# Patient Record
Sex: FEMALE | Race: WHITE | NOT HISPANIC OR LATINO | Employment: FULL TIME | URBAN - METROPOLITAN AREA
[De-identification: names, ages, dates, MRNs, and addresses within clinical notes are randomized per-mention and may not be internally consistent; named-entity substitution may affect disease eponyms.]

---

## 2017-01-09 ENCOUNTER — TRANSCRIBE ORDERS (OUTPATIENT)
Dept: ADMINISTRATIVE | Facility: HOSPITAL | Age: 32
End: 2017-01-09

## 2017-01-09 DIAGNOSIS — Z34.92 PRENATAL CARE, SECOND TRIMESTER: Primary | ICD-10-CM

## 2017-01-11 ENCOUNTER — APPOINTMENT (OUTPATIENT)
Dept: LAB | Facility: HOSPITAL | Age: 32
End: 2017-01-11
Payer: COMMERCIAL

## 2017-01-11 DIAGNOSIS — Z34.90 ENCOUNTER FOR SUPERVISION OF NORMAL PREGNANCY: ICD-10-CM

## 2017-01-11 PROCEDURE — 84702 CHORIONIC GONADOTROPIN TEST: CPT

## 2017-01-11 PROCEDURE — 82677 ASSAY OF ESTRIOL: CPT

## 2017-01-11 PROCEDURE — 36415 COLL VENOUS BLD VENIPUNCTURE: CPT

## 2017-01-11 PROCEDURE — 86336 INHIBIN A: CPT

## 2017-01-11 PROCEDURE — 82105 ALPHA-FETOPROTEIN SERUM: CPT

## 2017-01-14 LAB
2ND TRIMESTER 4 SCREEN SERPL-IMP: NORMAL
2ND TRIMESTER 4 SCREEN SERPL-IMP: NORMAL
AFP ADJ MOM SERPL: 1.07
AFP SERPL-MCNC: 37.6 NG/ML
AGE AT DELIVERY: 30.1 YEARS
FET TS 18 RISK FROM MAT AGE: NORMAL
FET TS 21 RISK FROM MAT AGE: 690
GA METHOD: NORMAL
GA: 17 WEEKS
HCG ADJ MOM SERPL: 3.1
HCG SERPL-ACNC: NORMAL MIU/ML
IDDM PATIENT QL: NORMAL
INHIBIN A ADJ MOM SERPL: 1.49
INHIBIN A SERPL-MCNC: 248.91 PG/ML
KARYOTYP BLD/T: NORMAL
Lab: NORMAL
MULTIPLE PREGNANCY: NO
NEURAL TUBE DEFECT RISK FETUS: 9862 %
SERVICE CMNT-IMP: NORMAL
TS 18 RISK FETUS: NORMAL
TS 21 RISK FETUS: 2084
U ESTRIOL ADJ MOM SERPL: 0.92
U ESTRIOL SERPL-MCNC: 1 NG/ML

## 2017-01-24 ENCOUNTER — GENERIC CONVERSION - ENCOUNTER (OUTPATIENT)
Dept: OTHER | Facility: OTHER | Age: 32
End: 2017-01-24

## 2017-01-31 ENCOUNTER — GENERIC CONVERSION - ENCOUNTER (OUTPATIENT)
Dept: OTHER | Facility: OTHER | Age: 32
End: 2017-01-31

## 2017-02-02 ENCOUNTER — GENERIC CONVERSION - ENCOUNTER (OUTPATIENT)
Dept: OTHER | Facility: OTHER | Age: 32
End: 2017-02-02

## 2017-02-02 ENCOUNTER — ALLSCRIPTS OFFICE VISIT (OUTPATIENT)
Dept: PERINATAL CARE | Facility: CLINIC | Age: 32
End: 2017-02-02
Payer: COMMERCIAL

## 2017-02-02 PROCEDURE — 76817 TRANSVAGINAL US OBSTETRIC: CPT | Performed by: OBSTETRICS & GYNECOLOGY

## 2017-02-02 PROCEDURE — 76805 OB US >/= 14 WKS SNGL FETUS: CPT | Performed by: OBSTETRICS & GYNECOLOGY

## 2017-02-20 ENCOUNTER — GENERIC CONVERSION - ENCOUNTER (OUTPATIENT)
Dept: OTHER | Facility: OTHER | Age: 32
End: 2017-02-20

## 2017-03-22 ENCOUNTER — GENERIC CONVERSION - ENCOUNTER (OUTPATIENT)
Dept: OTHER | Facility: OTHER | Age: 32
End: 2017-03-22

## 2017-03-22 DIAGNOSIS — Z33.1 PREGNANT STATE, INCIDENTAL: ICD-10-CM

## 2017-03-30 ENCOUNTER — APPOINTMENT (OUTPATIENT)
Dept: LAB | Facility: HOSPITAL | Age: 32
End: 2017-03-30
Payer: COMMERCIAL

## 2017-03-30 ENCOUNTER — TRANSCRIBE ORDERS (OUTPATIENT)
Dept: LAB | Facility: HOSPITAL | Age: 32
End: 2017-03-30

## 2017-03-30 DIAGNOSIS — Z33.1 PREGNANT STATE, INCIDENTAL: ICD-10-CM

## 2017-03-30 LAB
BASOPHILS # BLD AUTO: 0.02 THOUSANDS/ΜL (ref 0–0.1)
BASOPHILS NFR BLD AUTO: 0 % (ref 0–1)
EOSINOPHIL # BLD AUTO: 0.07 THOUSAND/ΜL (ref 0–0.61)
EOSINOPHIL NFR BLD AUTO: 1 % (ref 0–6)
ERYTHROCYTE [DISTWIDTH] IN BLOOD BY AUTOMATED COUNT: 13.3 % (ref 11.6–15.1)
GLUCOSE 1H P 50 G GLC PO SERPL-MCNC: 115 MG/DL
HCT VFR BLD AUTO: 33.4 % (ref 34.8–46.1)
HGB BLD-MCNC: 11 G/DL (ref 11.5–15.4)
LYMPHOCYTES # BLD AUTO: 1.33 THOUSANDS/ΜL (ref 0.6–4.47)
LYMPHOCYTES NFR BLD AUTO: 14 % (ref 14–44)
MCH RBC QN AUTO: 29.8 PG (ref 26.8–34.3)
MCHC RBC AUTO-ENTMCNC: 32.9 G/DL (ref 31.4–37.4)
MCV RBC AUTO: 91 FL (ref 82–98)
MONOCYTES # BLD AUTO: 0.61 THOUSAND/ΜL (ref 0.17–1.22)
MONOCYTES NFR BLD AUTO: 7 % (ref 4–12)
NEUTROPHILS # BLD AUTO: 7.37 THOUSANDS/ΜL (ref 1.85–7.62)
NEUTS SEG NFR BLD AUTO: 78 % (ref 43–75)
NRBC BLD AUTO-RTO: 0 /100 WBCS
PLATELET # BLD AUTO: 275 THOUSANDS/UL (ref 149–390)
PMV BLD AUTO: 10.1 FL (ref 8.9–12.7)
RBC # BLD AUTO: 3.69 MILLION/UL (ref 3.81–5.12)
WBC # BLD AUTO: 9.43 THOUSAND/UL (ref 4.31–10.16)

## 2017-03-30 PROCEDURE — 85025 COMPLETE CBC W/AUTO DIFF WBC: CPT

## 2017-03-30 PROCEDURE — 36415 COLL VENOUS BLD VENIPUNCTURE: CPT

## 2017-03-30 PROCEDURE — 82950 GLUCOSE TEST: CPT

## 2017-03-30 PROCEDURE — 86592 SYPHILIS TEST NON-TREP QUAL: CPT

## 2017-03-31 LAB — RPR SER QL: NORMAL

## 2017-04-05 ENCOUNTER — ALLSCRIPTS OFFICE VISIT (OUTPATIENT)
Dept: OTHER | Facility: OTHER | Age: 32
End: 2017-04-05

## 2017-04-19 ENCOUNTER — GENERIC CONVERSION - ENCOUNTER (OUTPATIENT)
Dept: OTHER | Facility: OTHER | Age: 32
End: 2017-04-19

## 2017-04-27 ENCOUNTER — ALLSCRIPTS OFFICE VISIT (OUTPATIENT)
Dept: PERINATAL CARE | Facility: CLINIC | Age: 32
End: 2017-04-27
Payer: COMMERCIAL

## 2017-04-27 ENCOUNTER — GENERIC CONVERSION - ENCOUNTER (OUTPATIENT)
Dept: OTHER | Facility: OTHER | Age: 32
End: 2017-04-27

## 2017-04-27 PROCEDURE — 76816 OB US FOLLOW-UP PER FETUS: CPT | Performed by: OBSTETRICS & GYNECOLOGY

## 2017-05-03 ENCOUNTER — GENERIC CONVERSION - ENCOUNTER (OUTPATIENT)
Dept: OTHER | Facility: OTHER | Age: 32
End: 2017-05-03

## 2017-05-15 ENCOUNTER — GENERIC CONVERSION - ENCOUNTER (OUTPATIENT)
Dept: OTHER | Facility: OTHER | Age: 32
End: 2017-05-15

## 2017-05-30 ENCOUNTER — GENERIC CONVERSION - ENCOUNTER (OUTPATIENT)
Dept: OTHER | Facility: OTHER | Age: 32
End: 2017-05-30

## 2017-05-30 ENCOUNTER — LAB REQUISITION (OUTPATIENT)
Dept: LAB | Facility: HOSPITAL | Age: 32
End: 2017-05-30
Payer: COMMERCIAL

## 2017-05-30 DIAGNOSIS — Z33.1 PREGNANT STATE, INCIDENTAL: ICD-10-CM

## 2017-05-30 PROCEDURE — 87653 STREP B DNA AMP PROBE: CPT | Performed by: PHYSICIAN ASSISTANT

## 2017-06-01 LAB — GP B STREP DNA SPEC QL NAA+PROBE: ABNORMAL

## 2017-06-05 ENCOUNTER — GENERIC CONVERSION - ENCOUNTER (OUTPATIENT)
Dept: OTHER | Facility: OTHER | Age: 32
End: 2017-06-05

## 2017-06-08 ENCOUNTER — ALLSCRIPTS OFFICE VISIT (OUTPATIENT)
Dept: PERINATAL CARE | Facility: CLINIC | Age: 32
End: 2017-06-08
Payer: COMMERCIAL

## 2017-06-08 ENCOUNTER — GENERIC CONVERSION - ENCOUNTER (OUTPATIENT)
Dept: OTHER | Facility: OTHER | Age: 32
End: 2017-06-08

## 2017-06-08 PROCEDURE — 76816 OB US FOLLOW-UP PER FETUS: CPT | Performed by: OBSTETRICS & GYNECOLOGY

## 2017-06-13 ENCOUNTER — GENERIC CONVERSION - ENCOUNTER (OUTPATIENT)
Dept: OTHER | Facility: OTHER | Age: 32
End: 2017-06-13

## 2017-06-16 ENCOUNTER — ANESTHESIA EVENT (INPATIENT)
Dept: LABOR AND DELIVERY | Facility: HOSPITAL | Age: 32
End: 2017-06-16
Payer: COMMERCIAL

## 2017-06-16 ENCOUNTER — ANESTHESIA (INPATIENT)
Dept: LABOR AND DELIVERY | Facility: HOSPITAL | Age: 32
End: 2017-06-16
Payer: COMMERCIAL

## 2017-06-16 ENCOUNTER — HOSPITAL ENCOUNTER (INPATIENT)
Facility: HOSPITAL | Age: 32
LOS: 1 days | Discharge: HOME | End: 2017-06-17
Attending: OBSTETRICS & GYNECOLOGY | Admitting: OBSTETRICS & GYNECOLOGY
Payer: COMMERCIAL

## 2017-06-16 DIAGNOSIS — B95.1 POSITIVE GBS TEST: ICD-10-CM

## 2017-06-16 DIAGNOSIS — O35.8XX0: ICD-10-CM

## 2017-06-16 DIAGNOSIS — O36.60X0 LARGE FOR GESTATIONAL AGE FETUS AFFECTING MANAGEMENT OF MOTHER: ICD-10-CM

## 2017-06-16 DIAGNOSIS — Z3A.38 38 WEEKS GESTATION OF PREGNANCY: Primary | ICD-10-CM

## 2017-06-16 DIAGNOSIS — Z98.891 PREVIOUS CESAREAN SECTION: ICD-10-CM

## 2017-06-16 PROBLEM — O35.HXX0 CLUB FOOT, FETAL, AFFECTING CARE OF MOTHER, ANTEPARTUM: Status: ACTIVE | Noted: 2017-06-16

## 2017-06-16 PROBLEM — Z3A.39 39 WEEKS GESTATION OF PREGNANCY: Status: ACTIVE | Noted: 2017-06-16

## 2017-06-16 LAB
ABO GROUP BLD: NORMAL
BASE EXCESS BLDCOA CALC-SCNC: -3.4 MMOL/L (ref 3–11)
BASE EXCESS BLDCOV CALC-SCNC: -3.1 MMOL/L (ref 1–9)
BASOPHILS # BLD AUTO: 0.02 THOUSANDS/ΜL (ref 0–0.1)
BASOPHILS NFR BLD AUTO: 0 % (ref 0–1)
BLD GP AB SCN SERPL QL: NEGATIVE
EOSINOPHIL # BLD AUTO: 0.04 THOUSAND/ΜL (ref 0–0.61)
EOSINOPHIL NFR BLD AUTO: 0 % (ref 0–6)
ERYTHROCYTE [DISTWIDTH] IN BLOOD BY AUTOMATED COUNT: 14.3 % (ref 11.6–15.1)
HCO3 BLDCOA-SCNC: 24.2 MMOL/L (ref 17.3–27.3)
HCO3 BLDCOV-SCNC: 22.4 MMOL/L (ref 12.2–28.6)
HCT VFR BLD AUTO: 35.8 % (ref 34.8–46.1)
HGB BLD-MCNC: 12 G/DL (ref 11.5–15.4)
LYMPHOCYTES # BLD AUTO: 2.04 THOUSANDS/ΜL (ref 0.6–4.47)
LYMPHOCYTES NFR BLD AUTO: 19 % (ref 14–44)
MCH RBC QN AUTO: 29.2 PG (ref 26.8–34.3)
MCHC RBC AUTO-ENTMCNC: 33.5 G/DL (ref 31.4–37.4)
MCV RBC AUTO: 87 FL (ref 82–98)
MONOCYTES # BLD AUTO: 0.63 THOUSAND/ΜL (ref 0.17–1.22)
MONOCYTES NFR BLD AUTO: 6 % (ref 4–12)
NEUTROPHILS # BLD AUTO: 8.11 THOUSANDS/ΜL (ref 1.85–7.62)
NEUTS SEG NFR BLD AUTO: 75 % (ref 43–75)
NRBC BLD AUTO-RTO: 0 /100 WBCS
O2 CT VFR BLDCOA CALC: 2.6 ML/DL
OXYHGB MFR BLDCOA: 13.6 %
OXYHGB MFR BLDCOV: 51.7 %
PCO2 BLDCOA: 54.4 MM[HG] (ref 30–60)
PCO2 BLDCOV: 41.9 MM HG (ref 27–43)
PH BLDCOA: 7.27 [PH] (ref 7.23–7.43)
PH BLDCOV: 7.35 [PH] (ref 7.19–7.49)
PLATELET # BLD AUTO: 229 THOUSANDS/UL (ref 149–390)
PMV BLD AUTO: 11.6 FL (ref 8.9–12.7)
PO2 BLDCOA: 10.9 MM HG (ref 5–25)
PO2 BLDCOV: 21.3 MM HG (ref 15–45)
RBC # BLD AUTO: 4.11 MILLION/UL (ref 3.81–5.12)
RH BLD: POSITIVE
RPR SER QL: NORMAL
SAO2 % BLDCOV: 10.5 ML/DL
SPECIMEN EXPIRATION DATE: NORMAL
WBC # BLD AUTO: 10.87 THOUSAND/UL (ref 4.31–10.16)

## 2017-06-16 PROCEDURE — 82805 BLOOD GASES W/O2 SATURATION: CPT | Performed by: OBSTETRICS & GYNECOLOGY

## 2017-06-16 PROCEDURE — 86901 BLOOD TYPING SEROLOGIC RH(D): CPT | Performed by: OBSTETRICS & GYNECOLOGY

## 2017-06-16 PROCEDURE — 85025 COMPLETE CBC W/AUTO DIFF WBC: CPT | Performed by: OBSTETRICS & GYNECOLOGY

## 2017-06-16 PROCEDURE — 86592 SYPHILIS TEST NON-TREP QUAL: CPT | Performed by: OBSTETRICS & GYNECOLOGY

## 2017-06-16 PROCEDURE — 86850 RBC ANTIBODY SCREEN: CPT | Performed by: OBSTETRICS & GYNECOLOGY

## 2017-06-16 PROCEDURE — 99214 OFFICE O/P EST MOD 30 MIN: CPT

## 2017-06-16 PROCEDURE — 4A1HXCZ MONITORING OF PRODUCTS OF CONCEPTION, CARDIAC RATE, EXTERNAL APPROACH: ICD-10-PCS | Performed by: OBSTETRICS & GYNECOLOGY

## 2017-06-16 PROCEDURE — 94762 N-INVAS EAR/PLS OXIMTRY CONT: CPT

## 2017-06-16 PROCEDURE — 86900 BLOOD TYPING SEROLOGIC ABO: CPT | Performed by: OBSTETRICS & GYNECOLOGY

## 2017-06-16 RX ORDER — DIPHENHYDRAMINE HCL 25 MG
25 TABLET ORAL EVERY 6 HOURS PRN
Status: DISCONTINUED | OUTPATIENT
Start: 2017-06-16 | End: 2017-06-18 | Stop reason: HOSPADM

## 2017-06-16 RX ORDER — KETOROLAC TROMETHAMINE 30 MG/ML
INJECTION, SOLUTION INTRAMUSCULAR; INTRAVENOUS
Status: COMPLETED
Start: 2017-06-16 | End: 2017-06-16

## 2017-06-16 RX ORDER — OXYTOCIN/RINGER'S LACTATE 30/500 ML
62.5 PLASTIC BAG, INJECTION (ML) INTRAVENOUS ONCE
Status: COMPLETED | OUTPATIENT
Start: 2017-06-16 | End: 2017-06-17

## 2017-06-16 RX ORDER — IRON POLYSACCHARIDE COMPLEX 150 MG
150 CAPSULE ORAL 2 TIMES DAILY
COMMUNITY
End: 2019-12-13 | Stop reason: ALTCHOICE

## 2017-06-16 RX ORDER — OXYCODONE HYDROCHLORIDE AND ACETAMINOPHEN 5; 325 MG/1; MG/1
1 TABLET ORAL EVERY 4 HOURS PRN
Status: DISCONTINUED | OUTPATIENT
Start: 2017-06-16 | End: 2017-06-18 | Stop reason: HOSPADM

## 2017-06-16 RX ORDER — OXYCODONE HYDROCHLORIDE AND ACETAMINOPHEN 5; 325 MG/1; MG/1
2 TABLET ORAL EVERY 4 HOURS PRN
Status: DISCONTINUED | OUTPATIENT
Start: 2017-06-16 | End: 2017-06-18 | Stop reason: HOSPADM

## 2017-06-16 RX ORDER — METOCLOPRAMIDE HYDROCHLORIDE 5 MG/ML
5 INJECTION INTRAMUSCULAR; INTRAVENOUS EVERY 6 HOURS PRN
Status: ACTIVE | OUTPATIENT
Start: 2017-06-16 | End: 2017-06-17

## 2017-06-16 RX ORDER — ONDANSETRON 2 MG/ML
4 INJECTION INTRAMUSCULAR; INTRAVENOUS EVERY 8 HOURS PRN
Status: DISCONTINUED | OUTPATIENT
Start: 2017-06-16 | End: 2017-06-18 | Stop reason: HOSPADM

## 2017-06-16 RX ORDER — EPHEDRINE SULFATE 50 MG/ML
INJECTION, SOLUTION INTRAVENOUS AS NEEDED
Status: DISCONTINUED | OUTPATIENT
Start: 2017-06-16 | End: 2017-06-16 | Stop reason: SURG

## 2017-06-16 RX ORDER — NALBUPHINE HCL 10 MG/ML
5 AMPUL (ML) INJECTION
Status: ACTIVE | OUTPATIENT
Start: 2017-06-16 | End: 2017-06-17

## 2017-06-16 RX ORDER — DOCUSATE SODIUM 100 MG/1
100 CAPSULE, LIQUID FILLED ORAL 2 TIMES DAILY
Status: DISCONTINUED | OUTPATIENT
Start: 2017-06-16 | End: 2017-06-18 | Stop reason: HOSPADM

## 2017-06-16 RX ORDER — SODIUM CHLORIDE, SODIUM LACTATE, POTASSIUM CHLORIDE, CALCIUM CHLORIDE 600; 310; 30; 20 MG/100ML; MG/100ML; MG/100ML; MG/100ML
125 INJECTION, SOLUTION INTRAVENOUS CONTINUOUS
Status: DISCONTINUED | OUTPATIENT
Start: 2017-06-16 | End: 2017-06-16

## 2017-06-16 RX ORDER — ONDANSETRON 2 MG/ML
INJECTION INTRAMUSCULAR; INTRAVENOUS AS NEEDED
Status: DISCONTINUED | OUTPATIENT
Start: 2017-06-16 | End: 2017-06-16 | Stop reason: SURG

## 2017-06-16 RX ORDER — ONDANSETRON 2 MG/ML
4 INJECTION INTRAMUSCULAR; INTRAVENOUS EVERY 4 HOURS PRN
Status: ACTIVE | OUTPATIENT
Start: 2017-06-16 | End: 2017-06-17

## 2017-06-16 RX ORDER — ONDANSETRON 2 MG/ML
4 INJECTION INTRAMUSCULAR; INTRAVENOUS ONCE AS NEEDED
Status: DISCONTINUED | OUTPATIENT
Start: 2017-06-16 | End: 2017-06-18 | Stop reason: HOSPADM

## 2017-06-16 RX ORDER — ACETAMINOPHEN 325 MG/1
650 TABLET ORAL EVERY 6 HOURS PRN
Status: DISCONTINUED | OUTPATIENT
Start: 2017-06-16 | End: 2017-06-18 | Stop reason: HOSPADM

## 2017-06-16 RX ORDER — FENTANYL CITRATE/PF 50 MCG/ML
25 SYRINGE (ML) INJECTION
Status: DISCONTINUED | OUTPATIENT
Start: 2017-06-16 | End: 2017-06-18 | Stop reason: HOSPADM

## 2017-06-16 RX ORDER — DIAPER,BRIEF,INFANT-TODD,DISP
1 EACH MISCELLANEOUS DAILY PRN
Status: DISCONTINUED | OUTPATIENT
Start: 2017-06-16 | End: 2017-06-18 | Stop reason: HOSPADM

## 2017-06-16 RX ORDER — BUPIVACAINE HYDROCHLORIDE 7.5 MG/ML
INJECTION, SOLUTION EPIDURAL; RETROBULBAR AS NEEDED
Status: DISCONTINUED | OUTPATIENT
Start: 2017-06-16 | End: 2017-06-16 | Stop reason: SURG

## 2017-06-16 RX ORDER — MORPHINE SULFATE 1 MG/ML
INJECTION, SOLUTION EPIDURAL; INTRATHECAL; INTRAVENOUS AS NEEDED
Status: DISCONTINUED | OUTPATIENT
Start: 2017-06-16 | End: 2017-06-16 | Stop reason: SURG

## 2017-06-16 RX ORDER — METOCLOPRAMIDE HYDROCHLORIDE 5 MG/ML
10 INJECTION INTRAMUSCULAR; INTRAVENOUS ONCE AS NEEDED
Status: DISCONTINUED | OUTPATIENT
Start: 2017-06-16 | End: 2017-06-18 | Stop reason: HOSPADM

## 2017-06-16 RX ORDER — FENTANYL CITRATE 50 UG/ML
INJECTION, SOLUTION INTRAMUSCULAR; INTRAVENOUS AS NEEDED
Status: DISCONTINUED | OUTPATIENT
Start: 2017-06-16 | End: 2017-06-16 | Stop reason: SURG

## 2017-06-16 RX ORDER — MIDAZOLAM HYDROCHLORIDE 1 MG/ML
INJECTION INTRAMUSCULAR; INTRAVENOUS AS NEEDED
Status: DISCONTINUED | OUTPATIENT
Start: 2017-06-16 | End: 2017-06-16 | Stop reason: SURG

## 2017-06-16 RX ORDER — ACETAMINOPHEN 325 MG/1
TABLET ORAL
Status: COMPLETED
Start: 2017-06-16 | End: 2017-06-16

## 2017-06-16 RX ORDER — IBUPROFEN 600 MG/1
600 TABLET ORAL EVERY 6 HOURS PRN
Status: DISCONTINUED | OUTPATIENT
Start: 2017-06-16 | End: 2017-06-18 | Stop reason: HOSPADM

## 2017-06-16 RX ORDER — OXYTOCIN/RINGER'S LACTATE 30/500 ML
PLASTIC BAG, INJECTION (ML) INTRAVENOUS
Status: DISPENSED
Start: 2017-06-16 | End: 2017-06-16

## 2017-06-16 RX ORDER — KETOROLAC TROMETHAMINE 30 MG/ML
30 INJECTION, SOLUTION INTRAMUSCULAR; INTRAVENOUS EVERY 6 HOURS
Status: DISPENSED | OUTPATIENT
Start: 2017-06-16 | End: 2017-06-17

## 2017-06-16 RX ORDER — OXYTOCIN/RINGER'S LACTATE 30/500 ML
PLASTIC BAG, INJECTION (ML) INTRAVENOUS
Status: COMPLETED
Start: 2017-06-16 | End: 2017-06-16

## 2017-06-16 RX ORDER — DIPHENHYDRAMINE HYDROCHLORIDE 50 MG/ML
25 INJECTION INTRAMUSCULAR; INTRAVENOUS EVERY 6 HOURS PRN
Status: ACTIVE | OUTPATIENT
Start: 2017-06-16 | End: 2017-06-17

## 2017-06-16 RX ORDER — ACETAMINOPHEN 325 MG/1
650 TABLET ORAL EVERY 6 HOURS
Status: DISCONTINUED | OUTPATIENT
Start: 2017-06-16 | End: 2017-06-18 | Stop reason: HOSPADM

## 2017-06-16 RX ORDER — OXYTOCIN/RINGER'S LACTATE 30/500 ML
62.5 PLASTIC BAG, INJECTION (ML) INTRAVENOUS CONTINUOUS
Status: DISPENSED | OUTPATIENT
Start: 2017-06-16 | End: 2017-06-17

## 2017-06-16 RX ORDER — SODIUM CHLORIDE, SODIUM LACTATE, POTASSIUM CHLORIDE, CALCIUM CHLORIDE 600; 310; 30; 20 MG/100ML; MG/100ML; MG/100ML; MG/100ML
125 INJECTION, SOLUTION INTRAVENOUS CONTINUOUS
Status: DISCONTINUED | OUTPATIENT
Start: 2017-06-16 | End: 2017-06-18 | Stop reason: HOSPADM

## 2017-06-16 RX ORDER — NALOXONE HYDROCHLORIDE 0.4 MG/ML
0.1 INJECTION, SOLUTION INTRAMUSCULAR; INTRAVENOUS; SUBCUTANEOUS
Status: ACTIVE | OUTPATIENT
Start: 2017-06-16 | End: 2017-06-17

## 2017-06-16 RX ADMIN — CEFAZOLIN SODIUM 2000 MG: 2 SOLUTION INTRAVENOUS at 03:15

## 2017-06-16 RX ADMIN — SODIUM CHLORIDE, SODIUM LACTATE, POTASSIUM CHLORIDE, AND CALCIUM CHLORIDE 125 ML/HR: .6; .31; .03; .02 INJECTION, SOLUTION INTRAVENOUS at 14:37

## 2017-06-16 RX ADMIN — Medication 62.5 MILLI-UNITS/MIN: at 13:46

## 2017-06-16 RX ADMIN — KETOROLAC TROMETHAMINE 30 MG: 30 INJECTION, SOLUTION INTRAMUSCULAR at 18:03

## 2017-06-16 RX ADMIN — ONDANSETRON 4 MG: 2 INJECTION INTRAMUSCULAR; INTRAVENOUS at 04:01

## 2017-06-16 RX ADMIN — MORPHINE SULFATE 0.2 MG: 1 INJECTION, SOLUTION EPIDURAL; INTRATHECAL; INTRAVENOUS at 03:11

## 2017-06-16 RX ADMIN — AZITHROMYCIN FOR INJECTION INJECTION, POWDER, LYOPHILIZED, FOR SOLUTION 500 MG: 500 INJECTION INTRAVENOUS at 04:15

## 2017-06-16 RX ADMIN — KETOROLAC TROMETHAMINE 30 MG: 30 INJECTION, SOLUTION INTRAMUSCULAR at 23:57

## 2017-06-16 RX ADMIN — KETOROLAC TROMETHAMINE 30 MG: 30 INJECTION, SOLUTION INTRAMUSCULAR at 05:59

## 2017-06-16 RX ADMIN — BUPIVACAINE HYDROCHLORIDE 1.6 ML: 7.5 INJECTION, SOLUTION EPIDURAL; RETROBULBAR at 03:11

## 2017-06-16 RX ADMIN — FENTANYL CITRATE 25 MCG: 50 INJECTION, SOLUTION INTRAMUSCULAR; INTRAVENOUS at 04:01

## 2017-06-16 RX ADMIN — MIDAZOLAM HYDROCHLORIDE 2 MG: 1 INJECTION, SOLUTION INTRAMUSCULAR; INTRAVENOUS at 04:01

## 2017-06-16 RX ADMIN — Medication 62.5 MILLI-UNITS/MIN: at 05:40

## 2017-06-16 RX ADMIN — DOCUSATE SODIUM 100 MG: 100 CAPSULE, LIQUID FILLED ORAL at 17:03

## 2017-06-16 RX ADMIN — SODIUM CHLORIDE, SODIUM LACTATE, POTASSIUM CHLORIDE, AND CALCIUM CHLORIDE: .6; .31; .03; .02 INJECTION, SOLUTION INTRAVENOUS at 02:55

## 2017-06-16 RX ADMIN — ACETAMINOPHEN 650 MG: 325 TABLET, FILM COATED ORAL at 05:59

## 2017-06-16 RX ADMIN — EPHEDRINE SULFATE 5 MG: 50 INJECTION, SOLUTION INTRAMUSCULAR; INTRAVENOUS; SUBCUTANEOUS at 03:17

## 2017-06-16 RX ADMIN — OXYTOCIN 250 MILLI-UNITS/MIN: 10 INJECTION, SOLUTION INTRAMUSCULAR; INTRAVENOUS at 03:39

## 2017-06-16 RX ADMIN — SODIUM CHLORIDE, SODIUM LACTATE, POTASSIUM CHLORIDE, AND CALCIUM CHLORIDE 125 ML/HR: .6; .31; .03; .02 INJECTION, SOLUTION INTRAVENOUS at 21:37

## 2017-06-16 RX ADMIN — Medication 62.5 MILLI-UNITS/MIN: at 21:35

## 2017-06-16 RX ADMIN — DOCUSATE SODIUM 100 MG: 100 CAPSULE, LIQUID FILLED ORAL at 08:58

## 2017-06-16 RX ADMIN — SODIUM CHLORIDE, SODIUM LACTATE, POTASSIUM CHLORIDE, AND CALCIUM CHLORIDE 125 ML/HR: .6; .31; .03; .02 INJECTION, SOLUTION INTRAVENOUS at 05:44

## 2017-06-16 RX ADMIN — SODIUM CHLORIDE, SODIUM LACTATE, POTASSIUM CHLORIDE, AND CALCIUM CHLORIDE: .6; .31; .03; .02 INJECTION, SOLUTION INTRAVENOUS at 03:45

## 2017-06-17 VITALS
TEMPERATURE: 98 F | OXYGEN SATURATION: 98 % | DIASTOLIC BLOOD PRESSURE: 67 MMHG | RESPIRATION RATE: 18 BRPM | HEIGHT: 63 IN | WEIGHT: 186 LBS | SYSTOLIC BLOOD PRESSURE: 138 MMHG | BODY MASS INDEX: 32.96 KG/M2 | HEART RATE: 81 BPM

## 2017-06-17 LAB
BASOPHILS # BLD AUTO: 0.02 THOUSANDS/ΜL (ref 0–0.1)
BASOPHILS NFR BLD AUTO: 0 % (ref 0–1)
EOSINOPHIL # BLD AUTO: 0.07 THOUSAND/ΜL (ref 0–0.61)
EOSINOPHIL NFR BLD AUTO: 1 % (ref 0–6)
ERYTHROCYTE [DISTWIDTH] IN BLOOD BY AUTOMATED COUNT: 14.4 % (ref 11.6–15.1)
HCT VFR BLD AUTO: 28.1 % (ref 34.8–46.1)
HGB BLD-MCNC: 9.5 G/DL (ref 11.5–15.4)
LYMPHOCYTES # BLD AUTO: 2.09 THOUSANDS/ΜL (ref 0.6–4.47)
LYMPHOCYTES NFR BLD AUTO: 17 % (ref 14–44)
MCH RBC QN AUTO: 29.4 PG (ref 26.8–34.3)
MCHC RBC AUTO-ENTMCNC: 33.8 G/DL (ref 31.4–37.4)
MCV RBC AUTO: 87 FL (ref 82–98)
MONOCYTES # BLD AUTO: 0.84 THOUSAND/ΜL (ref 0.17–1.22)
MONOCYTES NFR BLD AUTO: 7 % (ref 4–12)
NEUTROPHILS # BLD AUTO: 9.49 THOUSANDS/ΜL (ref 1.85–7.62)
NEUTS SEG NFR BLD AUTO: 75 % (ref 43–75)
NRBC BLD AUTO-RTO: 0 /100 WBCS
PLATELET # BLD AUTO: 199 THOUSANDS/UL (ref 149–390)
PMV BLD AUTO: 11.2 FL (ref 8.9–12.7)
RBC # BLD AUTO: 3.23 MILLION/UL (ref 3.81–5.12)
WBC # BLD AUTO: 12.55 THOUSAND/UL (ref 4.31–10.16)

## 2017-06-17 PROCEDURE — 85025 COMPLETE CBC W/AUTO DIFF WBC: CPT | Performed by: OBSTETRICS & GYNECOLOGY

## 2017-06-17 RX ORDER — OXYCODONE HYDROCHLORIDE AND ACETAMINOPHEN 5; 325 MG/1; MG/1
1 TABLET ORAL EVERY 4 HOURS PRN
Qty: 30 TABLET | Refills: 0 | Status: SHIPPED | OUTPATIENT
Start: 2017-06-17 | End: 2017-06-27

## 2017-06-17 RX ORDER — IBUPROFEN 600 MG/1
600 TABLET ORAL EVERY 6 HOURS PRN
Qty: 30 TABLET | Refills: 0 | Status: SHIPPED | OUTPATIENT
Start: 2017-06-17 | End: 2018-05-24 | Stop reason: ALTCHOICE

## 2017-06-17 RX ADMIN — ACETAMINOPHEN 650 MG: 325 TABLET, FILM COATED ORAL at 06:06

## 2017-06-17 RX ADMIN — ACETAMINOPHEN 650 MG: 325 TABLET, FILM COATED ORAL at 13:08

## 2017-06-17 RX ADMIN — DOCUSATE SODIUM 100 MG: 100 CAPSULE, LIQUID FILLED ORAL at 08:10

## 2017-06-17 RX ADMIN — Medication 1 TABLET: at 08:10

## 2017-06-26 LAB — PLACENTA IN STORAGE: NORMAL

## 2017-08-10 ENCOUNTER — ALLSCRIPTS OFFICE VISIT (OUTPATIENT)
Dept: OTHER | Facility: OTHER | Age: 32
End: 2017-08-10

## 2018-01-11 NOTE — MISCELLANEOUS
Message   Recorded as Task   Date: 05/06/2016 12:49 PM, Created By: Rachid Cano   Task Name: Medical Complaint Callback   Assigned To: Maranda Belle   Regarding Patient: Calin Aly, Status: In Progress   Comment:    Laverne Ramirez - 06 May 2016 12:49 PM     TASK CREATED  Caller: Self; [phone] (Home)  pt has been spotting for 2weeks   mirena inserted 7/2015 pt is concerned please advise pt @ [facility] - 06 May 2016 12:56 PM     TASK IN [address] [place] [address],[address] - 06 May 2016 1:06 PM     TASK EDITED  Pt states she has had mirena since 2015  spotting bright red for the past two weeks daily  using a panty liner  denies pain,discharge, odor, itching  advised to do a pregnancy test, explained that breakthrough spotting is common with IUD  Has an apt the end of this month  Pt will call back with HPT  Isaias Traore - 06 May 2016 2:48 PM     TASK EDITED  This is just an FYI    Pt doesnt have time to do the HPT at this time  wants to know if she can get her yearly moved up to closer date    has apt on 6/10  Thanks   Isaias Traore - 06 May 2016 2:49 PM     TASK REASSIGNED: Previously Assigned To Ministerio Teresa - 07 May 2016 7:49 AM     TASK REPLIED TO: Previously Assigned To Zach Deepika Khan and left a message that irregular bleeding is expected  Recommended urine pregnancy test  No need to move up appt  Active Problems    1  Contraception (V25 9) (Z30 9)   2  Edema (782 3) (R60 9)   3  Encounter for IUD insertion (V25 11) (Z30 430)   4  Lactational amenorrhea (626 0) (N91 2)   5  Need for Tdap vaccination (V06 1) (Z23)   6  Pregnancy (V22 2) (Z33 1)    Current Meds   1  Prenatal Vitamin TABS; TAKE 1 TABLET DAILY; Therapy: (Recorded:07Nov2014) to Recorded    Allergies    1  No Known Drug Allergies    2  No Known Environmental Allergies   3  No Known Food Allergies    Signatures   Electronically signed by :  Willy Sousa, ; May  9 2016  7:55AM EST                       (Author)

## 2018-01-14 VITALS — SYSTOLIC BLOOD PRESSURE: 114 MMHG | DIASTOLIC BLOOD PRESSURE: 72 MMHG

## 2018-01-14 VITALS
HEIGHT: 64 IN | WEIGHT: 188 LBS | DIASTOLIC BLOOD PRESSURE: 73 MMHG | SYSTOLIC BLOOD PRESSURE: 129 MMHG | BODY MASS INDEX: 32.1 KG/M2

## 2018-01-14 VITALS
HEIGHT: 64 IN | DIASTOLIC BLOOD PRESSURE: 59 MMHG | WEIGHT: 182 LBS | SYSTOLIC BLOOD PRESSURE: 112 MMHG | BODY MASS INDEX: 31.07 KG/M2

## 2018-01-15 VITALS
WEIGHT: 162.4 LBS | DIASTOLIC BLOOD PRESSURE: 71 MMHG | HEIGHT: 64 IN | SYSTOLIC BLOOD PRESSURE: 119 MMHG | BODY MASS INDEX: 27.72 KG/M2

## 2018-01-15 NOTE — PROGRESS NOTES
2017         RE: Felisa Reed                               To: [facility]  MR#: [id number]                                    [address]   : [address]   ENC: [id number]                             [address]   (Exam #: [id number])                           Fax: [phone]      The LMP of this 29year old,  G2, P1-0-0-1 patient was SEP 17 2016, giving   her an MARTINEZ of 2017 and a current gestational age of 34 weeks 5 days   by dates  A sonographic examination was performed on 2017 using   real time equipment  The ultrasound examination was performed using   abdominal technique  The patient has a BMI of 31 2  Her blood pressure   today was 112/59  Earliest ultrasound found in her record: 16   11w1d  17 MARTINEZ      Darius Peters has no complaints today  She reports regular fetal movement and   denies problems related to vaginal bleeding,  labor, or   hypertension  Screening for gestational diabetes on  revealed a   normal one-hour post Glucola value of 115 mg/dL  She is interested in   pursuing TOLAC as an option        Cardiac motion was observed at 133 bpm       INDICATIONS      Interval growth assesment   missed anatomy follow up   elevated [facility]      Exam Types      Level I      RESULTS      Fetus # 1 of 1   Vertex presentation   Fetal growth appeared normal   Placenta Location = Anterior   No placenta previa   Placenta Grade = I      MEASUREMENTS (* Included In Average GA)      AC              28 9 cm        32 weeks 6 days* (68%)   BPD              8 5 cm        34 weeks 1 day * (86%)   HC              30 6 cm        33 weeks 5 days* (67%)   Femur            6 2 cm        32 weeks 1 day * (56%)      HC/AC           1 06   FL/AC           0 22   FL/BPD          0 73   EFW (Ac/Fl/Hc)  2066 grams - 4 lbs 9 oz                 (61%)      THE AVERAGE GESTATIONAL AGE is 33 weeks 1 day +/- 18 days  AMNIOTIC FLUID      Q1: 2 7      Q2: 1 0      Q3: 2 5      Q4: 2 7   LILIANE Total = 8 9 cm   Amniotic Fluid: Normal      ANATOMY DETAILS      Visualized Appearing Sonographically Normal:   HEAD: (Calvarium, BPD Level, Cavum, Lateral Ventricles);    FACE/NECK:   (Profile);    TH  CAV : (Diaphragm); HEART: (Four Chamber View,   Proximal Left Outflow, Proximal Right Outflow, Short Axis of Greater   Vessels, Interventricular Septum, Interatrial Septum, Cardiac Axis,   Cardiac Position);    STOMACH, RIGHT KIDNEY, LEFT KIDNEY, BLADDER,   EXTREMS: (Lt Low Leg, Lt Foot); PLACENTA, PCI      Not Visualized:   HEAD: (Cerebellum);    EXTREMS: (Lt Hand, Rt Low Leg)      Abnormal:   EXTREMS: (Rt Foot)      ANATOMY COMMENTS      The prior fetal anatomic survey was limited with respect to evaluation of   the feet, ankles, left hand, profile, and cardiac septal view  Right   talipes may be present  Views of the left foot, profile, left ankle,   and the cardiac septum were seen today as sonographically normal within   the inherent limitations of fetal ultrasound; however, views of the left   hand were still limited by the constraints related to unfavorable fetal   position  IMPRESSION      Moran IUP   33 weeks and 1 day by this ultrasound  (MARTINEZ=JUN 14 2017)   Vertex presentation   Fetal growth appeared normal   Regular fetal heart rate of 133 bpm   Talipes   Anterior placenta   No placenta previa      GENERAL COMMENT      Fetal right talipes may be present, though this is difficult to diagnose   with certainty given the constraints related to unfavorable fetal   position  No fetal structural abnormality is otherwise identified on the   Level I survey today  Fetal interval growth and amniotic fluid volume are   normal       Today's ultrasound findings and suggested follow-up were discussed in   detail with Macey Ortega   We discussed the need for pediatric orthopedic   surgery consultation if talipes is confirmed at birth  We also discuss,   however, that the difficulty in evaluating the distal lower extremities   today increases the likelihood for false positive diagnosis of talipes  She will return to the [address] [place] [address] on June 8 to assess fetal interval   growth and evaluate the right foot  Daily third trimester fetal kick   counting was discussed at the visit today  The face to face time, in addition to time spent discussing ultrasound   results, was approximately 10 minutes, greater than 50% of which was spent   during counseling and coordination of care  HARVINDER Enciso S , R MADAY HANSEN S  MEGAN Charles     Maternal-Fetal Medicine   Electronically signed 04/29/17 11:20

## 2018-01-16 NOTE — PROGRESS NOTES
2017         RE: Diogenes Pathak                               To: [facility]  MR#: [id number]                                    [address]   : [address]   ENC: [id number], [address]   (Exam #: [id number])                           Fax: [phone]      The LMP of this 36year old,  G2, P1-0-0-1 patient was SEP 17 2016, giving   her an MARTINEZ of 2017 and a current gestational age of 24 weeks 5 days   by dates  A sonographic examination was performed on 2017 using real   time equipment  The ultrasound examination was performed using abdominal &   vaginal techniques  The patient has a BMI of 27 8  Her blood pressure   today was 119/71  Earliest ultrasound found in her record: 16   11w1d  17 MARTINEZ      Thank you very much for your kind referral of Diogenes Pathak to the   Counts include 234 beds at the [facility]  in [place] on 2017 for level II ultrasound   evaluation and MFM assessment  Cheryl Lane is a 82-year-old  2 para   26 white female who is currently at 19-5/7 weeks gestation by an   estimated due date of 2017  Her prenatal course so far has been   unremarkable  Cheryl Lane has no complaints  She reports fetal movement and   denies vaginal bleeding  A recent Quad Screen revealed the Down syndrome   risk of one in  and an open neural tube defect risk of one in 9862  Of   note, the MShCG value was elevated at 3 10 MoM  Cheryl Lane has a history of a prior  section at term in    following an apparently uncomplicated prenatal course  She delivered a 9   lbs  14 oz  baby boy, currently healthy  Her past medical history is   unremarkable  Her past surgical history is otherwise significant for an   appendectomy   She takes no medication with the exception of a prenatal   vitamin and calcium supplementation on a daily basis and has no known drug allergy  She denies tobacco, alcohol, or illicit drug use during the   pregnancy  Her dad has hypertension and diabetes  There is no other first   degree family member with a diagnosis of diabetes, hypertension, venous   thromboembolism, or thyroid disease  The family genetic history is   negative with respect to genetic abnormalities, birth defects, or mental   retardation  Cardiac motion was observed at 159 bpm       INDICATIONS      fetal anatomical survey      Exam Types      Transvaginal   LEVEL II      RESULTS      Fetus # 1 of 1   Variable presentation   Fetal growth appeared normal   Placenta Location = Anterior   Placenta Grade = I      MEASUREMENTS (* Included In Average GA)      AC              15 5 cm        20 weeks 3 days* (65%)   BPD              4 7 cm        20 weeks 1 day * (65%)   HC              18 2 cm        20 weeks 4 days* (71%)   Femur            3 4 cm        21 weeks 0 days* (67%)      Nuchal Fold      5 0 mm      Humerus          3 1 cm        20 weeks 3 days  (66%)   Foot             3 0 cm        19 weeks 1 day      Cerebellum       2 1 cm        20 weeks 6 days   Biorbit          3 4 cm        21 weeks 4 days   CisternaMagna    5 2 mm      HC/AC           1 18   FL/AC           0 22   FL/BPD          0 73   EFW (Ac/Fl/Hc)   370 grams - 0 lbs 13 oz      THE AVERAGE GESTATIONAL AGE is 20 weeks 4 days +/- 10 days  AMNIOTIC FLUID         Largest Vertical Pocket = 4 4 cm   Amniotic Fluid: Normal      CERVICAL EVALUATION      The cervix appeared normal (Ultrasound Examination)  SUPINE      Cervical Length: 4 60 cm      OTHER TEST RESULTS           Funneling?: No             Dynamic Changes?: No        Resp  To TFP?: No                      Debris?: No      ANATOMY      Head                                    Normal   Face/Neck                               See Details   Th  Cav  Normal   Heart                                   See Details   Abd  Cav                                Normal   Stomach                                 Normal   Right Kidney                            Normal   Left Kidney                             Normal   Bladder                                 Normal   Abd  Wall                               Normal   Spine                                   Normal   Extrems                                 See Details   Genitalia                               Normal   Placenta                                Normal   Umbl  Cord                              Normal   Uterus                                  Normal   PCI                                     Normal      ANATOMY DETAILS      Visualized Appearing Sonographically Normal:   HEAD: (Calvarium, BPD Level, Cavum, Lateral Ventricles, Choroid Plexus,   Cerebellum, Cisterna Magna);    FACE/NECK: (Neck, Nuchal Fold, Orbits,   Nose/Lips, Palate, Face);    TH  CAV  : (Lungs, Diaphragm); HEART: (Four   Chamber View, Proximal Left Outflow, Proximal Right Outflow, 3VV, 3 Vessel   Trachea, Short Axis of Greater Vessels, Ductal Arch, Aortic Arch, IVC,   SVC, Cardiac Axis, Cardiac Position);    ABD  CAV : (Liver);    STOMACH,   RIGHT KIDNEY, LEFT KIDNEY, BLADDER, ABD  WALL, SPINE: (Cervical Spine,   Thoracic Spine, Lumbar Spine, Sacrum);    EXTREMS: (Lt Humerus, Rt   Humerus, Lt Forearm, Rt Forearm, Rt Hand, Lt Femur, Rt Femur);      GENITALIA (Male), PLACENTA, UMBL  CORD, UTERUS, PCI      Not Visualized:   FACE/NECK: (Profile); HEART: (Interventricular Septum, Interatrial   Septum);    EXTREMS: (Lt Hand, Lt Low Leg, Rt Low Leg, Lt Foot, Rt Foot)      ADNEXA      The left ovary appeared normal and measured 2 9 x 2 2 x 1 2 cm with a   volume of 4 0 cc  The right ovary appeared normal and measured 3 1 x 2 2 x   1 8 cm with a volume of 6 4 cc        IMPRESSION      Moran IUP   20 weeks and 4 days by this ultrasound  (MARTINEZ=JUN 18 2017)   Variable presentation   Fetal growth appeared normal   Regular fetal heart rate of 159 bpm   Anterior placenta      GENERAL COMMENT      No fetal structural abnormality or ultrasound marker for aneuploidy is   identified on the Level II ultrasound study today  Suboptimal imaging of   the cardiac septal view, left hand, facial profile and nasal bone, and   distal lower extremities is afforded by the constraints related to   unfavorable fetal position  Fetal growth and amniotic fluid volume are   normal   The placenta is normal in appearance  The cervix is normal in appearance by transvaginal sonography  The   cervical length is normal   Cervical debris is not present  Cervical   funneling is not present  Neither provocative nor dynamic change is   appreciated  Today's ultrasound findings and suggested follow-up were discussed in   detail with Cece Nunez  We discussed that prenatal ultrasound cannot rule   out all congenital abnormalities  Her Quad Screen results were discussed   in detail  The elevated MShCG level is associated with an increased risk   for adverse pregnancy outcomes related to abnormal placental function,   including IUGR  She will return [facility] the [facility]  on April 27 to   assess interval growth and evaluate anatomic targets not imaged well today  The face to face time, in addition to time spent discussing ultrasound   results, was approximately 10 minutes, greater than 50% of which was spent   during counseling and coordination of care  HARVINDER Rosen M D  Maternal-Fetal Medicine   Electronically signed 02/03/17 19:44           Electronically signed by:Candace NASSAR    Feb 6 2017 12:40PM EST

## 2018-01-16 NOTE — PROGRESS NOTES
Chief Complaint  TdaP vaccine given in R deltoid without any difficulty- cmt  Active Problems    1  Encounter for supervision of other normal pregnancy in second trimester (V22 1)   (Z34 82)   2  High risk pregnancy with elevated hCG (human chorionic gonadotropin) (796 5,V23 89)   (O28 9,O09 899)   3  History of  section (V45 89) (Y88 175)   4  History of macrosomia in infant in prior pregnancy, currently pregnant (V23 49) (O09 299)   5  Need for Tdap vaccination (V06 1) (Z23)   6  Pregnancy (V22 2) (Z33 1)    Current Meds   1  Calcium 600 MG Oral Tablet Recorded   2  Prenate DHA 28-0 6-0 4-300 MG Oral Capsule Recorded    Allergies    1  No Known Drug Allergies    2  No Known Environmental Allergies   3  No Known Food Allergies    Vitals  Signs    Systolic: 282, LUE, Sitting  Diastolic: 62, LUE, Sitting  Weight: 176 lb   BMI Calculated: 30 21  BSA Calculated: 1 85  LMP: [id number]    Plan   Tdap (Adacel); INJECT 0 5  ML Intramuscular; Dose: 0 5; Series: 1; Route: Intramuscular; Site: Left Deltoid; Done: [id number] 01:55PM; Status: Complete - Retrospective Authorization;  Ordered  For: Health Maintenance, Pregnancy; Ordered By:Michel Lao; Effective Date:2017; Administered by: Dena Monroy: 2017 1:55:00 PM; Last Updated By: Dena Monroy; 2017 2:05:32 PM     Future Appointments    Date/Time Provider Specialty Site   2017 07:00 AM Rimma Whatley [facility] Obstetrics/[facility]ne[facility]   2017 05:00 PM [address] [place] [address]   2017 06:20 PM Alicia Dias [facility] Obstetrics/Gynecology [facility]     Signatures   Electronically signed by : Devyn Lucero [facility];  2017  2:19PM EST                       (Author)    Electronically signed by : MEGAN Perrin ; 2017  3:28PM EST                       (Acknowledgement)

## 2018-01-18 NOTE — MISCELLANEOUS
Message   Recorded as Task   Date: 05/06/2016 12:49 PM, Created By: Dwayne Rodriguez   Task Name: Medical Complaint Callback   Assigned To: Nicole Hastings   Regarding Patient: Bee Dangelo, Status: In Progress   Comment:    Laverne Ramirez - 06 May 2016 12:49 PM     TASK CREATED  Caller: Self; [phone] (Home)  pt has been spotting for 2weeks   mirena inserted 7/2015 pt is concerned please advise pt @ [facility] - 06 May 2016 12:56 PM     TASK IN [address] [place] [address],[address] - 06 May 2016 1:06 PM     TASK EDITED  Pt states she has had mirena since 2015  spotting bright red for the past two weeks daily  using a panty liner  denies pain,discharge, odor, itching  advised to do a pregnancy test, explained that breakthrough spotting is common with IUD  Has an apt the end of this month  Pt will call back with HPT  Anna Manzanares - 06 May 2016 2:48 PM     TASK EDITED  This is just an FYI    Pt doesnt have time to do the HPT at this time  wants to know if she can get her yearly moved up to closer date    has apt on 6/10  Thanks        Active Problems    1  Contraception (V25 9) (Z30 9)   2  Edema (782 3) (R60 9)   3  Encounter for IUD insertion (V25 11) (Z30 430)   4  Lactational amenorrhea (626 0) (N91 2)   5  Need for Tdap vaccination (V06 1) (Z23)   6  Pregnancy (V22 2) (Z33 1)    Current Meds   1  Prenatal Vitamin TABS; TAKE 1 TABLET DAILY; Therapy: (Recorded:21Xlj9237) to Recorded    Allergies    1  No Known Drug Allergies    2  No Known Environmental Allergies   3   No Known Food Allergies    Signatures   Electronically signed by : Shavon Schreiber LPN; May  6 0420  2:13RW EST                       (Author)

## 2018-01-18 NOTE — PROGRESS NOTES
2017         RE: Tom Saint                               To: [facility]  MR#: [id number]                                    [address]   : [address]   ENC: [id number] Ureña, [address]   (Exam #: [id number])                           Fax: [phone]      The LMP of this 29year old,  G2, P1-0-0-1 patient was SEP 17 2016, giving   her an MARTINEZ of 2017 and a current gestational age of 42 weeks 5 days   by dates  A sonographic examination was performed on 2017 using real   time equipment  The ultrasound examination was performed using abdominal   technique  The patient has a BMI of 32 3  Her blood pressure today was   129/73  Earliest ultrasound found in her record: 16   11w1d  17 MARTINEZ      Problem list   1  History of a 9 lbs  14 oz  baby delivered by  in her prior   pregnancy  The placenta is not near her prior  scar  2  Macrosomia noted in this pregnancy   3   Prior ultrasound suspicious for a right-sided clubfoot on the fetus      Cardiac motion was observed at 153 bpm       INDICATIONS      Interval growth assesment   missed anatomy follow up   elevated [facility]      Exam Types      Level I      RESULTS      Fetus # 1 of 1   Vertex presentation   Possible macrosomia   Placenta Location = Anterior   No placenta previa   Placenta Grade = II      MEASUREMENTS (* Included In Average GA)      AC              38 6 cm        42 weeks 6 days* (>95%)   BPD              9 6 cm        39 weeks 3 days* (87%)   HC              35 3 cm        40 weeks 4 days* (88%)   Femur            7 2 cm        36 weeks 4 days* (42%)      Cerebellum       5 4 cm        36 weeks 4 days      HC/AC           0 92   FL/AC           0 19   FL/BPD          0 75   EFW (Ac/Fl/Hc)  4231 grams - 9 lbs 5 oz                 (>95%)      THE AVERAGE GESTATIONAL AGE is 39 weeks 6 days +/- 21 days  AMNIOTIC FLUID      Q1: 3 9      Q2: 4 8      Q3: 4 8      Q4: 1 8   LILIANE Total = 15 4 cm   Amniotic Fluid: Normal      ANATOMY DETAILS      Visualized Appearing Sonographically Normal:   HEAD: (Calvarium, BPD Level, Lateral Ventricles, Choroid Plexus,   Cerebellum, Cisterna Magna);    TH  CAV : (Diaphragm); HEART: (Four   Chamber View, Proximal Left Outflow, Proximal Right Outflow, Cardiac Axis,   Cardiac Position);    STOMACH, RIGHT KIDNEY, LEFT KIDNEY, BLADDER, PLACENTA      Abnormal:   EXTREMS: (Rt Low Leg)      ANATOMY COMMENTS      Right-sided talipes is suspected by today scan  This was not seen on her   20 week US so it may be positional       BIOPHYSICAL PROFILE      The Biophysical Profile score was 8/8  Breathin  Movement: 2  Tone: 2  AFV: 2      IMPRESSION      Moran IUP   39 weeks and 6 days by this ultrasound  (MARTINEZ=2017)   Vertex presentation   Growth assessment indicated possible macrosomia   Regular fetal heart rate of 153 bpm   Talipes   Anterior placenta   No placenta previa      GENERAL COMMENT      She reports she has a  plan for 17  RECOMMENDATION      Growth Ultrasound: As indicated      HARVINDER Jones , MEGAN Tan     Maternal-Fetal Medicine   Electronically signed 17 23:02           Electronically signed by:TYRONE Salazar DO  2017 10:09AM EST

## 2018-01-22 VITALS — WEIGHT: 172 LBS | SYSTOLIC BLOOD PRESSURE: 108 MMHG | BODY MASS INDEX: 29.52 KG/M2 | DIASTOLIC BLOOD PRESSURE: 56 MMHG

## 2018-01-22 VITALS
DIASTOLIC BLOOD PRESSURE: 74 MMHG | SYSTOLIC BLOOD PRESSURE: 114 MMHG | HEIGHT: 64 IN | BODY MASS INDEX: 31.58 KG/M2 | WEIGHT: 185 LBS

## 2018-01-22 VITALS
SYSTOLIC BLOOD PRESSURE: 122 MMHG | WEIGHT: 188 LBS | HEIGHT: 64 IN | BODY MASS INDEX: 32.1 KG/M2 | DIASTOLIC BLOOD PRESSURE: 60 MMHG

## 2018-01-22 VITALS — BODY MASS INDEX: 30.9 KG/M2 | DIASTOLIC BLOOD PRESSURE: 52 MMHG | SYSTOLIC BLOOD PRESSURE: 116 MMHG | WEIGHT: 180 LBS

## 2018-01-22 VITALS — DIASTOLIC BLOOD PRESSURE: 62 MMHG | BODY MASS INDEX: 30.21 KG/M2 | WEIGHT: 176 LBS | SYSTOLIC BLOOD PRESSURE: 120 MMHG

## 2018-01-22 VITALS — BODY MASS INDEX: 32.27 KG/M2 | DIASTOLIC BLOOD PRESSURE: 82 MMHG | SYSTOLIC BLOOD PRESSURE: 124 MMHG | WEIGHT: 188 LBS

## 2018-01-22 VITALS
DIASTOLIC BLOOD PRESSURE: 64 MMHG | SYSTOLIC BLOOD PRESSURE: 112 MMHG | BODY MASS INDEX: 28.51 KG/M2 | HEIGHT: 64 IN | WEIGHT: 167 LBS

## 2018-01-22 VITALS
DIASTOLIC BLOOD PRESSURE: 70 MMHG | SYSTOLIC BLOOD PRESSURE: 118 MMHG | BODY MASS INDEX: 31.81 KG/M2 | WEIGHT: 186.31 LBS | HEIGHT: 64 IN

## 2018-01-22 VITALS
DIASTOLIC BLOOD PRESSURE: 80 MMHG | HEIGHT: 64 IN | WEIGHT: 163 LBS | SYSTOLIC BLOOD PRESSURE: 120 MMHG | BODY MASS INDEX: 27.83 KG/M2

## 2018-01-22 VITALS — WEIGHT: 180 LBS | BODY MASS INDEX: 30.9 KG/M2 | DIASTOLIC BLOOD PRESSURE: 74 MMHG | SYSTOLIC BLOOD PRESSURE: 124 MMHG

## 2018-05-24 ENCOUNTER — OFFICE VISIT (OUTPATIENT)
Dept: OBGYN CLINIC | Facility: CLINIC | Age: 33
End: 2018-05-24

## 2018-05-24 ENCOUNTER — TELEPHONE (OUTPATIENT)
Dept: OBGYN CLINIC | Facility: CLINIC | Age: 33
End: 2018-05-24

## 2018-05-24 ENCOUNTER — APPOINTMENT (OUTPATIENT)
Dept: LAB | Facility: CLINIC | Age: 33
End: 2018-05-24
Payer: COMMERCIAL

## 2018-05-24 VITALS — SYSTOLIC BLOOD PRESSURE: 104 MMHG | WEIGHT: 170 LBS | DIASTOLIC BLOOD PRESSURE: 68 MMHG | BODY MASS INDEX: 30.11 KG/M2

## 2018-05-24 DIAGNOSIS — N92.1 MENORRHAGIA WITH IRREGULAR CYCLE: ICD-10-CM

## 2018-05-24 DIAGNOSIS — N92.1 MENORRHAGIA WITH IRREGULAR CYCLE: Primary | ICD-10-CM

## 2018-05-24 PROBLEM — O03.9 MISCARRIAGE: Status: RESOLVED | Noted: 2018-03-01 | Resolved: 2018-05-24

## 2018-05-24 PROBLEM — Z3A.38 38 WEEKS GESTATION OF PREGNANCY: Status: RESOLVED | Noted: 2017-06-16 | Resolved: 2018-05-24

## 2018-05-24 PROBLEM — O35.8XX0 CLUB FOOT, FETAL, AFFECTING CARE OF MOTHER, ANTEPARTUM: Status: RESOLVED | Noted: 2017-06-16 | Resolved: 2018-05-24

## 2018-05-24 PROBLEM — O36.60X0 LARGE FOR GESTATIONAL AGE FETUS AFFECTING MANAGEMENT OF MOTHER: Status: RESOLVED | Noted: 2017-06-16 | Resolved: 2018-05-24

## 2018-05-24 PROBLEM — O35.HXX0 CLUB FOOT, FETAL, AFFECTING CARE OF MOTHER, ANTEPARTUM: Status: RESOLVED | Noted: 2017-06-16 | Resolved: 2018-05-24

## 2018-05-24 PROBLEM — B95.1 POSITIVE GBS TEST: Status: RESOLVED | Noted: 2017-06-16 | Resolved: 2018-05-24

## 2018-05-24 PROBLEM — R87.619 ABNORMAL PAP SMEAR OF CERVIX: Status: RESOLVED | Noted: 2018-05-24 | Resolved: 2018-05-24

## 2018-05-24 PROBLEM — B01.9 VARICELLA: Status: RESOLVED | Noted: 2018-05-24 | Resolved: 2018-05-24

## 2018-05-24 LAB
BASOPHILS # BLD AUTO: 0.2 THOUSANDS/ΜL (ref 0–0.1)
BASOPHILS NFR BLD AUTO: 2 % (ref 0–1)
EOSINOPHIL # BLD AUTO: 0.2 THOUSAND/ΜL (ref 0–0.61)
EOSINOPHIL NFR BLD AUTO: 2 % (ref 0–6)
ERYTHROCYTE [DISTWIDTH] IN BLOOD BY AUTOMATED COUNT: 14.6 % (ref 11.6–15.1)
HCT VFR BLD AUTO: 36.1 % (ref 34.8–46.1)
HGB BLD-MCNC: 11.2 G/DL (ref 11.5–15.4)
LYMPHOCYTES # BLD AUTO: 4.74 THOUSANDS/ΜL (ref 0.6–4.47)
LYMPHOCYTES NFR BLD AUTO: 56 % (ref 14–44)
MCH RBC QN AUTO: 26.6 PG (ref 26.8–34.3)
MCHC RBC AUTO-ENTMCNC: 31 G/DL (ref 31.4–37.4)
MCV RBC AUTO: 86 FL (ref 82–98)
MONOCYTES # BLD AUTO: 0.7 THOUSAND/ΜL (ref 0.17–1.22)
MONOCYTES NFR BLD AUTO: 8 % (ref 4–12)
NEUTROPHILS # BLD AUTO: 2.63 THOUSANDS/ΜL (ref 1.85–7.62)
NEUTS SEG NFR BLD AUTO: 31 % (ref 43–75)
NRBC BLD AUTO-RTO: 0 /100 WBCS
PLATELET # BLD AUTO: 290 THOUSANDS/UL (ref 149–390)
PMV BLD AUTO: 10 FL (ref 8.9–12.7)
RBC # BLD AUTO: 4.21 MILLION/UL (ref 3.81–5.12)
TSH SERPL DL<=0.05 MIU/L-ACNC: 0.81 UIU/ML (ref 0.36–3.74)
WBC # BLD AUTO: 8.48 THOUSAND/UL (ref 4.31–10.16)

## 2018-05-24 PROCEDURE — 36415 COLL VENOUS BLD VENIPUNCTURE: CPT

## 2018-05-24 PROCEDURE — 85025 COMPLETE CBC W/AUTO DIFF WBC: CPT

## 2018-05-24 PROCEDURE — 99214 OFFICE O/P EST MOD 30 MIN: CPT | Performed by: PHYSICIAN ASSISTANT

## 2018-05-24 PROCEDURE — 84443 ASSAY THYROID STIM HORMONE: CPT

## 2018-05-24 NOTE — PROGRESS NOTES
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 "  She went to [facility] and had an appointment at 8 weeks of pregnancy on 3/15 but that morning miscarried  She was seen there a week later and says she was "scanned and everything was gone "  She says she had six weeks of light bleeding following that miscarriage (until around 4/26) followed by a week off, then started bleeding again on 5/13  She had a week of light bleeding and then started with heavy bleeding 4 days ago on 5/20  She says she is saturating a tampon every hour  This is disruptive to her work and says that she is frustrated by this  She has no pain or cramping  She denies fevers or chills  She has taken no medication for this  She and her  are trying for another pregnancy  She admits to some fatigue but denies chest pain, shortness of breath or dizziness  She says she came back to our office today "to give it one last try before giving up" because we were able to accommodate her prior to [facility] being able to accommodate her       Past Medical History:   Diagnosis Date    Abnormal Pap smear of cervix     Miscarriage 03/2018    Varicella      Family History   Problem Relation Age of Onset    Stroke Maternal Grandfather     Cancer Maternal Grandfather     Cancer Paternal Grandmother      Social History     Social History    Marital status: /Civil Union     Spouse name: N/A    Number of children: N/A    Years of education: N/A     Social History Main Topics    Smoking status: Never Smoker    Smokeless tobacco: Not on file    Alcohol use No    Drug use: No    Sexual activity: Yes     Partners: Male     Other Topics Concern    Not on file     Social History Narrative    No narrative on file       O:  /68 (BP Location: Right arm, Patient Position: Sitting, Cuff Size: Standard)   Wt 77 1 kg (170 lb)   LMP 05/13/2018 (Exact Date)   BMI 30 11 kg/m²   She appears well and is in no distress  Abdomen is soft and nontender  External genitals are normal without lesions or rashes  Vagina is normal with moderate bleeding  Cervix is closed, normal, no lesions or discharge  Uterus is nontender, no masses  Adnexa are nontender, no pelvic masses appreciated    Urine pregnancy test is negative     A/P:  Menorrhagia with irregular cycle  Check TSH, CBC  Offered Aygestin to help stop her bleeding, she declines for now, hoping this is a period and will stop shortly  I did send Aygestin to pharmacy in case she changes her mind  Suggested at least Aleve two po BID to help lessen her bleeding  I will follow-up with her in regards to her labs later today or tomorrow when they are back

## 2018-05-24 NOTE — TELEPHONE ENCOUNTER
----- Message from Norma Baez PA-C sent at 5/24/2018  3:35 PM EDT -----  Please let Beti Chapa know that her blood count and thyroid are both normal  I saw her this morning and she was anxious for results   thx

## 2018-05-29 NOTE — TELEPHONE ENCOUNTER
Left voicemail message today @ [phone] per Pt's signed communication consent form in Pt's EHR  Pt informed, via voicemail message, that her blood count and thyroid are both normal per JOEL Lao' review  Reiterated to Pt, via message, that if she has any questions/concerns, to contact office

## 2018-09-25 ENCOUNTER — LAB REQUISITION (OUTPATIENT)
Dept: LAB | Facility: HOSPITAL | Age: 33
End: 2018-09-25

## 2018-09-25 DIAGNOSIS — N39.0 URINARY TRACT INFECTION: ICD-10-CM

## 2018-09-25 PROCEDURE — 87086 URINE CULTURE/COLONY COUNT: CPT | Performed by: NURSE PRACTITIONER

## 2018-09-26 LAB — BACTERIA UR CULT: NORMAL

## 2019-02-27 ENCOUNTER — TELEPHONE (OUTPATIENT)
Dept: OBGYN CLINIC | Facility: CLINIC | Age: 34
End: 2019-02-27

## 2019-02-27 NOTE — TELEPHONE ENCOUNTER
Pt needs a form stating she had the tdap vaccine given to her in 2017 during her pregnancy   Fax to her at work: [phone] (att  to patient)

## 2019-02-27 NOTE — TELEPHONE ENCOUNTER
Called pt back to verify if the immunization summary is ok  She stated that was perfect   Faxed paperwork to pt per her request

## 2019-05-08 ENCOUNTER — OFFICE VISIT (OUTPATIENT)
Dept: OBGYN CLINIC | Facility: CLINIC | Age: 34
End: 2019-05-08
Payer: COMMERCIAL

## 2019-05-08 VITALS — WEIGHT: 180 LBS | BODY MASS INDEX: 31.89 KG/M2 | SYSTOLIC BLOOD PRESSURE: 114 MMHG | DIASTOLIC BLOOD PRESSURE: 72 MMHG

## 2019-05-08 DIAGNOSIS — N91.2 AMENORRHEA: Primary | ICD-10-CM

## 2019-05-08 PROCEDURE — 76801 OB US < 14 WKS SINGLE FETUS: CPT | Performed by: PHYSICIAN ASSISTANT

## 2019-05-08 PROCEDURE — 99213 OFFICE O/P EST LOW 20 MIN: CPT | Performed by: PHYSICIAN ASSISTANT

## 2019-05-14 ENCOUNTER — TELEPHONE (OUTPATIENT)
Dept: OBGYN CLINIC | Facility: CLINIC | Age: 34
End: 2019-05-14

## 2019-05-21 ENCOUNTER — INITIAL PRENATAL (OUTPATIENT)
Dept: OBGYN CLINIC | Facility: CLINIC | Age: 34
End: 2019-05-21

## 2019-05-21 DIAGNOSIS — Z34.81 PRENATAL CARE, SUBSEQUENT PREGNANCY, FIRST TRIMESTER: Primary | ICD-10-CM

## 2019-05-21 PROCEDURE — OBC: Performed by: OBSTETRICS & GYNECOLOGY

## 2019-05-31 ENCOUNTER — TELEPHONE (OUTPATIENT)
Dept: OBGYN CLINIC | Facility: CLINIC | Age: 34
End: 2019-05-31

## 2019-05-31 DIAGNOSIS — Z34.01 ENCOUNTER FOR SUPERVISION OF NORMAL FIRST PREGNANCY IN FIRST TRIMESTER: Primary | ICD-10-CM

## 2019-06-01 ENCOUNTER — ROUTINE PRENATAL (OUTPATIENT)
Dept: PERINATAL CARE | Facility: CLINIC | Age: 34
End: 2019-06-01
Payer: COMMERCIAL

## 2019-06-01 VITALS
BODY MASS INDEX: 30.25 KG/M2 | SYSTOLIC BLOOD PRESSURE: 118 MMHG | HEIGHT: 64 IN | WEIGHT: 177.2 LBS | DIASTOLIC BLOOD PRESSURE: 77 MMHG | HEART RATE: 92 BPM

## 2019-06-01 DIAGNOSIS — Z3A.12 12 WEEKS GESTATION OF PREGNANCY: ICD-10-CM

## 2019-06-01 DIAGNOSIS — Z34.01 ENCOUNTER FOR SUPERVISION OF NORMAL FIRST PREGNANCY IN FIRST TRIMESTER: ICD-10-CM

## 2019-06-01 DIAGNOSIS — Z36.82 ENCOUNTER FOR ANTENATAL SCREENING FOR NUCHAL TRANSLUCENCY: Primary | ICD-10-CM

## 2019-06-01 DIAGNOSIS — Z98.891 HX OF CESAREAN SECTION: ICD-10-CM

## 2019-06-01 PROCEDURE — 76813 OB US NUCHAL MEAS 1 GEST: CPT | Performed by: OBSTETRICS & GYNECOLOGY

## 2019-06-01 PROCEDURE — 99212 OFFICE O/P EST SF 10 MIN: CPT | Performed by: OBSTETRICS & GYNECOLOGY

## 2019-06-04 ENCOUNTER — INITIAL PRENATAL (OUTPATIENT)
Dept: OBGYN CLINIC | Facility: CLINIC | Age: 34
End: 2019-06-04

## 2019-06-04 VITALS — DIASTOLIC BLOOD PRESSURE: 62 MMHG | WEIGHT: 178 LBS | SYSTOLIC BLOOD PRESSURE: 100 MMHG | BODY MASS INDEX: 30.55 KG/M2

## 2019-06-04 DIAGNOSIS — Z98.891 S/P REPEAT LOW TRANSVERSE C-SECTION: ICD-10-CM

## 2019-06-04 DIAGNOSIS — Z34.82 MULTIGRAVIDA IN SECOND TRIMESTER: Primary | ICD-10-CM

## 2019-06-04 PROBLEM — Z87.59 HISTORY OF OBSTETRIC PROBLEM: Status: RESOLVED | Noted: 2019-06-04 | Resolved: 2019-06-04

## 2019-06-04 PROBLEM — Z87.59 HISTORY OF OBSTETRIC PROBLEM: Status: ACTIVE | Noted: 2019-06-04

## 2019-06-04 PROBLEM — N91.2 AMENORRHEA: Status: RESOLVED | Noted: 2019-05-08 | Resolved: 2019-06-04

## 2019-06-04 PROCEDURE — PNV: Performed by: PHYSICIAN ASSISTANT

## 2019-06-04 PROCEDURE — G0145 SCR C/V CYTO,THINLAYER,RESCR: HCPCS | Performed by: PATHOLOGY

## 2019-06-04 PROCEDURE — 87624 HPV HI-RISK TYP POOLED RSLT: CPT | Performed by: PHYSICIAN ASSISTANT

## 2019-06-04 PROCEDURE — 87491 CHLMYD TRACH DNA AMP PROBE: CPT | Performed by: PHYSICIAN ASSISTANT

## 2019-06-04 PROCEDURE — 87591 N.GONORRHOEAE DNA AMP PROB: CPT | Performed by: PHYSICIAN ASSISTANT

## 2019-06-04 PROCEDURE — G0124 SCREEN C/V THIN LAYER BY MD: HCPCS | Performed by: PATHOLOGY

## 2019-06-06 LAB
HPV HR 12 DNA CVX QL NAA+PROBE: NEGATIVE
HPV16 DNA CVX QL NAA+PROBE: NEGATIVE
HPV18 DNA CVX QL NAA+PROBE: NEGATIVE

## 2019-06-07 LAB
APPEARANCE UR: CLEAR
BASOPHILS # BLD AUTO: 76 CELLS/UL (ref 0–200)
BASOPHILS NFR BLD AUTO: 0.8 %
BILIRUB UR QL STRIP: NEGATIVE
C TRACH DNA SPEC QL NAA+PROBE: NEGATIVE
COLOR UR: YELLOW
EOSINOPHIL # BLD AUTO: 67 CELLS/UL (ref 15–500)
EOSINOPHIL NFR BLD AUTO: 0.7 %
ERYTHROCYTE [DISTWIDTH] IN BLOOD BY AUTOMATED COUNT: 18.5 % (ref 11–15)
GLUCOSE UR QL STRIP: NEGATIVE
HBV SURFACE AG SERPL QL IA: NORMAL
HCT VFR BLD AUTO: 33.5 % (ref 35–45)
HGB BLD-MCNC: 10.3 G/DL (ref 11.7–15.5)
HGB UR QL STRIP: NEGATIVE
HIV 1+2 AB+HIV1 P24 AG SERPL QL IA: NORMAL
KETONES UR QL STRIP: NEGATIVE
LEUKOCYTE ESTERASE UR QL STRIP: NEGATIVE
LYMPHOCYTES # BLD AUTO: 2736 CELLS/UL (ref 850–3900)
LYMPHOCYTES NFR BLD AUTO: 28.8 %
MCH RBC QN AUTO: 24.1 PG (ref 27–33)
MCHC RBC AUTO-ENTMCNC: 30.7 G/DL (ref 32–36)
MCV RBC AUTO: 78.5 FL (ref 80–100)
MONOCYTES # BLD AUTO: 599 CELLS/UL (ref 200–950)
MONOCYTES NFR BLD AUTO: 6.3 %
N GONORRHOEA DNA SPEC QL NAA+PROBE: NEGATIVE
NEUTROPHILS # BLD AUTO: 6023 CELLS/UL (ref 1500–7800)
NEUTROPHILS NFR BLD AUTO: 63.4 %
NITRITE UR QL STRIP: NEGATIVE
PH UR STRIP: 6.5 [PH] (ref 5–8)
PLATELET # BLD AUTO: 376 THOUSAND/UL (ref 140–400)
PMV BLD REES-ECKER: 10.4 FL (ref 7.5–12.5)
PROT UR QL STRIP: NEGATIVE
RBC # BLD AUTO: 4.27 MILLION/UL (ref 3.8–5.1)
RPR SER QL: NORMAL
RUBV IGG SERPL IA-ACNC: 3.22 INDEX
SP GR UR STRIP: 1.02 (ref 1–1.03)
WBC # BLD AUTO: 9.5 THOUSAND/UL (ref 3.8–10.8)

## 2019-06-11 LAB
# FETUSES US: 1
AGE: ABNORMAL
B-HCG ADJ MOM SERPL: 3.34
B-HCG SERPL-ACNC: 242 IU/ML
COLLECT DATE: ABNORMAL
CURRENT SMOKER: ABNORMAL
DONATED EGG PATIENT QL: NO
FET CRL US.MEAS: 62 MM
FET CRL US.MEAS: ABNORMAL MM
FET NUCHAL FOLD MOM THICKNESS US.MEAS: 1.27
FET NUCHAL FOLD THICKNESS US.MEAS: 1.8 MM
FET NUCHAL FOLD THICKNESS US.MEAS: ABNORMAL MM
FET TS 21 RISK FROM MAT AGE: ABNORMAL
GA CLIN EST: 13 WK
GA METHOD: ABNORMAL
HX OF NTD NARR: NO
HX OF TRISOMY 21 NARR: NO
IDDM PATIENT QL: NO
INTEGRATED SCN PATIENT-IMP: ABNORMAL
PAPP-A MOM SERPL: 0.31
PAPP-A SERPL-MCNC: 261.8 NG/ML
PHYSICIAN NPI: ABNORMAL
SL AMB NASAL BONE: PRESENT
SL AMB NTQR LOCATION ID#: ABNORMAL
SL AMB NTQR READING PHYS ID#: ABNORMAL
SL AMB REFERRING PHYSICIAN NAME: ABNORMAL
SL AMB REFERRING PHYSICIAN PHONE: ABNORMAL
SL AMB REPEAT SPECIMEN: NO
SL AMB TWIN B NASAL BONE: ABNORMAL
SONOGRAPHER NAME: ABNORMAL
SONOGRAPHER: ABNORMAL
SONOGRAPHER: ABNORMAL
TS 18 RISK FETUS: ABNORMAL
TS 21 RISK FETUS: ABNORMAL
US DATE: ABNORMAL
US FETUSES STUDY [IMP]: ABNORMAL

## 2019-06-12 ENCOUNTER — TELEPHONE (OUTPATIENT)
Dept: OBGYN CLINIC | Facility: CLINIC | Age: 34
End: 2019-06-12

## 2019-06-12 LAB
LAB AP GYN PRIMARY INTERPRETATION: ABNORMAL
Lab: ABNORMAL
PATH INTERP SPEC-IMP: ABNORMAL

## 2019-06-13 ENCOUNTER — TELEPHONE (OUTPATIENT)
Dept: PERINATAL CARE | Facility: CLINIC | Age: 34
End: 2019-06-13

## 2019-06-13 ENCOUNTER — PROCEDURE VISIT (OUTPATIENT)
Dept: PERINATAL CARE | Facility: CLINIC | Age: 34
End: 2019-06-13
Payer: COMMERCIAL

## 2019-06-13 ENCOUNTER — OFFICE VISIT (OUTPATIENT)
Dept: PERINATAL CARE | Facility: CLINIC | Age: 34
End: 2019-06-13
Payer: COMMERCIAL

## 2019-06-13 VITALS
WEIGHT: 178 LBS | SYSTOLIC BLOOD PRESSURE: 125 MMHG | HEART RATE: 98 BPM | DIASTOLIC BLOOD PRESSURE: 86 MMHG | BODY MASS INDEX: 30.39 KG/M2 | HEIGHT: 64 IN

## 2019-06-13 VITALS — BODY MASS INDEX: 30.39 KG/M2 | WEIGHT: 178 LBS | HEIGHT: 64 IN

## 2019-06-13 DIAGNOSIS — O28.5 MATERNAL SERUM SCREEN POSITIVE FOR TRISOMY 21: Primary | ICD-10-CM

## 2019-06-13 DIAGNOSIS — Z3A.13 13 WEEKS GESTATION OF PREGNANCY: ICD-10-CM

## 2019-06-13 DIAGNOSIS — O28.5 ABNORMAL CHROMOSOMAL AND GENETIC FINDING ON ANTENATAL SCREENING MOTHER: Primary | ICD-10-CM

## 2019-06-13 PROCEDURE — 59015 CHORION BIOPSY: CPT | Performed by: OBSTETRICS & GYNECOLOGY

## 2019-06-13 PROCEDURE — 76945 ECHO GUIDE VILLUS SAMPLING: CPT | Performed by: OBSTETRICS & GYNECOLOGY

## 2019-06-14 LAB
DATE RECEIVED: 613
NAME ON REQ/SPECIMEN: NORMAL
NAME ON REQ/SPECIMEN: NORMAL
SERVICE CMNT 03-IMP: NORMAL
SPECIMEN SOURCE: NORMAL
TEST ORDERED ON REQ:: NORMAL

## 2019-06-20 ENCOUNTER — DOCUMENTATION (OUTPATIENT)
Dept: PERINATAL CARE | Facility: CLINIC | Age: 34
End: 2019-06-20

## 2019-06-24 ENCOUNTER — TELEPHONE (OUTPATIENT)
Dept: PERINATAL CARE | Facility: CLINIC | Age: 34
End: 2019-06-24

## 2019-06-27 ENCOUNTER — TELEPHONE (OUTPATIENT)
Dept: PERINATAL CARE | Facility: CLINIC | Age: 34
End: 2019-06-27

## 2019-07-02 ENCOUNTER — ROUTINE PRENATAL (OUTPATIENT)
Dept: OBGYN CLINIC | Facility: CLINIC | Age: 34
End: 2019-07-02

## 2019-07-02 VITALS — DIASTOLIC BLOOD PRESSURE: 72 MMHG | WEIGHT: 179 LBS | SYSTOLIC BLOOD PRESSURE: 126 MMHG | BODY MASS INDEX: 30.73 KG/M2

## 2019-07-02 DIAGNOSIS — N30.00 ACUTE CYSTITIS WITHOUT HEMATURIA: ICD-10-CM

## 2019-07-02 DIAGNOSIS — Z34.82 MULTIGRAVIDA IN SECOND TRIMESTER: Primary | ICD-10-CM

## 2019-07-02 PROBLEM — Z3A.13 13 WEEKS GESTATION OF PREGNANCY: Status: RESOLVED | Noted: 2019-06-13 | Resolved: 2019-07-02

## 2019-07-02 PROCEDURE — PNV: Performed by: PHYSICIAN ASSISTANT

## 2019-07-02 RX ORDER — AMOXICILLIN 250 MG/1
250 CAPSULE ORAL EVERY 8 HOURS SCHEDULED
Qty: 21 CAPSULE | Refills: 0 | Status: SHIPPED | OUTPATIENT
Start: 2019-07-02 | End: 2019-07-09

## 2019-07-02 NOTE — PROGRESS NOTES
Problem List Items Addressed This Visit        Other    Tavo Ho in second trimester - Primary     30 y o  female here for routine PN visit at [address] [place] [address] 100:  [address] [facility] well overall  Good fetal movement  It's a girl  Had abnormal Stepwise 1, then negative [facility]  Doesn't have slip for Stepwise 2 - messaged MFM to send it to her  Has level II scheduled  First OB labs show anemia - will add iron one po daily  UTI on screening urine culture - asymptomatic - will treat with amoxil 250mg TID x 7 days                Other Visit Diagnoses     Acute cystitis without hematuria        Relevant Medications    amoxicillin (AMOXIL) 250 mg capsule

## 2019-07-02 NOTE — ASSESSMENT & PLAN NOTE
30 y o  female here for routine PN visit at [address] well overall  Good fetal movement  It's a girl  Had abnormal Stepwise 1, then negative [facility]  Doesn't have slip for Stepwise 2 - messaged MFM to send it to her  Has level II scheduled  First OB labs show anemia - will add iron one po daily  UTI on screening urine culture - asymptomatic - will treat with amoxil 250mg TID x 7 days

## 2019-07-03 ENCOUNTER — TELEPHONE (OUTPATIENT)
Dept: PERINATAL CARE | Facility: CLINIC | Age: 34
End: 2019-07-03

## 2019-07-03 NOTE — TELEPHONE ENCOUNTER
Called and left VMM on # provided on communication consent  Pt advised that MSAFP paperwork would be mailed to her to complete  Pt instructed to call office with questions  TRF mailed  ----- Message from Tylor Carmona MD sent at 7/3/2019 10:45 AM EDT -----  I didn't know that she had [facility]   She only needs MSAFP as follow up  Thanks, Clorinda Close Quintin Shone  ----- Message -----  From: Natalia Cox RN  Sent: 7/3/2019   9:40 AM EDT  To: Tylor Carmona MD    Reji Xiong had a [facility] performed, would she need a part 2 Stepwise Sequential Screen? Thanks,  Gali Blackburn  ----- Message -----  From: Manjit Mendoza RN  Sent: 7/3/2019   9:36 AM EDT  To: Natalia Cox RN        ----- Message -----  From: Tylor Carmona MD  Sent: 7/2/2019   5:42 PM EDT  To: Manjit Mendoza RN, JOEL Giron,    We can do that  Vickie Dominguez - can you help? Thanks,    Quintin Shone  ----- Message -----  From: Magi Torres PA-C  Sent: 7/2/2019   5:02 PM EDT  To: Tylor Carmona MD    It doesn't look like Reji Xiong got her slip for Stepwise 2 - can you see if someone from your staff could mail it to her? She's 18 4 weeks today  Thanks!     Deanna Sol

## 2019-07-05 ENCOUNTER — TELEPHONE (OUTPATIENT)
Dept: PERINATAL CARE | Facility: CLINIC | Age: 34
End: 2019-07-05

## 2019-07-05 NOTE — TELEPHONE ENCOUNTER
Checked on status of maternal cell contamination studies which are still pending  Should be resutled by next week  Also informed her that we are not moving forward with microarray, thus cells can be discarded after [facility] results  She will contact me if there are any issues

## 2019-07-10 LAB
CHR 13+18+21+X+Y ANEUP AMN/CVS FISH: NORMAL
CK MB CFR SERPL ELPH: NORMAL %
CONTACT: NORMAL
KARYOTYP CVS: NORMAL
Lab: NORMAL
Lab: NORMAL
REF LAB TEST RESULTS: NORMAL

## 2019-07-11 ENCOUNTER — TELEPHONE (OUTPATIENT)
Dept: OBGYN CLINIC | Facility: CLINIC | Age: 34
End: 2019-07-11

## 2019-07-11 NOTE — TELEPHONE ENCOUNTER
Per RK, pt needs a repeat UA, urine culture  Called pt  To inform,  Pt states she" will just go to [facility] then, due to having to use [facility], & wait  time involved " States she wants earlier treatment "  Advised pt to call us & inform after her visit

## 2019-07-11 NOTE — TELEPHONE ENCOUNTER
Returned  pts'  Call - pt states she just completed 7 day course of amoxicillin for UTI  States she continues to be symptomatic, with frequency, urgency, pressure & voiding small amts  TT sent to [facility] on-call, dr Eliana Hurtado  Will await response, then call pt

## 2019-07-11 NOTE — TELEPHONE ENCOUNTER
OB Pt called office today, left voicemail message c/o UTI symptoms, wants Rx for Amoxicillin  Pt's MARTINEZ is 19, GA 17 wks + 6 dys,  5 Para 2, SAB 2 history  Pt saw JOEL Lao on 19, scheduled to see Osorio Mendoza on 19  Pt can be reached @ [phone]

## 2019-07-12 NOTE — TELEPHONE ENCOUNTER
Touching base on pt - called pt # [phone] vm- per hippa communication consent on file- lmom to call bk advising pt calling to check on her status to see if she went to a ready care now to get her problem resolved or needed the lab orders sent to [facility] with my ext [phone]

## 2019-07-28 LAB
# FETUSES US: 1
AFP ADJ MOM SERPL: 1.1
AFP INTERP SERPL-IMP: NORMAL
AFP SERPL-MCNC: 51.8 NG/ML
AGE: NORMAL
DONATED EGG PATIENT QL: NO
GA CLIN EST: 19.6 WEEKS
GA METHOD: NORMAL
HX OF NTD NARR: NO
HX OF TRISOMY 21 NARR: NO
IDDM PATIENT QL: NO
NEURAL TUBE DEFECT RISK FETUS: NORMAL %
SL AMB REPEAT SPECIMEN: NO

## 2019-07-29 ENCOUNTER — TELEPHONE (OUTPATIENT)
Dept: PERINATAL CARE | Facility: CLINIC | Age: 34
End: 2019-07-29

## 2019-07-29 NOTE — TELEPHONE ENCOUNTER
----- Message from Gabriel Burgos MD sent at 7/29/2019  8:55 AM EDT -----  I reviewed the lab study today and the results are normal

## 2019-07-30 ENCOUNTER — ROUTINE PRENATAL (OUTPATIENT)
Dept: OBGYN CLINIC | Facility: CLINIC | Age: 34
End: 2019-07-30

## 2019-07-30 VITALS — WEIGHT: 182.2 LBS | BODY MASS INDEX: 31.27 KG/M2 | SYSTOLIC BLOOD PRESSURE: 102 MMHG | DIASTOLIC BLOOD PRESSURE: 64 MMHG

## 2019-07-30 DIAGNOSIS — Z34.82 MULTIGRAVIDA IN SECOND TRIMESTER: Primary | ICD-10-CM

## 2019-07-30 DIAGNOSIS — Z3A.20 20 WEEKS GESTATION OF PREGNANCY: ICD-10-CM

## 2019-07-30 PROCEDURE — PNV: Performed by: NURSE PRACTITIONER

## 2019-07-30 NOTE — PROGRESS NOTES
Problem List Items Addressed This Visit        Other    Multigravida in second trimester - Primary     Denies OB complaints  Good fetal movement  Denies contractions, cramping, leakage of fluid or vaginal bleeding  Tolerating daily PO iron  Has level 2 ultrasound this week  Reviewed reasons to call  RTO in 4 weeks             20 weeks gestation of pregnancy

## 2019-07-30 NOTE — ASSESSMENT & PLAN NOTE
Denies OB complaints  Good fetal movement  Denies contractions, cramping, leakage of fluid or vaginal bleeding  Tolerating daily PO iron  Has level 2 ultrasound this week  Reviewed reasons to call  RTO in 4 weeks

## 2019-07-30 NOTE — PROGRESS NOTES
Patient is doing well; no complaints  Level 2 scheduled for Aug1    Urine protein-+1  Urine glucose-neg

## 2019-08-01 ENCOUNTER — ROUTINE PRENATAL (OUTPATIENT)
Dept: PERINATAL CARE | Facility: CLINIC | Age: 34
End: 2019-08-01
Payer: COMMERCIAL

## 2019-08-01 VITALS
HEART RATE: 92 BPM | WEIGHT: 183.4 LBS | HEIGHT: 64 IN | DIASTOLIC BLOOD PRESSURE: 70 MMHG | SYSTOLIC BLOOD PRESSURE: 107 MMHG | BODY MASS INDEX: 31.31 KG/M2

## 2019-08-01 DIAGNOSIS — Z3A.20 20 WEEKS GESTATION OF PREGNANCY: ICD-10-CM

## 2019-08-01 DIAGNOSIS — Z36.86 ENCOUNTER FOR ANTENATAL SCREENING FOR CERVICAL LENGTH: ICD-10-CM

## 2019-08-01 DIAGNOSIS — O28.5 MATERNAL SERUM SCREEN POSITIVE FOR TRISOMY 21: Primary | ICD-10-CM

## 2019-08-01 PROCEDURE — 76811 OB US DETAILED SNGL FETUS: CPT | Performed by: OBSTETRICS & GYNECOLOGY

## 2019-08-01 PROCEDURE — 76817 TRANSVAGINAL US OBSTETRIC: CPT | Performed by: OBSTETRICS & GYNECOLOGY

## 2019-08-01 PROCEDURE — 99212 OFFICE O/P EST SF 10 MIN: CPT | Performed by: OBSTETRICS & GYNECOLOGY

## 2019-08-01 NOTE — PROGRESS NOTES
A transvaginal ultrasound was performed  Sonographer note on use of High Level Disinfection Process (Trophon) for transvaginal probe# 2  used, serial # [id number]     Stephane Nieves RDMS

## 2019-08-01 NOTE — PROGRESS NOTES
The patient was seen today for an ultrasound  Please see ultrasound report (located under Ob Procedures) for additional details  Thank you very much for allowing us to participate in the care of this very nice patient  Should you have any questions, please do not hesitate to contact me  Perfecto Gamble MD [address] [facility]  Attending Physician, Blanca

## 2019-08-27 ENCOUNTER — ROUTINE PRENATAL (OUTPATIENT)
Dept: OBGYN CLINIC | Facility: CLINIC | Age: 34
End: 2019-08-27

## 2019-08-27 VITALS — DIASTOLIC BLOOD PRESSURE: 66 MMHG | BODY MASS INDEX: 31.89 KG/M2 | WEIGHT: 185.8 LBS | SYSTOLIC BLOOD PRESSURE: 106 MMHG

## 2019-08-27 DIAGNOSIS — Z34.82 MULTIGRAVIDA IN SECOND TRIMESTER: Primary | ICD-10-CM

## 2019-08-27 DIAGNOSIS — Z34.01 ENCOUNTER FOR SUPERVISION OF NORMAL FIRST PREGNANCY IN FIRST TRIMESTER: ICD-10-CM

## 2019-08-27 DIAGNOSIS — Z3A.24 24 WEEKS GESTATION OF PREGNANCY: ICD-10-CM

## 2019-08-27 DIAGNOSIS — O28.5 MATERNAL SERUM SCREEN POSITIVE FOR TRISOMY 21: ICD-10-CM

## 2019-08-27 LAB
SL AMB  POCT GLUCOSE, UA: NEGATIVE
SL AMB POCT URINE PROTEIN: 1

## 2019-08-27 PROCEDURE — PNV: Performed by: NURSE PRACTITIONER

## 2019-08-27 NOTE — PROGRESS NOTES
Patient is doing well; no complaints  GFM  28 week lab slip given    Urine protein-+1  Urine glucose-neg

## 2019-08-27 NOTE — ASSESSMENT & PLAN NOTE
Denies OB complaints  Good fetal movement  Denies contractions, cramping, leakage of fluid or vaginal bleeding  Had level 2 ultrasound  It's a girl! Will have 3rd trimester growth scan and missed anatomy  Provided and explained slip for 28 week labs  Already taking daily PO iron  Reviewed  labor precautions, reasons to call, and FKCs  RTO in 4 weeks

## 2019-08-27 NOTE — PROGRESS NOTES
Problem List Items Addressed This Visit        Other    Multigravida in second trimester - Primary     Denies OB complaints  Good fetal movement  Denies contractions, cramping, leakage of fluid or vaginal bleeding  Had level 2 ultrasound  It's a girl! Will have 3rd trimester growth scan and missed anatomy  Provided and explained slip for 28 week labs  Already taking daily PO iron  Reviewed  labor precautions, reasons to call, and FKCs  RTO in 4 weeks             Relevant Orders    RPR    CBC and differential    Glucose, 1H PG    POCT urine dip (Completed)    24 weeks gestation of pregnancy    Maternal serum screen positive for trisomy 21     Normal CVS            Other Visit Diagnoses     Encounter for supervision of normal first pregnancy in first trimester

## 2019-09-23 ENCOUNTER — ROUTINE PRENATAL (OUTPATIENT)
Dept: OBGYN CLINIC | Facility: CLINIC | Age: 34
End: 2019-09-23

## 2019-09-23 VITALS — SYSTOLIC BLOOD PRESSURE: 110 MMHG | DIASTOLIC BLOOD PRESSURE: 58 MMHG | BODY MASS INDEX: 32.3 KG/M2 | WEIGHT: 188.2 LBS

## 2019-09-23 DIAGNOSIS — Z3A.28 28 WEEKS GESTATION OF PREGNANCY: ICD-10-CM

## 2019-09-23 DIAGNOSIS — Z34.82 MULTIGRAVIDA IN SECOND TRIMESTER: Primary | ICD-10-CM

## 2019-09-23 DIAGNOSIS — Z87.440 HISTORY OF UTI: ICD-10-CM

## 2019-09-23 DIAGNOSIS — Z98.891 S/P REPEAT LOW TRANSVERSE C-SECTION: ICD-10-CM

## 2019-09-23 LAB
SL AMB  POCT GLUCOSE, UA: NEGATIVE
SL AMB POCT URINE PROTEIN: ABNORMAL

## 2019-09-23 PROCEDURE — PNV: Performed by: NURSE PRACTITIONER

## 2019-09-23 NOTE — ASSESSMENT & PLAN NOTE
Patient c/o pain with intercourse that she experienced even prior to pregnancy, but now has gotten worse  Patient experiences pain and burning in urethra for up to an hour after intercourse  Places an ice pack there, which helps  Discussed r/o vaginal infection today  SSE performed without abnormal discharge and erythema  Wet mount neg, pH 4 5, neg whiff  Voids right before and right after intercourse  Did have a UTI in beginning of pregnancy that was treated, but no PIPER was sent  Slip provided for urine culture today to complete with 28 week labs this week  Good fetal movement  Denies contractions, cramping, leakage of fluid or vaginal bleeding  Patient plans to complete 28 week labs this week  Already taking daily PO iron  Reviewed  labor precautions, reasons to call, and [facility]  RTO in 2 weeks

## 2019-09-23 NOTE — ASSESSMENT & PLAN NOTE
Had + urine culture earlier in pregnancy and was treated with antibiotics  Patient did not have PIPER done  She is asymptomatic today  Slip provided for urine culture and recommended to complete with 28 week labs this week

## 2019-09-23 NOTE — PROGRESS NOTES
Patient is doing well; no complaints  GFM  Breast pump slip filled out  TDAP next visit  Will go for 28 week labs 25th

## 2019-09-23 NOTE — PROGRESS NOTES
Problem List Items Addressed This Visit        Genitourinary    History of UTI     Had + urine culture earlier in pregnancy and was treated with antibiotics  Patient did not have PIPER done  She is asymptomatic today  Slip provided for urine culture and recommended to complete with 28 week labs this week  Relevant Orders    Urine culture       Other    Multigravida in second trimester - Primary     Patient c/o pain with intercourse that she experienced even prior to pregnancy, but now has gotten worse  Patient experiences pain and burning in urethra for up to an hour after intercourse  Places an ice pack there, which helps  Discussed r/o vaginal infection today  SSE performed without abnormal discharge and erythema  Wet mount neg, pH 4 5, neg whiff  Voids right before and right after intercourse  Did have a UTI in beginning of pregnancy that was treated, but no PIPER was sent  Slip provided for urine culture today to complete with 28 week labs this week  Good fetal movement  Denies contractions, cramping, leakage of fluid or vaginal bleeding  Patient plans to complete 28 week labs this week  Already taking daily PO iron  Reviewed  labor precautions, reasons to call, and FKCs  RTO in 2 weeks  Relevant Orders    POCT urine dip (Completed)    S/P repeat low transverse      Plans for repeat   Has 3rd trimester growth scan scheduled           28 weeks gestation of pregnancy

## 2019-09-26 LAB
BASOPHILS # BLD AUTO: 52 CELLS/UL (ref 0–200)
BASOPHILS NFR BLD AUTO: 0.5 %
EOSINOPHIL # BLD AUTO: 73 CELLS/UL (ref 15–500)
EOSINOPHIL NFR BLD AUTO: 0.7 %
ERYTHROCYTE [DISTWIDTH] IN BLOOD BY AUTOMATED COUNT: 16.6 % (ref 11–15)
GLUCOSE 1H P 50 G GLC PO SERPL-MCNC: 166 MG/DL
HCT VFR BLD AUTO: 28.6 % (ref 35–45)
HGB BLD-MCNC: 8.8 G/DL (ref 11.7–15.5)
LYMPHOCYTES # BLD AUTO: 2007 CELLS/UL (ref 850–3900)
LYMPHOCYTES NFR BLD AUTO: 19.3 %
MCH RBC QN AUTO: 24 PG (ref 27–33)
MCHC RBC AUTO-ENTMCNC: 30.8 G/DL (ref 32–36)
MCV RBC AUTO: 78.1 FL (ref 80–100)
MONOCYTES # BLD AUTO: 489 CELLS/UL (ref 200–950)
MONOCYTES NFR BLD AUTO: 4.7 %
NEUTROPHILS # BLD AUTO: 7779 CELLS/UL (ref 1500–7800)
NEUTROPHILS NFR BLD AUTO: 74.8 %
PLATELET # BLD AUTO: 328 THOUSAND/UL (ref 140–400)
PMV BLD REES-ECKER: 10.2 FL (ref 7.5–12.5)
RBC # BLD AUTO: 3.66 MILLION/UL (ref 3.8–5.1)
RPR SER QL: NORMAL
WBC # BLD AUTO: 10.4 THOUSAND/UL (ref 3.8–10.8)

## 2019-09-27 ENCOUNTER — TELEPHONE (OUTPATIENT)
Dept: OBGYN CLINIC | Facility: CLINIC | Age: 34
End: 2019-09-27

## 2019-09-27 DIAGNOSIS — D64.9 LOW HEMOGLOBIN: Primary | ICD-10-CM

## 2019-09-27 DIAGNOSIS — R73.09 ELEVATED GLUCOSE LEVEL: ICD-10-CM

## 2019-09-27 NOTE — TELEPHONE ENCOUNTER
----- Message from [address] [place] [address] sent at 9/27/2019  8:05 AM EDT -----  Please let patient know that she failed her 1 hour gtt and will need to go for a 3 hour gtt  Her hemoglobin is also much lower at 8 8  Since she is already taking daily PO iron, I would recommend she have a referral to hem/onc to discuss iron infusions  Thank you!

## 2019-09-27 NOTE — TELEPHONE ENCOUNTER
Patient returned call - she is aware of her results and that she needs to have a 3 hr GTT done - explained to her it was fasting and done at 3 locations - [facility], [facility] and [facility]  Also advised her that she was given a referral for hem/onc - will be getting a call with in the next few days for a consult

## 2019-10-03 ENCOUNTER — APPOINTMENT (OUTPATIENT)
Dept: LAB | Facility: HOSPITAL | Age: 34
End: 2019-10-03
Payer: COMMERCIAL

## 2019-10-03 ENCOUNTER — TELEPHONE (OUTPATIENT)
Dept: OBGYN CLINIC | Facility: CLINIC | Age: 34
End: 2019-10-03

## 2019-10-03 DIAGNOSIS — R73.09 ELEVATED GLUCOSE LEVEL: Primary | ICD-10-CM

## 2019-10-03 DIAGNOSIS — R73.09 ELEVATED GLUCOSE LEVEL: ICD-10-CM

## 2019-10-03 DIAGNOSIS — Z34.81 PRENATAL CARE, SUBSEQUENT PREGNANCY, FIRST TRIMESTER: ICD-10-CM

## 2019-10-03 DIAGNOSIS — Z87.440 HISTORY OF UTI: ICD-10-CM

## 2019-10-03 LAB — GLUCOSE P FAST SERPL-MCNC: 106 MG/DL (ref 65–99)

## 2019-10-03 PROCEDURE — 36415 COLL VENOUS BLD VENIPUNCTURE: CPT

## 2019-10-03 PROCEDURE — 82951 GLUCOSE TOLERANCE TEST (GTT): CPT

## 2019-10-03 NOTE — TELEPHONE ENCOUNTER
Patient called - she went to do her 3 hr GTT today 0 failed her fasting glucose - was 106  Send to diabetic pathways?

## 2019-10-03 NOTE — TELEPHONE ENCOUNTER
----- Message from [address] [place] [address] sent at 10/3/2019  4:59 PM EDT -----  Please let patient know that she failed her initial fasting glucose during her 3 hour gtt  She needs to be referred to diabetic pathways  Thank you!

## 2019-10-08 ENCOUNTER — OFFICE VISIT (OUTPATIENT)
Dept: PERINATAL CARE | Facility: CLINIC | Age: 34
End: 2019-10-08
Payer: COMMERCIAL

## 2019-10-08 ENCOUNTER — ROUTINE PRENATAL (OUTPATIENT)
Dept: OBGYN CLINIC | Facility: CLINIC | Age: 34
End: 2019-10-08
Payer: COMMERCIAL

## 2019-10-08 ENCOUNTER — DOCUMENTATION (OUTPATIENT)
Dept: PERINATAL CARE | Facility: CLINIC | Age: 34
End: 2019-10-08

## 2019-10-08 VITALS
HEART RATE: 105 BPM | HEIGHT: 64 IN | DIASTOLIC BLOOD PRESSURE: 68 MMHG | SYSTOLIC BLOOD PRESSURE: 107 MMHG | BODY MASS INDEX: 32.33 KG/M2 | WEIGHT: 189.4 LBS

## 2019-10-08 VITALS — DIASTOLIC BLOOD PRESSURE: 64 MMHG | WEIGHT: 188.4 LBS | BODY MASS INDEX: 32.34 KG/M2 | SYSTOLIC BLOOD PRESSURE: 120 MMHG

## 2019-10-08 DIAGNOSIS — Z23 NEEDS FLU SHOT: ICD-10-CM

## 2019-10-08 DIAGNOSIS — R73.09 ELEVATED GLUCOSE LEVEL: ICD-10-CM

## 2019-10-08 DIAGNOSIS — Z34.82 MULTIGRAVIDA IN SECOND TRIMESTER: ICD-10-CM

## 2019-10-08 DIAGNOSIS — O99.013 ANEMIA AFFECTING PREGNANCY IN THIRD TRIMESTER: Primary | ICD-10-CM

## 2019-10-08 DIAGNOSIS — O24.419 GESTATIONAL DIABETES MELLITUS (GDM) IN THIRD TRIMESTER, GESTATIONAL DIABETES METHOD OF CONTROL UNSPECIFIED: ICD-10-CM

## 2019-10-08 DIAGNOSIS — E66.3 OVERWEIGHT WITH BODY MASS INDEX (BMI) 25.0-29.9: ICD-10-CM

## 2019-10-08 DIAGNOSIS — Z34.83 MULTIGRAVIDA IN THIRD TRIMESTER: ICD-10-CM

## 2019-10-08 DIAGNOSIS — Z98.891 S/P REPEAT LOW TRANSVERSE C-SECTION: ICD-10-CM

## 2019-10-08 DIAGNOSIS — Z3A.30 30 WEEKS GESTATION OF PREGNANCY: ICD-10-CM

## 2019-10-08 DIAGNOSIS — O24.410 DIET CONTROLLED GESTATIONAL DIABETES MELLITUS (GDM) IN THIRD TRIMESTER: Primary | ICD-10-CM

## 2019-10-08 DIAGNOSIS — Z23 NEED FOR TDAP VACCINATION: ICD-10-CM

## 2019-10-08 PROBLEM — Z87.440 HISTORY OF UTI: Status: RESOLVED | Noted: 2019-09-23 | Resolved: 2019-10-08

## 2019-10-08 PROBLEM — Z3A.28 28 WEEKS GESTATION OF PREGNANCY: Status: RESOLVED | Noted: 2019-07-30 | Resolved: 2019-10-08

## 2019-10-08 PROBLEM — L30.9 ECZEMA: Status: ACTIVE | Noted: 2019-10-08

## 2019-10-08 LAB
SL AMB  POCT GLUCOSE, UA: NORMAL
SL AMB POCT URINE PROTEIN: NORMAL

## 2019-10-08 PROCEDURE — 90471 IMMUNIZATION ADMIN: CPT

## 2019-10-08 PROCEDURE — PNV: Performed by: PHYSICIAN ASSISTANT

## 2019-10-08 PROCEDURE — G0108 DIAB MANAGE TRN  PER INDIV: HCPCS | Performed by: DIETITIAN, REGISTERED

## 2019-10-08 PROCEDURE — 90715 TDAP VACCINE 7 YRS/> IM: CPT

## 2019-10-08 RX ORDER — BLOOD SUGAR DIAGNOSTIC
STRIP MISCELLANEOUS
Qty: 100 EACH | Refills: 4 | Status: SHIPPED | OUTPATIENT
Start: 2019-10-08 | End: 2019-12-05 | Stop reason: HOSPADM

## 2019-10-08 RX ORDER — LANCETS 33 GAUGE
EACH MISCELLANEOUS
Qty: 100 EACH | Refills: 3 | Status: SHIPPED | OUTPATIENT
Start: 2019-10-08 | End: 2019-12-13

## 2019-10-08 NOTE — PROGRESS NOTES
Problem List Items Addressed This Visit        Endocrine    Gestational diabetes     Went to diabetic pathways today  To check glucose values qid and report back  Has f/u planned, growth scan planned            Other    Multigravida in third trimester     30 y o  female here for routine PN visit at [address] well overall  Good fetal movement  Says this pregnancy is "kicking her butt" with diabetes and anemia  TDAP today                S/P repeat low transverse      Plan for repeat          Anemia affecting pregnancy in third trimester - Primary     Most recent H&H on  was 8  6  She says that hematology called her and told her they would not do iron infusions unless her hemoglobin was 7 - I told her that was surprising to me  She says she isn't interested in iron infusions  Discussed concern about anemia and potential need for transfusion at delivery  She is now taking Niferex 150mg BID   Will recheck CBC in 2 weeks, she is agreeable to heme referral if not substantially improved by then         Relevant Orders    CBC and differential    CBC and differential      Other Visit Diagnoses     Need for Tdap vaccination        Relevant Orders    TDAP Vaccine greater than or equal to 8yo (Completed)    Needs flu shot

## 2019-10-08 NOTE — ASSESSMENT & PLAN NOTE
30 y o  female here for routine PN visit at [address] [place] [address] well overall  Good fetal movement  Says this pregnancy is "kicking her butt" with diabetes and anemia  TDAP today

## 2019-10-08 NOTE — ASSESSMENT & PLAN NOTE
Most recent H&H on 9/25 was 8 8/28 6  She says that hematology called her and told her they would not do iron infusions unless her hemoglobin was 7 - I told her that was surprising to me  She says she isn't interested in iron infusions  Discussed concern about anemia and potential need for transfusion at delivery  She is now taking Niferex 150mg BID   Will recheck CBC in 2 weeks, she is agreeable to heme referral if not substantially improved by then

## 2019-10-08 NOTE — ASSESSMENT & PLAN NOTE
Went to [facility] today  To check glucose values qid and report back  Has f/u planned, growth scan planned

## 2019-10-08 NOTE — PROGRESS NOTES
10/08/19  Abbie Mendoza   1985  Estimated Date of Delivery: 19   EGA: 30w4d    Dear Drs at [facility]:    Thank you for referring your patient to the Diabetes and Pregnancy Program at [address] [place] [address]  The patient attended class 1 and patient received the following education:     Pathophysiology of diabetes and pregnancy  This includes maternal-fetal complications such as fetal macrosomia,  hypoglycemia, polyhydramnios, increased incidence of  section, pre-term labor and in severe cases, fetal demise and stillbirth   Instruction on diet and glucometer use was provided  Self-monitoring of blood glucose levels: fasting (goal 60mg/dl to 90mg/dl) and two hours after the start of the meal less (goal less than 120mg/dl)  The patient was provided with a One-Touch Verio blood glucose meter and supplies  Blood glucose during demonstration was 109   Medical Nutrition Therapy for diabetes and pregnancy  The patient was provided with a 2100 calorie meal plan and the following was reviewed:     o Basic review of macronutrients   o Meal pattern should consist of three small meals and three snacks daily  Diet recall indicates patient follows similar to a keto diet--eats mostly protein foods & vegetables  Seldom eats starches or fruit  Stressed the importance of eating at a minimum of 135 gm CHO daily  o Carbohydrate gram amounts per meal   o Instructions on how to read a food label  o Appropriate serving sizes for carbohydrates and proteins  o Incorporating protein at each meal and snack  o Maintain a three day food diary and bring to class 2    Report blood glucose levels to the [address] [place], [place] [address] weekly or as directed:  o Phone : [phone]  If no response in 24 hours, call [phone]   o Fax: [phone]  o Email: [email or web address]  The patient is scheduled to attend class 2 on Thursday, 10/17/19    Additionally, fetal ultrasound evaluation by the Perinatologist has been scheduled to assure continuity of care  Please contact the Diabetes and Pregnancy Program at [phone] if you have any questions  Time spent with patient 2:55-3:55 PM; time spent face to face counseling greater than 50% of the appointment      Sincerely,   Ministerio Barker  Diabetes Educator   Diabetes and Pregnancy Program

## 2019-10-08 NOTE — PROGRESS NOTES
Date:  10/08/19  RE: Yudi Virk    : 1985  Estimated Date of Delivery: 19  EGA: 30w4d  OB/GYN: Chandra [facility]  Diet controlled gestational diabetes    Date Fasting Post-  breakfast Post-  lunch Post-  dinner Before bedtime Carbs Comments   10/3/19  96 102 98      10/4/19  98 110 100      10/7/19  87 101 95      10/8/19  90 91         Current regimen:  Regular diet with 3 meals & 3 snacks  Eats mostly protein foods & vegetables; seldom eats starches or fruits  Follows similar to a keto diet  Admits to eating in the middle of the night as has no appetite for dinner  Testing BG 3 times daily with a glucose meter from her job  Plan:  Begin 2100 calorie GDM diet with 3 meals & 3 snacks  Stressed the importance of eating a minimum of 135 gm-175 gm CHO daily  Stressed the importance of eating dinner so as to be full & not eat in middle of the night  Begin monitoring BG 4 times daily including the FBS with a One-Touch Verio glucose meter  Class 2 scheduled for Thursday, 10/17/19      Date due to report next:  Wednesday,10/16/19      Kim Valladares  Diabetes Educator   Diabetes and Pregnancy Program

## 2019-10-09 NOTE — PATIENT INSTRUCTIONS
Thank you for choosing [facility] for your  care today  If you have any questions about your ultrasound or care, please do not hesitate to contact us or your primary obstetrician  Please communicate your blood sugars at least weekly with the diabetic educators at the [address] [place] [address] Diabetes in Pregnancy program   The telephone number is [phone]  The e-mail address is [email or web address]  If you do not hear back from the program within 48 hours of sending your blood sugars, please call ALMA Morrison at [phone]  Thank you

## 2019-10-10 ENCOUNTER — ULTRASOUND (OUTPATIENT)
Dept: PERINATAL CARE | Facility: CLINIC | Age: 34
End: 2019-10-10
Payer: COMMERCIAL

## 2019-10-10 VITALS
HEART RATE: 90 BPM | BODY MASS INDEX: 32.44 KG/M2 | WEIGHT: 190 LBS | SYSTOLIC BLOOD PRESSURE: 101 MMHG | HEIGHT: 64 IN | DIASTOLIC BLOOD PRESSURE: 59 MMHG

## 2019-10-10 DIAGNOSIS — D50.9 IRON DEFICIENCY ANEMIA DURING PREGNANCY: ICD-10-CM

## 2019-10-10 DIAGNOSIS — IMO0002 EVALUATE ANATOMY NOT SEEN ON PRIOR SONOGRAM: ICD-10-CM

## 2019-10-10 DIAGNOSIS — Z36.89 ENCOUNTER FOR ULTRASOUND TO CHECK FETAL GROWTH: ICD-10-CM

## 2019-10-10 DIAGNOSIS — O99.019 IRON DEFICIENCY ANEMIA DURING PREGNANCY: ICD-10-CM

## 2019-10-10 DIAGNOSIS — O24.419 GESTATIONAL DIABETES MELLITUS (GDM) IN THIRD TRIMESTER, GESTATIONAL DIABETES METHOD OF CONTROL UNSPECIFIED: Primary | ICD-10-CM

## 2019-10-10 PROCEDURE — 99212 OFFICE O/P EST SF 10 MIN: CPT | Performed by: OBSTETRICS & GYNECOLOGY

## 2019-10-10 PROCEDURE — 76816 OB US FOLLOW-UP PER FETUS: CPT | Performed by: OBSTETRICS & GYNECOLOGY

## 2019-10-10 NOTE — PROGRESS NOTES
[address]: Ms Tobias Olszewski was seen today at 30w6d for fetal growth and followup missed anatomy ultrasound  See ultrasound report under "OB Procedures" tab  Please don't hesitate to contact our office with any concerns or questions    Chery Burnett MD

## 2019-10-11 ENCOUNTER — TELEPHONE (OUTPATIENT)
Dept: SURGICAL ONCOLOGY | Facility: CLINIC | Age: 34
End: 2019-10-11

## 2019-10-11 NOTE — TELEPHONE ENCOUNTER
New Patient Encounter    New Patient Intake Form   Patient Details:  Manish Villalobos  1985  [id number]    Background Information:  [address] starts by opening a telephone encounter and gathering the following information   Who is calling to schedule? If not self, relationship to patient? self   Referring Provider Lisa Arreguin   What is the diagnosis? Low hemoglobin   When was the diagnosis? 9/2019   Is patient aware of diagnosis? Yes   Reason for visit? NP DX   Have you had any testing done? If so: when, where? Yes   Are records in [employer]? yes   Was the patient told to bring a disk? no   Scheduling Information:   Preferred [place]:  [facility]     Requesting Specific Provider? no   Are there any dates/time the patient cannot be seen? no   Counseling Pre-Screen:  If the patient answers YES to any of the below questions, please route to the appropriate location specific counselor    Have you felt anxious or worried about cancer and the treatment you are receiving? No   Has your diagnosis caused physical, emotional, or financial hardship for you? No   Note: Do not ask the patient about transportation issues/needs  Please notate if the patient brings it up and the counselor will schedule accordingly  Miscellaneous: n/a   After completing the above information, please route to Financial Counselor and the appropriate Nurse Navigator for review

## 2019-10-17 ENCOUNTER — OFFICE VISIT (OUTPATIENT)
Dept: PERINATAL CARE | Facility: CLINIC | Age: 34
End: 2019-10-17
Payer: COMMERCIAL

## 2019-10-17 ENCOUNTER — DOCUMENTATION (OUTPATIENT)
Dept: PERINATAL CARE | Facility: CLINIC | Age: 34
End: 2019-10-17

## 2019-10-17 ENCOUNTER — CONSULT (OUTPATIENT)
Dept: HEMATOLOGY ONCOLOGY | Facility: CLINIC | Age: 34
End: 2019-10-17
Payer: COMMERCIAL

## 2019-10-17 VITALS
SYSTOLIC BLOOD PRESSURE: 122 MMHG | DIASTOLIC BLOOD PRESSURE: 78 MMHG | WEIGHT: 190 LBS | TEMPERATURE: 98.7 F | HEART RATE: 93 BPM | RESPIRATION RATE: 14 BRPM | HEIGHT: 64 IN | BODY MASS INDEX: 32.44 KG/M2 | OXYGEN SATURATION: 99 %

## 2019-10-17 VITALS
DIASTOLIC BLOOD PRESSURE: 70 MMHG | SYSTOLIC BLOOD PRESSURE: 108 MMHG | BODY MASS INDEX: 32.27 KG/M2 | WEIGHT: 189 LBS | HEART RATE: 72 BPM | HEIGHT: 64 IN

## 2019-10-17 DIAGNOSIS — D50.9 IRON DEFICIENCY ANEMIA DURING PREGNANCY: Primary | ICD-10-CM

## 2019-10-17 DIAGNOSIS — O24.410 DIET CONTROLLED GESTATIONAL DIABETES MELLITUS (GDM) IN THIRD TRIMESTER: Primary | ICD-10-CM

## 2019-10-17 DIAGNOSIS — E66.3 OVERWEIGHT WITH BODY MASS INDEX (BMI) 25.0-29.9: ICD-10-CM

## 2019-10-17 DIAGNOSIS — O99.019 IRON DEFICIENCY ANEMIA DURING PREGNANCY: Primary | ICD-10-CM

## 2019-10-17 DIAGNOSIS — O99.019 IRON DEFICIENCY ANEMIA DURING PREGNANCY: ICD-10-CM

## 2019-10-17 DIAGNOSIS — D50.9 IRON DEFICIENCY ANEMIA DURING PREGNANCY: ICD-10-CM

## 2019-10-17 PROCEDURE — G0108 DIAB MANAGE TRN  PER INDIV: HCPCS | Performed by: DIETITIAN, REGISTERED

## 2019-10-17 PROCEDURE — 99244 OFF/OP CNSLTJ NEW/EST MOD 40: CPT | Performed by: INTERNAL MEDICINE

## 2019-10-17 RX ORDER — SODIUM CHLORIDE 9 MG/ML
20 INJECTION, SOLUTION INTRAVENOUS ONCE
Status: CANCELLED | OUTPATIENT
Start: 2019-10-18

## 2019-10-17 NOTE — PROGRESS NOTES
DATE:  10/17/19  RE: Jael Barros    : 1985    MARTINEZ: Estimated Date of Delivery: 19    EGA: 31w6d    Dear Drs  At [facility]:    Thank you for referring your patient to the Diabetes and Pregnancy Program at [address] [place] [address]  The patient attended Class 2 received the following education:    Weight gain during in pregnancy  Based on the patients height of 5' 4" (1 626 m) inches, pre-pregnancy weight of 170 pounds (BMI-29 1), we would recommend a total weight gain of 11-25 pounds for the pregnancy   The patients current weight is 189 pounds, and her weight gain to date is 19 pounds   Medical Nutrition Therapy for diabetes and pregnancy  The patients three day food diary was reviewed and discussed  The patient was instructed on the following:  o Individualized meal plan  Diet recall indicates she eats 2-3 CHO servings (30-45 gm) &eats 4-5 oz protein at a meal  This is her normal eating pattern even before pregnancy  o Use of food diary to maintain a meal plan    o Importance of protein as it relates to blood glucose control   Review of blood glucose log  Reinforcement of blood glucose goals and reporting guidelines   Ultrasounds every four weeks in the [address] to evaluate fetal growth   Exercise Guidelines:   o Walking up to thirty minutes daily can reduce blood glucose levels  Noticed on weekends when she is more active, her BG is lower  o Monitor for greater than four contractions per hour     o The patient has been instructed not to begin physical activity if she has been instructed not to exercise by your office   Sick day guidelines and hypoglycemia with treatment   Post-partum guidelines:  o Completion of a 75 gram glucose tolerance test at 6 weeks post-partum to check for type 2 diabetes  o 20% weight loss and 30 minutes of exercise 5 times per week reduces the risk of type 2 diabetes   Breastfeeding guidelines     Report blood glucose levels to [address] weekly or as directed  Understands she may need bedtime insulin soon  o Phone: [phone]  If no response in 24 hours, call [phone]    o Fax: [phone]  o Email: [email or web address]    Verbally reviewed insulin pen injection process 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

## 2019-10-17 NOTE — PROGRESS NOTES
Date:  10/17/19  RE: Leonor Foster    : 1985  Estimated Date of Delivery: 19  EGA: 31w6d  OB/GYN: [facility]  Diet controlled gestational diabetes          Current regimen:  2100 calorie diet with 3 meals & 3 snacks  Eats mostly protein foods & vegetables; seldom eats starches or fruits  Has now increased to 2-3 CHO servings per meal  Admits to eating in the middle of the night as has no appetite for dinner  Continues to take 1-2 oz protein only in middle of night  Tried that for the past 2 nights & FBS was 90 each day  An altered bedtime snacks did not decrease the FBS  Self-Blood Glucose Monitoring 4 times daily with a One-Touch Verio glucose meter       Plan:  Continue current regimen  May need to begin insulin soon  Verbally reviewed the insulin injection process  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

## 2019-10-17 NOTE — PROGRESS NOTES
Oncology Consult Note  Shu Willoughby 30 y o  female MRN: [id number]  Unit/Bed#:  Encounter: [id number]      Presenting Complaint:  Iron deficiency anemia, 3rd pregnancy, 2 miscarriages, week 39    History of Presenting Illness:  28-year-old  female is here for evaluation for iron deficiency anemia during pregnancy  She had 2 previous pregnancies status post C-sections, 2 miscarriages and this is week 39 of current pregnancy CBC on 09/25/2019    WBC 10 4, hemoglobin 8 8, MCV 78, RDW 16 6, platelets [id number], normal differential    On June 2019 WBC 9 5, hemoglobin 10 3, MCV 78, platelets [id number]    She had been taking iron supplements as well    Denies any headache blurred vision nausea vomiting diarrhea constipation dysuria hematuria melena hematochezia or any vaginal bleeding    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    She was not anemic outside of the pregnancy    No family history of blood disorders        Review of Systems - As stated in the HPI otherwise the fourteen point review of systems was negative      Past Medical History:   Diagnosis Date    Abnormal Pap smear of cervix     HPV (human papilloma virus) infection     Miscarriage 03/2018    Recurrent pregnancy loss, antepartum condition or complication     Varicella        Social History     Socioeconomic History    Marital status: /Civil Union     Spouse name: Josr Sanford    Number of children: 3    Years of education: None    Highest education level: None   Occupational History    Occupation: Registered Nurse     Comment: [employer]   Social Needs    Financial resource strain: None    Food insecurity:     Worry: None     Inability: None    Transportation needs:     Medical: None     Non-medical: None   Tobacco Use    Smoking status: Never Smoker    Smokeless tobacco: Never Used   Substance and Sexual Activity    Alcohol use: No    Drug use: No    Sexual activity: Yes     Partners: Male   Lifestyle    Physical activity:     Days per week: None     Minutes per session: None    Stress: None   Relationships    Social connections:     Talks on phone: None     Gets together: None     Attends Taoism service: None     Active member of club or organization: None     Attends meetings of clubs or organizations: None     Relationship status: None    Intimate partner violence:     Fear of current or ex partner: None     Emotionally abused: None     Physically abused: None     Forced sexual activity: None   Other Topics Concern    None   Social History Narrative    None       Family History   Problem Relation Age of Onset    Stroke Maternal Grandfather     Cancer Maternal Grandfather     Cancer Paternal Grandmother     No Known Problems Mother     Stroke Father     No Known Problems Sister     No Known Problems Brother     No Known Problems Son     No Known Problems Maternal Grandmother     No Known Problems Paternal Grandfather     No Known Problems Son        No Known Allergies      Current Outpatient Medications:     iron polysaccharides (NIFEREX) 150 mg capsule, Take 150 mg by mouth 2 (two) times a day, Disp: , Rfl:     ONETOUCH DELICA LANCETS 72J MISC, Test 4 times daily  Diet controlled gestational daibetes, Disp: 100 each, Rfl: 3    ONETOUCH VERIO test strip, Test 4 times daily  Diet controlled gestational diabetes, Disp: 100 each, Rfl: 4    Prenatal MV-Min-Fe Fum-FA-DHA (PRENATAL 1 PO), Take 1 tablet by mouth daily, Disp: , Rfl:       /78 (BP Location: Left arm, Patient Position: Sitting, Cuff Size: Standard)   Pulse 93   Temp 98 7 °F (37 1 °C)   Resp 14   Ht 5' 4" (1 626 m)   Wt 86 2 kg (190 lb)   LMP 03/08/2019   SpO2 99%   BMI 32 61 kg/m²       General Appearance:    Alert, oriented        Eyes:    PERRL   Ears:    Normal external ear canals, both ears   Nose:   Nares normal, septum midline   Throat:   Mucosa moist  Pharynx without injection      Neck:   Supple       Lungs: Clear to auscultation bilaterally   Chest Wall:    No tenderness or deformity    Heart:    Regular rate and rhythm       Abdomen:     Soft, non-tender, bowel sounds +, no organomegaly           Extremities:   Extremities no cyanosis or edema       Skin:   no rash or icterus  Lymph nodes:   Cervical, supraclavicular, and axillary nodes normal   Neurologic:   CNII-XII intact, normal strength, sensation and reflexes     Throughout               No results found for this or any previous visit (from the past 48 hour(s))  No results found    ECOG :0      Assessment and plan:  Iron deficiency anemia during pregnancy, progressive, in a patient who had previous 2 C sections, she 36 weeks of pregnancy    She had been on oral supplements    Patient to receive Venofer 300 mg IV x4 doses, side effects such as anaphylactic reaction, headache, nausea, vomiting, back pain, phlebitis told he agree to proceed    CBC in 2 weeks to assess response    Will start IV iron in the next 2 days

## 2019-10-18 ENCOUNTER — HOSPITAL ENCOUNTER (OUTPATIENT)
Dept: INFUSION CENTER | Facility: HOSPITAL | Age: 34
Discharge: HOME | End: 2019-10-18
Attending: INTERNAL MEDICINE
Payer: COMMERCIAL

## 2019-10-18 VITALS
HEART RATE: 84 BPM | TEMPERATURE: 98.3 F | DIASTOLIC BLOOD PRESSURE: 62 MMHG | RESPIRATION RATE: 16 BRPM | SYSTOLIC BLOOD PRESSURE: 106 MMHG

## 2019-10-18 DIAGNOSIS — D50.9 IRON DEFICIENCY ANEMIA DURING PREGNANCY: Primary | ICD-10-CM

## 2019-10-18 DIAGNOSIS — O99.019 IRON DEFICIENCY ANEMIA DURING PREGNANCY: Primary | ICD-10-CM

## 2019-10-18 PROCEDURE — 96365 THER/PROPH/DIAG IV INF INIT: CPT

## 2019-10-18 PROCEDURE — 96366 THER/PROPH/DIAG IV INF ADDON: CPT

## 2019-10-18 RX ORDER — SODIUM CHLORIDE 9 MG/ML
20 INJECTION, SOLUTION INTRAVENOUS ONCE
Status: COMPLETED | OUTPATIENT
Start: 2019-10-18 | End: 2019-10-18

## 2019-10-18 RX ORDER — SODIUM CHLORIDE 9 MG/ML
20 INJECTION, SOLUTION INTRAVENOUS ONCE
Status: CANCELLED | OUTPATIENT
Start: 2019-10-23

## 2019-10-18 RX ADMIN — SODIUM CHLORIDE 20 ML/HR: 0.9 INJECTION, SOLUTION INTRAVENOUS at 11:18

## 2019-10-18 RX ADMIN — IRON SUCROSE 300 MG: 20 INJECTION, SOLUTION INTRAVENOUS at 11:17

## 2019-10-22 RX ORDER — SODIUM CHLORIDE 9 MG/ML
20 INJECTION, SOLUTION INTRAVENOUS ONCE
Status: CANCELLED | OUTPATIENT
Start: 2019-10-23

## 2019-10-23 ENCOUNTER — DOCUMENTATION (OUTPATIENT)
Dept: PERINATAL CARE | Facility: CLINIC | Age: 34
End: 2019-10-23

## 2019-10-23 ENCOUNTER — HOSPITAL ENCOUNTER (OUTPATIENT)
Dept: INFUSION CENTER | Facility: HOSPITAL | Age: 34
Discharge: HOME | End: 2019-10-23
Attending: INTERNAL MEDICINE
Payer: COMMERCIAL

## 2019-10-23 VITALS
HEART RATE: 80 BPM | TEMPERATURE: 98.3 F | RESPIRATION RATE: 18 BRPM | DIASTOLIC BLOOD PRESSURE: 64 MMHG | SYSTOLIC BLOOD PRESSURE: 104 MMHG

## 2019-10-23 DIAGNOSIS — O99.019 IRON DEFICIENCY ANEMIA DURING PREGNANCY: Primary | ICD-10-CM

## 2019-10-23 DIAGNOSIS — D50.9 IRON DEFICIENCY ANEMIA DURING PREGNANCY: Primary | ICD-10-CM

## 2019-10-23 DIAGNOSIS — O24.414 INSULIN CONTROLLED GESTATIONAL DIABETES MELLITUS (GDM) IN THIRD TRIMESTER: Primary | ICD-10-CM

## 2019-10-23 PROCEDURE — 96365 THER/PROPH/DIAG IV INF INIT: CPT

## 2019-10-23 RX ORDER — SODIUM CHLORIDE 9 MG/ML
20 INJECTION, SOLUTION INTRAVENOUS ONCE
Status: COMPLETED | OUTPATIENT
Start: 2019-10-23 | End: 2019-10-23

## 2019-10-23 RX ORDER — BLOOD SUGAR DIAGNOSTIC
STRIP MISCELLANEOUS
Qty: 100 EACH | Refills: 0 | Status: CANCELLED | OUTPATIENT
Start: 2019-10-23

## 2019-10-23 RX ORDER — SODIUM CHLORIDE 9 MG/ML
20 INJECTION, SOLUTION INTRAVENOUS ONCE
Status: CANCELLED | OUTPATIENT
Start: 2019-10-30

## 2019-10-23 RX ORDER — INSULIN GLARGINE 100 [IU]/ML
INJECTION, SOLUTION SUBCUTANEOUS
Qty: 15 ML | Refills: 0 | Status: SHIPPED | OUTPATIENT
Start: 2019-10-23 | End: 2019-10-25 | Stop reason: CLARIF

## 2019-10-23 RX ADMIN — IRON SUCROSE 300 MG: 20 INJECTION, SOLUTION INTRAVENOUS at 13:16

## 2019-10-23 RX ADMIN — SODIUM CHLORIDE 20 ML/HR: 0.9 INJECTION, SOLUTION INTRAVENOUS at 13:15

## 2019-10-23 NOTE — TELEPHONE ENCOUNTER
Pt called and advised to start Basal insulin, Lantus 25 units at 9:00 pm daily  Orders placed for insulin pens and needles and sent to [facility] on file  Advised to test blood sugar at 3 am morning following insulin start  Pt to report blood sugars again on Monday, 10/28

## 2019-10-23 NOTE — PROGRESS NOTES
Date:  10/23/19  RE: Rhiannon Olguincal    : 1985  Estimated Date of Delivery: 19  EGA: 32w5d  OB/GYN: Tanvir Reid  Diet controlled gestational diabetes          Current regimen:  2100 calorie diet with 3 meals & 3 snacks  Eats mostly protein foods & vegetables; seldom eats starches or fruits  Has now increased to 2-3 CHO servings per meal  Admits to eating in the middle of the night as has no appetite for dinner  Continues to take 1-2 oz protein only in middle of night  Tried that for the past 2 nights & FBS was 90 each day  An altered bedtime snacks did not decrease the FBS  Self-Blood Glucose Monitoring 4 times daily with a One-Touch Verio glucose meter       Plan:  Fasting glucose values > 90 mg/dL  All PP values WDL  Recommend basal insulin start  Lantus/Levemir 25 units at 9:00 or 10:00 pm daily  Continue current regimen  Pt previously was provided with verbal insulin injection education  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

## 2019-10-23 NOTE — PLAN OF CARE
Problem: Potential for Falls  Goal: Patient will remain free of falls  Description  INTERVENTIONS:  - Assess patient frequently for physical needs  -  Identify cognitive and physical deficits and behaviors that affect risk of falls    -  [place] fall precautions as indicated by assessment   - Educate patient/family on patient safety including physical limitations  - Instruct patient to call for assistance with activity based on assessment  - Modify environment to reduce risk of injury  - Consider OT/PT consult to assist with strengthening/mobility  Outcome: Progressing

## 2019-10-24 ENCOUNTER — ROUTINE PRENATAL (OUTPATIENT)
Dept: OBGYN CLINIC | Facility: CLINIC | Age: 34
End: 2019-10-24

## 2019-10-24 VITALS — DIASTOLIC BLOOD PRESSURE: 62 MMHG | SYSTOLIC BLOOD PRESSURE: 112 MMHG | BODY MASS INDEX: 32.99 KG/M2 | WEIGHT: 192.2 LBS

## 2019-10-24 DIAGNOSIS — O24.414 INSULIN CONTROLLED GESTATIONAL DIABETES MELLITUS (GDM) IN THIRD TRIMESTER: ICD-10-CM

## 2019-10-24 DIAGNOSIS — Z34.83 MULTIGRAVIDA IN THIRD TRIMESTER: Primary | ICD-10-CM

## 2019-10-24 DIAGNOSIS — O99.013 ANEMIA AFFECTING PREGNANCY IN THIRD TRIMESTER: ICD-10-CM

## 2019-10-24 DIAGNOSIS — Z98.891 S/P REPEAT LOW TRANSVERSE C-SECTION: ICD-10-CM

## 2019-10-24 DIAGNOSIS — Z34.83 PRENATAL CARE, SUBSEQUENT PREGNANCY, THIRD TRIMESTER: ICD-10-CM

## 2019-10-24 LAB
SL AMB  POCT GLUCOSE, UA: NORMAL
SL AMB POCT URINE PROTEIN: NORMAL

## 2019-10-24 PROCEDURE — PNV: Performed by: NURSE PRACTITIONER

## 2019-10-24 NOTE — PROGRESS NOTES
Problem List Items Addressed This Visit        Endocrine    Gestational diabetes     Has been following with diabetic pathways and was recommended yesterday to start insulin at bedtime d/t elevated fasting glucose levels  Patient is working to find a cheaper insulin because the original pen that was ordered was $200 for 2 pens  She has been in contact with diabetic pathways  Had growth scan last week and has repeat growth scan in 3 weeks  Discussed  testing beginning at 42 weeks  Other    Multigravida in third trimester - Primary     Denies OB complaints  Good fetal movement  Denies contractions, cramping, leakage of fluid or vaginal bleeding  S/p Tdap/flu vaccines  Received flu vaccine at work 10/14/19  Reviewed  labor precautions, reasons to call, and FK  RTO in 2 weeks  S/P repeat low transverse       not scheduled yet  Plans for repeat  at 39 weeks on 19  Will send message to office staff to facilitate scheduling this  Anemia affecting pregnancy in third trimester     Started Iron infusions last week  Has had 2 so far and repeat blood work has been ordered by Hematology              Other Visit Diagnoses     Prenatal care, subsequent pregnancy, third trimester        Relevant Orders    POCT urine dip (Completed)

## 2019-10-24 NOTE — ASSESSMENT & PLAN NOTE
Denies OB complaints  Good fetal movement  Denies contractions, cramping, leakage of fluid or vaginal bleeding  S/p Tdap/flu vaccines  Received flu vaccine at work 10/14/19  Reviewed  labor precautions, reasons to call, and FKCs  RTO in 2 weeks

## 2019-10-24 NOTE — ASSESSMENT & PLAN NOTE
Started Iron infusions last week  Has had 2 so far and repeat blood work has been ordered by Hematology

## 2019-10-24 NOTE — PROGRESS NOTES
Flu shot given last week at work  Lot# [id number] Exp: 6/30/20 [facility]  Check in given  Has RX for Breast pump

## 2019-10-24 NOTE — ASSESSMENT & PLAN NOTE
not scheduled yet  Plans for repeat  at 39 weeks on 19  Will send message to office staff to facilitate scheduling this

## 2019-10-24 NOTE — ASSESSMENT & PLAN NOTE
Has been following with diabetic pathways and was recommended yesterday to start insulin at bedtime d/t elevated fasting glucose levels  Patient is working to find a cheaper insulin because the original pen that was ordered was $200 for 2 pens  She has been in contact with diabetic pathways  Had growth scan last week and has repeat growth scan in 3 weeks  Discussed  testing beginning at 42 weeks

## 2019-10-25 DIAGNOSIS — O24.414 INSULIN CONTROLLED GESTATIONAL DIABETES MELLITUS (GDM) IN THIRD TRIMESTER: Primary | ICD-10-CM

## 2019-10-29 ENCOUNTER — TELEPHONE (OUTPATIENT)
Dept: OBGYN CLINIC | Age: 34
End: 2019-10-29

## 2019-10-29 NOTE — TELEPHONE ENCOUNTER
L & D called - Repeat C/S scheduled for 12/6/19 at 1000 ; Per KR request  ( Pt will be 39 weeks)  Per pts' communication consent form, L/M informing  Pt  advised to call with any further questions/concerns  Routed to provider on-call that day, Dr Rachel Peña

## 2019-10-29 NOTE — TELEPHONE ENCOUNTER
----- Message from ALMA Morrell sent at 10/25/2019 10:34 AM EDT -----  Regarding: schedule   Hi Margo Ambriz! Tori Leal is 33w and needs repeat  scheduled at 43 weeks  This will be on   Thank you!   Estefania Gutierrez

## 2019-10-30 ENCOUNTER — HOSPITAL ENCOUNTER (OUTPATIENT)
Dept: INFUSION CENTER | Facility: HOSPITAL | Age: 34
Discharge: HOME | End: 2019-10-30
Attending: INTERNAL MEDICINE
Payer: COMMERCIAL

## 2019-10-30 VITALS
SYSTOLIC BLOOD PRESSURE: 110 MMHG | HEART RATE: 83 BPM | DIASTOLIC BLOOD PRESSURE: 55 MMHG | RESPIRATION RATE: 18 BRPM | TEMPERATURE: 98.1 F

## 2019-10-30 DIAGNOSIS — D50.9 IRON DEFICIENCY ANEMIA DURING PREGNANCY: Primary | ICD-10-CM

## 2019-10-30 DIAGNOSIS — O99.019 IRON DEFICIENCY ANEMIA DURING PREGNANCY: Primary | ICD-10-CM

## 2019-10-30 PROCEDURE — 96365 THER/PROPH/DIAG IV INF INIT: CPT

## 2019-10-30 PROCEDURE — 96366 THER/PROPH/DIAG IV INF ADDON: CPT

## 2019-10-30 RX ORDER — SODIUM CHLORIDE 9 MG/ML
20 INJECTION, SOLUTION INTRAVENOUS ONCE
Status: CANCELLED | OUTPATIENT
Start: 2019-11-06

## 2019-10-30 RX ORDER — SODIUM CHLORIDE 9 MG/ML
20 INJECTION, SOLUTION INTRAVENOUS ONCE
Status: COMPLETED | OUTPATIENT
Start: 2019-10-30 | End: 2019-10-30

## 2019-10-30 RX ADMIN — IRON SUCROSE 300 MG: 20 INJECTION, SOLUTION INTRAVENOUS at 13:42

## 2019-10-30 RX ADMIN — SODIUM CHLORIDE 20 ML/HR: 0.9 INJECTION, SOLUTION INTRAVENOUS at 13:42

## 2019-10-30 NOTE — PLAN OF CARE
Problem: Potential for Falls  Goal: Patient will remain free of falls  Description  INTERVENTIONS:  - Assess patient frequently for physical needs  -  Identify cognitive and physical deficits and behaviors that affect risk of falls    -  [place] fall precautions as indicated by assessment   - Educate patient/family on patient safety including physical limitations  - Instruct patient to call for assistance with activity based on assessment  - Modify environment to reduce risk of injury  - Consider OT/PT consult to assist with strengthening/mobility  Outcome: Progressing

## 2019-11-04 ENCOUNTER — TELEPHONE (OUTPATIENT)
Dept: OBGYN CLINIC | Facility: CLINIC | Age: 34
End: 2019-11-04

## 2019-11-04 NOTE — TELEPHONE ENCOUNTER
----- Message from Marylene Frankel, MD sent at 11/4/2019  2:29 PM EST -----  This csection is coming up on my schedule but it should be Dr Lidia Ortega - she is on 12/6/19

## 2019-11-05 ENCOUNTER — DOCUMENTATION (OUTPATIENT)
Dept: PERINATAL CARE | Facility: CLINIC | Age: 34
End: 2019-11-05

## 2019-11-05 ENCOUNTER — ROUTINE PRENATAL (OUTPATIENT)
Dept: OBGYN CLINIC | Facility: CLINIC | Age: 34
End: 2019-11-05

## 2019-11-05 VITALS — SYSTOLIC BLOOD PRESSURE: 110 MMHG | BODY MASS INDEX: 33.47 KG/M2 | WEIGHT: 195 LBS | DIASTOLIC BLOOD PRESSURE: 62 MMHG

## 2019-11-05 DIAGNOSIS — Z34.83 PRENATAL CARE, SUBSEQUENT PREGNANCY, THIRD TRIMESTER: Primary | ICD-10-CM

## 2019-11-05 DIAGNOSIS — Z34.83 MULTIGRAVIDA IN THIRD TRIMESTER: ICD-10-CM

## 2019-11-05 DIAGNOSIS — O24.414 INSULIN CONTROLLED GESTATIONAL DIABETES MELLITUS (GDM) IN THIRD TRIMESTER: ICD-10-CM

## 2019-11-05 LAB
SL AMB  POCT GLUCOSE, UA: NEGATIVE
SL AMB POCT URINE PROTEIN: NEGATIVE

## 2019-11-05 PROCEDURE — PNV: Performed by: PHYSICIAN ASSISTANT

## 2019-11-05 NOTE — PROGRESS NOTES
Date:  19  RE: Walt Hernández    : 1985  Estimated Date of Delivery: 19  EGA: 34w4d  OB/GYN: Mitch Em  Diet controlled gestational diabetes    10 28 Fastin After breakfast 109 After lunch 111 After dinner 95  10 29 Fasting 90 After breakfast 119 After lunch 120 After dinner 91  10 30 Fasting 89 After breakfast 102 After lunch 98 After dinner 85  10 31 83 120 104  11 1 89 101 103  11 2 88 118 101 103  11 3 95 119 114 98  11 4 86    With 25 units Lantus q hs    BG results copied from patient's e-mail  ;    Current regimen:  Lantus--25 units at 9-10 Pm  2100 calorie diet with 3 meals & 3 snacks  Eats mostly protein foods & vegetables; seldom eats starches or fruits  Has now increased to 2-3 CHO servings per meal  Admits to eating in the middle of the night as has no appetite for dinner  Continues to take 1-2 oz protein only in middle of night  Self-Blood Glucose Monitoring 4 times daily with a One-Touch Verio glucose meter       Plan:  Continue current regimen  Advised patient she can return to her original bedtime snack  Encouraged patient to complete HbA1c & CMP    10/10/19 ultrasound normal growth & fluids, but AC: 90% & EFW-74%    Date due to report next:  Monday,19    Fozia Felder RD, JUDYN  Diabetes Educator   Diabetes [facility] Pregnancy Program

## 2019-11-05 NOTE — PROGRESS NOTES
Problem List Items Addressed This Visit        Endocrine    Gestational diabetes     Maintained on insulin, glucose values well controlled  To start APFS next visit            Other    Multigravida in third trimester     30 y o  female here for routine PN visit at [facility] well overall  Good fetal movement  It's a girl - Norma Fisher  Has breast pump  Received flu shot, TDAP  GBS next visit  Repeat  scheduled               Other Visit Diagnoses     Prenatal care, subsequent pregnancy, third trimester    -  Primary    Relevant Orders    POCT urine dip (Completed)

## 2019-11-05 NOTE — ASSESSMENT & PLAN NOTE
30 y o  female here for routine PN visit at [address] well overall  Good fetal movement  It's a girl - Bandar Armas  Has breast pump  Received flu shot, TDAP  GBS next visit  Repeat  scheduled

## 2019-11-06 ENCOUNTER — HOSPITAL ENCOUNTER (OUTPATIENT)
Dept: INFUSION CENTER | Facility: HOSPITAL | Age: 34
Discharge: HOME | End: 2019-11-06
Attending: INTERNAL MEDICINE
Payer: COMMERCIAL

## 2019-11-06 VITALS
HEART RATE: 81 BPM | TEMPERATURE: 98.5 F | SYSTOLIC BLOOD PRESSURE: 122 MMHG | DIASTOLIC BLOOD PRESSURE: 62 MMHG | RESPIRATION RATE: 18 BRPM

## 2019-11-06 DIAGNOSIS — D50.9 IRON DEFICIENCY ANEMIA DURING PREGNANCY: Primary | ICD-10-CM

## 2019-11-06 DIAGNOSIS — O99.019 IRON DEFICIENCY ANEMIA DURING PREGNANCY: Primary | ICD-10-CM

## 2019-11-06 PROCEDURE — 96365 THER/PROPH/DIAG IV INF INIT: CPT

## 2019-11-06 PROCEDURE — 96366 THER/PROPH/DIAG IV INF ADDON: CPT

## 2019-11-06 RX ORDER — SODIUM CHLORIDE 9 MG/ML
20 INJECTION, SOLUTION INTRAVENOUS ONCE
Status: CANCELLED | OUTPATIENT
Start: 2019-11-06

## 2019-11-06 RX ORDER — SODIUM CHLORIDE 9 MG/ML
20 INJECTION, SOLUTION INTRAVENOUS ONCE
Status: COMPLETED | OUTPATIENT
Start: 2019-11-06 | End: 2019-11-06

## 2019-11-06 RX ADMIN — SODIUM CHLORIDE 20 ML/HR: 0.9 INJECTION, SOLUTION INTRAVENOUS at 13:22

## 2019-11-06 RX ADMIN — IRON SUCROSE 300 MG: 20 INJECTION, SOLUTION INTRAVENOUS at 13:22

## 2019-11-06 NOTE — PLAN OF CARE
Problem: Potential for Falls  Goal: Patient will remain free of falls  Description  INTERVENTIONS:  - Assess patient frequently for physical needs  -  Identify cognitive and physical deficits and behaviors that affect risk of falls    -  [place] fall precautions as indicated by assessment   - Educate patient/family on patient safety including physical limitations  - Instruct patient to call for assistance with activity based on assessment  - Modify environment to reduce risk of injury  - Consider OT/PT consult to assist with strengthening/mobility  Outcome: Progressing

## 2019-11-13 ENCOUNTER — ULTRASOUND (OUTPATIENT)
Dept: PERINATAL CARE | Facility: CLINIC | Age: 34
End: 2019-11-13
Payer: COMMERCIAL

## 2019-11-13 VITALS
SYSTOLIC BLOOD PRESSURE: 126 MMHG | BODY MASS INDEX: 33.29 KG/M2 | WEIGHT: 195 LBS | HEART RATE: 92 BPM | DIASTOLIC BLOOD PRESSURE: 77 MMHG | HEIGHT: 64 IN

## 2019-11-13 DIAGNOSIS — O24.414 INSULIN CONTROLLED GESTATIONAL DIABETES MELLITUS (GDM) IN THIRD TRIMESTER: Primary | ICD-10-CM

## 2019-11-13 DIAGNOSIS — Z3A.35 35 WEEKS GESTATION OF PREGNANCY: ICD-10-CM

## 2019-11-13 PROCEDURE — 76816 OB US FOLLOW-UP PER FETUS: CPT | Performed by: OBSTETRICS & GYNECOLOGY

## 2019-11-13 PROCEDURE — 99212 OFFICE O/P EST SF 10 MIN: CPT | Performed by: OBSTETRICS & GYNECOLOGY

## 2019-11-13 NOTE — PATIENT INSTRUCTIONS
Kick Counts in Pregnancy   WHAT YOU NEED TO KNOW:   Kick counts measure how much your baby is moving in your womb  A kick from your baby can be felt as a twist, turn, swish, roll, or jab  It is common to feel your baby kicking at 26 to 28 weeks of pregnancy  You may feel your baby kick as early as 20 weeks of pregnancy  DISCHARGE INSTRUCTIONS:   Return to the emergency department if:   · You feel your baby kick less as the day goes on      · You do not feel any kicks in a day  Contact your healthcare provider if:   · You feel a change in the number of kicks or movements of your baby  · You feel fewer than 10 kicks within 2 hours after counting twice  · You have questions or concerns about your baby's movements  Why measure kick counts:  Your baby's movement may provide information about your baby's health  He may move less, or not at all, if there are problems  He may move less if he does not have enough room to grow in your uterus (womb)  He may also move less if he is not getting enough oxygen or nutrition from the placenta  Tell your healthcare provider as soon as you feel a change in your baby's movements  Problems that are found earlier are easier to treat  When to measure kick counts:   · Measure kick counts at the same time every day  · Measure kick counts when your baby is awake and most active  Your baby may be most active in the evening  · Measure kick counts after a meal or snack  Your baby may be more active after you eat  Wait 2 hours after you drink liquids that contain caffeine  Caffeine can make your baby more active than usual     · You should not smoke while you are pregnant  Smoking increases the risk of health problems for you and for your baby during your pregnancy  If you do smoke, wait 2 hours to measure kick counts  Nicotine can make your baby more active than usual   How to measure kick counts:  Check that your baby is awake before you measure kick counts   You can wake up your baby by lightly pushing on your belly, walking, or drinking something cold  Your healthcare provider may tell you different ways to measure kick counts  He may tell you to do the following:  · Use a chart or clock to keep track of the time you start and finish counting  · Sit in a chair or lie on your left side  · Place your hands on the largest part of your belly  · Count until you reach 10 kicks  Write down how much time it takes to count 10 kicks  · It may take 30 minutes to 2 hours to count 10 kicks  It should not take more than 2 hours to count 10 kicks  · If you do not feel 10 kicks within 2 hours, wait 1 hour and count again  Your baby can sleep for up to 40 minutes at one time  Follow up with your healthcare provider as directed:  Write down your questions so you remember to ask them during your visits  © 2017 [facility] Information is for End User's use only and may not be sold, redistributed or otherwise used for commercial purposes  All illustrations and images included in CareNotes® are the copyrighted property of [employer]  or Emmanuel Higgins  The above information is an  only  It is not intended as medical advice for individual conditions or treatments  Talk to your doctor, nurse or pharmacist before following any medical regimen to see if it is safe and effective for you

## 2019-11-13 NOTE — PROGRESS NOTES
Please refer to the [facility] ultrasound report in Ob Procedures for additional information regarding the visit to the [facility]  today

## 2019-11-13 NOTE — LETTER
November 13, 2019     Maia Irizarry DO  [address]    Patient: Demarcus Frost   YOB: 1985   Date of Visit: 11/13/2019       Dear Dr Chaz Eric: Thank you for referring Marianne Basurto to me for evaluation  Below are my notes for this consultation  If you have questions, please do not hesitate to call me  I look forward to following your patient along with you  Sincerely,        Michael Wallis MD        CC: No Recipients  Michael Wallis MD  11/13/2019  2:00 PM  Sign at close encounter  Please refer to the [facility] ultrasound report in Ob Procedures for additional information regarding the visit to the [facility]  today

## 2019-11-14 ENCOUNTER — ROUTINE PRENATAL (OUTPATIENT)
Dept: PERINATAL CARE | Facility: CLINIC | Age: 34
End: 2019-11-14
Payer: COMMERCIAL

## 2019-11-14 VITALS
SYSTOLIC BLOOD PRESSURE: 138 MMHG | WEIGHT: 197.2 LBS | BODY MASS INDEX: 33.67 KG/M2 | HEIGHT: 64 IN | HEART RATE: 102 BPM | DIASTOLIC BLOOD PRESSURE: 77 MMHG

## 2019-11-14 DIAGNOSIS — O24.414 INSULIN CONTROLLED GESTATIONAL DIABETES MELLITUS (GDM) IN THIRD TRIMESTER: ICD-10-CM

## 2019-11-14 DIAGNOSIS — Z3A.36 36 WEEKS GESTATION OF PREGNANCY: Primary | ICD-10-CM

## 2019-11-14 PROCEDURE — 59025 FETAL NON-STRESS TEST: CPT | Performed by: OBSTETRICS & GYNECOLOGY

## 2019-11-14 NOTE — PATIENT INSTRUCTIONS

## 2019-11-14 NOTE — LETTER
NST sleeve cover sheet    Patient name: Renu Jin  : 1985  MRN: [id number]    MARTINEZ: Estimated Date of Delivery: 19    Obstetrician: _____SLOGA__________________________    Reason(s) for testing: GDM  __________________________________________      Testing frequency:    _X__ 2x/wk  ___ 1x/wk  ___ Dopplers  ___ BPP?       Last growth scan: __________________________________________

## 2019-11-19 ENCOUNTER — ROUTINE PRENATAL (OUTPATIENT)
Dept: OBGYN CLINIC | Facility: CLINIC | Age: 34
End: 2019-11-19

## 2019-11-19 VITALS — DIASTOLIC BLOOD PRESSURE: 62 MMHG | BODY MASS INDEX: 33.81 KG/M2 | WEIGHT: 197 LBS | SYSTOLIC BLOOD PRESSURE: 108 MMHG

## 2019-11-19 DIAGNOSIS — O99.019 IRON DEFICIENCY ANEMIA DURING PREGNANCY: ICD-10-CM

## 2019-11-19 DIAGNOSIS — Z34.83 PRENATAL CARE, SUBSEQUENT PREGNANCY, THIRD TRIMESTER: ICD-10-CM

## 2019-11-19 DIAGNOSIS — D50.9 IRON DEFICIENCY ANEMIA DURING PREGNANCY: ICD-10-CM

## 2019-11-19 DIAGNOSIS — Z98.891 S/P REPEAT LOW TRANSVERSE C-SECTION: ICD-10-CM

## 2019-11-19 DIAGNOSIS — Z34.83 MULTIGRAVIDA IN THIRD TRIMESTER: Primary | ICD-10-CM

## 2019-11-19 DIAGNOSIS — O24.414 INSULIN CONTROLLED GESTATIONAL DIABETES MELLITUS (GDM) IN THIRD TRIMESTER: ICD-10-CM

## 2019-11-19 LAB
SL AMB  POCT GLUCOSE, UA: NORMAL
SL AMB POCT URINE PROTEIN: NORMAL

## 2019-11-19 PROCEDURE — 87653 STREP B DNA AMP PROBE: CPT | Performed by: NURSE PRACTITIONER

## 2019-11-19 PROCEDURE — PNV: Performed by: NURSE PRACTITIONER

## 2019-11-19 NOTE — PROGRESS NOTES
Problem List Items Addressed This Visit        Endocrine    Insulin controlled gestational diabetes mellitus (GDM) in third trimester     Has weekly  testing scheduled with [facility]  Blood sugars are well-controlled on 38 units of Lantus at bedtime  Continues to follow-up with diabetic pathways  Other    Multigravida in third trimester - Primary     Denies OB complaints  Good fetal movement  Denies contractions, cramping, leakage of fluid or vaginal bleeding  GBS collected today  S/p flu/Tdap vaccines  Reviewed  labor precautions, reasons to call, and FKCs  RTO in 1 week  S/P repeat low transverse       scheduled on 19  Will need to be given Hibiclens at next prenatal visit  Iron deficiency anemia during pregnancy     Completed iron infusions 2 weeks ago  Slip provided for CBC today  Plans to have drawn tomorrow with CMP/Hgb A1c ordered by M           Relevant Orders    CBC and differential      Other Visit Diagnoses     Prenatal care, subsequent pregnancy, third trimester        Relevant Orders    POCT urine dip (Completed)    Strep B DNA probe, amplification

## 2019-11-19 NOTE — ASSESSMENT & PLAN NOTE
Denies OB complaints  Good fetal movement  Denies contractions, cramping, leakage of fluid or vaginal bleeding  GBS collected today  S/p flu/Tdap vaccines  Reviewed  labor precautions, reasons to call, and FKCs  RTO in 1 week

## 2019-11-19 NOTE — ASSESSMENT & PLAN NOTE
Has weekly  testing scheduled with MFM  Blood sugars are well-controlled on 38 units of Lantus at bedtime  Continues to follow-up with diabetic pathways

## 2019-11-20 ENCOUNTER — ULTRASOUND (OUTPATIENT)
Dept: PERINATAL CARE | Facility: CLINIC | Age: 34
End: 2019-11-20
Payer: COMMERCIAL

## 2019-11-20 VITALS
SYSTOLIC BLOOD PRESSURE: 138 MMHG | WEIGHT: 197.8 LBS | DIASTOLIC BLOOD PRESSURE: 73 MMHG | BODY MASS INDEX: 33.77 KG/M2 | HEIGHT: 64 IN | HEART RATE: 81 BPM

## 2019-11-20 DIAGNOSIS — O24.414 INSULIN CONTROLLED GESTATIONAL DIABETES MELLITUS (GDM) IN THIRD TRIMESTER: Primary | ICD-10-CM

## 2019-11-20 DIAGNOSIS — Z3A.36 36 WEEKS GESTATION OF PREGNANCY: ICD-10-CM

## 2019-11-20 LAB — GP B STREP DNA SPEC QL NAA+PROBE: NORMAL

## 2019-11-20 PROCEDURE — 59025 FETAL NON-STRESS TEST: CPT | Performed by: OBSTETRICS & GYNECOLOGY

## 2019-11-20 NOTE — PROGRESS NOTES
[address]: Ms Varun Wei was seen today at 36w5d for NST (found under the pregnancy episode) which I reviewed the RN assessment and agree  Please don't hesitate to contact our office with any concerns or questions    Patt Walker MD

## 2019-11-20 NOTE — PATIENT INSTRUCTIONS

## 2019-11-21 ENCOUNTER — TELEPHONE (OUTPATIENT)
Dept: OBGYN CLINIC | Facility: CLINIC | Age: 34
End: 2019-11-21

## 2019-11-21 NOTE — TELEPHONE ENCOUNTER
----- Message from [address] [place] [address] sent at 11/21/2019  8:00 AM EST -----  Please notify patient GBS is negative  Thank you!

## 2019-11-22 ENCOUNTER — ULTRASOUND (OUTPATIENT)
Dept: PERINATAL CARE | Facility: CLINIC | Age: 34
End: 2019-11-22
Payer: COMMERCIAL

## 2019-11-22 VITALS
BODY MASS INDEX: 33.8 KG/M2 | HEART RATE: 88 BPM | SYSTOLIC BLOOD PRESSURE: 118 MMHG | WEIGHT: 198 LBS | HEIGHT: 64 IN | DIASTOLIC BLOOD PRESSURE: 75 MMHG

## 2019-11-22 DIAGNOSIS — O24.414 INSULIN CONTROLLED GESTATIONAL DIABETES MELLITUS (GDM) IN THIRD TRIMESTER: Primary | ICD-10-CM

## 2019-11-22 DIAGNOSIS — O24.419 GESTATIONAL DIABETES MELLITUS (GDM) AFFECTING PREGNANCY: ICD-10-CM

## 2019-11-22 DIAGNOSIS — Z3A.37 37 WEEKS GESTATION OF PREGNANCY: ICD-10-CM

## 2019-11-22 PROCEDURE — 59025 FETAL NON-STRESS TEST: CPT | Performed by: OBSTETRICS & GYNECOLOGY

## 2019-11-22 PROCEDURE — 76815 OB US LIMITED FETUS(S): CPT | Performed by: OBSTETRICS & GYNECOLOGY

## 2019-11-25 ENCOUNTER — ULTRASOUND (OUTPATIENT)
Dept: PERINATAL CARE | Facility: CLINIC | Age: 34
End: 2019-11-25
Payer: COMMERCIAL

## 2019-11-25 VITALS
BODY MASS INDEX: 34.15 KG/M2 | DIASTOLIC BLOOD PRESSURE: 74 MMHG | WEIGHT: 200 LBS | HEART RATE: 88 BPM | HEIGHT: 64 IN | SYSTOLIC BLOOD PRESSURE: 110 MMHG

## 2019-11-25 DIAGNOSIS — O24.414 INSULIN CONTROLLED GESTATIONAL DIABETES MELLITUS (GDM) IN THIRD TRIMESTER: Primary | ICD-10-CM

## 2019-11-25 DIAGNOSIS — Z3A.37 37 WEEKS GESTATION OF PREGNANCY: ICD-10-CM

## 2019-11-25 PROCEDURE — 59025 FETAL NON-STRESS TEST: CPT | Performed by: OBSTETRICS & GYNECOLOGY

## 2019-11-25 PROCEDURE — 76815 OB US LIMITED FETUS(S): CPT | Performed by: OBSTETRICS & GYNECOLOGY

## 2019-11-25 NOTE — LETTER
NST sleeve cover sheet    Patient name: Rhiannon Mancera  : 1985  MRN: [id number]    MARTINEZ: Estimated Date of Delivery: 19    Obstetrician: __SLOGA_    Reason(s) for testing:  __________________________________________      Testing frequency:    ___ 2x/wk  ___ 1x/wk  ___ Dopplers  ___ BPP?       Last growth scan: __________________________________________

## 2019-11-26 ENCOUNTER — TELEPHONE (OUTPATIENT)
Dept: OBGYN CLINIC | Facility: CLINIC | Age: 34
End: 2019-11-26

## 2019-11-26 LAB
ALBUMIN SERPL-MCNC: 3.2 G/DL (ref 3.6–5.1)
ALBUMIN/GLOB SERPL: 1.1 (CALC) (ref 1–2.5)
ALP SERPL-CCNC: 199 U/L (ref 33–115)
ALT SERPL-CCNC: 8 U/L (ref 6–29)
AST SERPL-CCNC: 15 U/L (ref 10–30)
BASOPHILS # BLD AUTO: 50 CELLS/UL (ref 0–200)
BASOPHILS NFR BLD AUTO: 0.5 %
BILIRUB SERPL-MCNC: 0.2 MG/DL (ref 0.2–1.2)
BUN SERPL-MCNC: 10 MG/DL (ref 7–25)
BUN/CREAT SERPL: ABNORMAL (CALC) (ref 6–22)
CALCIUM SERPL-MCNC: 8.6 MG/DL (ref 8.6–10.2)
CHLORIDE SERPL-SCNC: 105 MMOL/L (ref 98–110)
CO2 SERPL-SCNC: 20 MMOL/L (ref 20–32)
CREAT SERPL-MCNC: 0.65 MG/DL (ref 0.5–1.1)
EOSINOPHIL # BLD AUTO: 90 CELLS/UL (ref 15–500)
EOSINOPHIL NFR BLD AUTO: 0.9 %
ERYTHROCYTE [DISTWIDTH] IN BLOOD BY AUTOMATED COUNT: 20.3 % (ref 11–15)
GLOBULIN SER CALC-MCNC: 2.9 G/DL (CALC) (ref 1.9–3.7)
GLUCOSE SERPL-MCNC: 88 MG/DL (ref 65–139)
HBA1C MFR BLD: 5.5 % OF TOTAL HGB
HCT VFR BLD AUTO: 34.6 % (ref 35–45)
HGB BLD-MCNC: 11.3 G/DL (ref 11.7–15.5)
LYMPHOCYTES # BLD AUTO: 2390 CELLS/UL (ref 850–3900)
LYMPHOCYTES NFR BLD AUTO: 23.9 %
MCH RBC QN AUTO: 27 PG (ref 27–33)
MCHC RBC AUTO-ENTMCNC: 32.7 G/DL (ref 32–36)
MCV RBC AUTO: 82.6 FL (ref 80–100)
MONOCYTES # BLD AUTO: 650 CELLS/UL (ref 200–950)
MONOCYTES NFR BLD AUTO: 6.5 %
NEUTROPHILS # BLD AUTO: 6820 CELLS/UL (ref 1500–7800)
NEUTROPHILS NFR BLD AUTO: 68.2 %
PLATELET # BLD AUTO: 224 THOUSAND/UL (ref 140–400)
PMV BLD REES-ECKER: 11.4 FL (ref 7.5–12.5)
POTASSIUM SERPL-SCNC: 4 MMOL/L (ref 3.5–5.3)
PROT SERPL-MCNC: 6.1 G/DL (ref 6.1–8.1)
RBC # BLD AUTO: 4.19 MILLION/UL (ref 3.8–5.1)
SL AMB EGFR AFRICAN AMERICAN: 136 ML/MIN/1.73M2
SL AMB EGFR NON AFRICAN AMERICAN: 117 ML/MIN/1.73M2
SODIUM SERPL-SCNC: 135 MMOL/L (ref 135–146)
WBC # BLD AUTO: 10 THOUSAND/UL (ref 3.8–10.8)

## 2019-11-26 NOTE — TELEPHONE ENCOUNTER
----- Message from [address] [place] [address] sent at 11/26/2019 11:29 AM EST -----  Please notify patient that hemoglobin is much improved at 11 3 following her iron infusions  Thank you!

## 2019-11-27 ENCOUNTER — ROUTINE PRENATAL (OUTPATIENT)
Dept: OBGYN CLINIC | Facility: CLINIC | Age: 34
End: 2019-11-27

## 2019-11-27 ENCOUNTER — TELEPHONE (OUTPATIENT)
Dept: OBGYN CLINIC | Facility: CLINIC | Age: 34
End: 2019-11-27

## 2019-11-27 ENCOUNTER — ROUTINE PRENATAL (OUTPATIENT)
Dept: PERINATAL CARE | Facility: CLINIC | Age: 34
End: 2019-11-27
Payer: COMMERCIAL

## 2019-11-27 VITALS
HEIGHT: 64 IN | BODY MASS INDEX: 34.25 KG/M2 | HEART RATE: 76 BPM | SYSTOLIC BLOOD PRESSURE: 105 MMHG | DIASTOLIC BLOOD PRESSURE: 63 MMHG | WEIGHT: 200.6 LBS

## 2019-11-27 VITALS — WEIGHT: 201 LBS | BODY MASS INDEX: 34.5 KG/M2 | SYSTOLIC BLOOD PRESSURE: 116 MMHG | DIASTOLIC BLOOD PRESSURE: 66 MMHG

## 2019-11-27 DIAGNOSIS — Z34.83 PRENATAL CARE, SUBSEQUENT PREGNANCY, THIRD TRIMESTER: Primary | ICD-10-CM

## 2019-11-27 DIAGNOSIS — O24.414 INSULIN CONTROLLED GESTATIONAL DIABETES MELLITUS (GDM) IN THIRD TRIMESTER: Primary | ICD-10-CM

## 2019-11-27 DIAGNOSIS — Z3A.37 37 WEEKS GESTATION OF PREGNANCY: ICD-10-CM

## 2019-11-27 LAB
SL AMB  POCT GLUCOSE, UA: NORMAL
SL AMB POCT URINE PROTEIN: NORMAL

## 2019-11-27 PROCEDURE — 59025 FETAL NON-STRESS TEST: CPT | Performed by: OBSTETRICS & GYNECOLOGY

## 2019-11-27 PROCEDURE — PNV: Performed by: OBSTETRICS & GYNECOLOGY

## 2019-11-27 NOTE — TELEPHONE ENCOUNTER
----- Message from Jeri Curling, MD sent at 2019 12:16 PM EST -----  Regarding:   Patient called and requested 19  Now moved to 2019 @ 12:30pm   Please adjust schedules

## 2019-11-27 NOTE — TELEPHONE ENCOUNTER
This makes her 45 and 6 which won't be approved by L&D, unless there's a medical reason- please advise

## 2019-11-27 NOTE — LETTER
November 27, 2019     Christina BotelloantoniettaDO  [address] [facility] [address]    Patient: Gracie Means   YOB: 1985   Date of Visit: 11/27/2019       Dear Dr Rex Hall: Thank you for referring Nadya Barfield to me for evaluation  Below are my notes for this consultation  If you have questions, please do not hesitate to call me  I look forward to following your patient along with you  Sincerely,        Sharyn Webster MD        CC: No Recipients  Sharyn Webster MD  11/27/2019  1:32 PM  Sign at close encounter  Fetal testing is reactive and reassuring   Sharyn Webster MD

## 2019-11-27 NOTE — TELEPHONE ENCOUNTER
This was denied by laborist on L&D (today is CAYETANO); pt needs to be 39 weeks for delivery even with a diagnosis of A2GDM  I will call the pt and let her know  CAYETANO is scheduled to do her section on 12/6

## 2019-11-27 NOTE — PATIENT INSTRUCTIONS

## 2019-11-27 NOTE — TELEPHONE ENCOUNTER
----- Message from Amie Peters MD sent at 2019 10:01 AM EST -----  Regarding:   Patient requested moving  to 2019 due to 2019 being her step-son's birthday  Just DIANA Valladares - please move in [address]

## 2019-11-27 NOTE — PROGRESS NOTES
Good FM  Rare uc's  Denies VB  Reviewed WENDIE's  Patient requests changing  due to it being scheduled on step-son's birthday    Moved to 2019 @ 2:30pm

## 2019-12-04 ENCOUNTER — ANESTHESIA EVENT (INPATIENT)
Dept: LABOR AND DELIVERY | Facility: HOSPITAL | Age: 34
End: 2019-12-04
Payer: COMMERCIAL

## 2019-12-04 ENCOUNTER — ANESTHESIA (INPATIENT)
Dept: LABOR AND DELIVERY | Facility: HOSPITAL | Age: 34
End: 2019-12-04
Payer: COMMERCIAL

## 2019-12-04 ENCOUNTER — HOSPITAL ENCOUNTER (INPATIENT)
Facility: HOSPITAL | Age: 34
LOS: 1 days | Discharge: HOME | End: 2019-12-05
Attending: OBSTETRICS & GYNECOLOGY | Admitting: OBSTETRICS & GYNECOLOGY
Payer: COMMERCIAL

## 2019-12-04 DIAGNOSIS — Z98.891 S/P REPEAT LOW TRANSVERSE C-SECTION: Primary | ICD-10-CM

## 2019-12-04 DIAGNOSIS — Z34.83 MULTIGRAVIDA IN THIRD TRIMESTER: ICD-10-CM

## 2019-12-04 DIAGNOSIS — Z3A.38 38 WEEKS GESTATION OF PREGNANCY: ICD-10-CM

## 2019-12-04 DIAGNOSIS — O34.219 PREVIOUS CESAREAN SECTION COMPLICATING PREGNANCY: ICD-10-CM

## 2019-12-04 PROBLEM — O99.013 ANEMIA AFFECTING PREGNANCY IN THIRD TRIMESTER: Status: RESOLVED | Noted: 2019-10-08 | Resolved: 2019-12-04

## 2019-12-04 PROBLEM — Z3A.37 37 WEEKS GESTATION OF PREGNANCY: Status: RESOLVED | Noted: 2019-11-25 | Resolved: 2019-12-04

## 2019-12-04 LAB
ABO GROUP BLD: NORMAL
BASE EXCESS BLDCOA CALC-SCNC: -2.8 MMOL/L (ref 3–11)
BASE EXCESS BLDCOV CALC-SCNC: -3.2 MMOL/L (ref 1–9)
BASOPHILS # BLD AUTO: 0.04 THOUSANDS/ΜL (ref 0–0.1)
BASOPHILS NFR BLD AUTO: 0 % (ref 0–1)
BLD GP AB SCN SERPL QL: NEGATIVE
EOSINOPHIL # BLD AUTO: 0.06 THOUSAND/ΜL (ref 0–0.61)
EOSINOPHIL NFR BLD AUTO: 1 % (ref 0–6)
ERYTHROCYTE [DISTWIDTH] IN BLOOD BY AUTOMATED COUNT: 20.4 % (ref 11.6–15.1)
GLUCOSE SERPL-MCNC: 72 MG/DL (ref 65–140)
GLUCOSE SERPL-MCNC: 77 MG/DL (ref 65–140)
HCO3 BLDCOA-SCNC: 24.5 MMOL/L (ref 17.3–27.3)
HCO3 BLDCOV-SCNC: 22.6 MMOL/L (ref 12.2–28.6)
HCT VFR BLD AUTO: 39.3 % (ref 34.8–46.1)
HGB BLD-MCNC: 12.5 G/DL (ref 11.5–15.4)
IMM GRANULOCYTES # BLD AUTO: 0.04 THOUSAND/UL (ref 0–0.2)
IMM GRANULOCYTES NFR BLD AUTO: 0 % (ref 0–2)
LYMPHOCYTES # BLD AUTO: 2.42 THOUSANDS/ΜL (ref 0.6–4.47)
LYMPHOCYTES NFR BLD AUTO: 21 % (ref 14–44)
MCH RBC QN AUTO: 27.1 PG (ref 26.8–34.3)
MCHC RBC AUTO-ENTMCNC: 31.8 G/DL (ref 31.4–37.4)
MCV RBC AUTO: 85 FL (ref 82–98)
MONOCYTES # BLD AUTO: 0.63 THOUSAND/ΜL (ref 0.17–1.22)
MONOCYTES NFR BLD AUTO: 6 % (ref 4–12)
NEUTROPHILS # BLD AUTO: 8.19 THOUSANDS/ΜL (ref 1.85–7.62)
NEUTS SEG NFR BLD AUTO: 72 % (ref 43–75)
NRBC BLD AUTO-RTO: 0 /100 WBCS
O2 CT VFR BLDCOA CALC: 4.1 ML/DL
OXYHGB MFR BLDCOA: 18.3 %
OXYHGB MFR BLDCOV: 63 %
PCO2 BLDCOA: 52.2 MM[HG] (ref 30–60)
PCO2 BLDCOV: 43.2 MM HG (ref 27–43)
PH BLDCOA: 7.29 [PH] (ref 7.23–7.43)
PH BLDCOV: 7.34 [PH] (ref 7.19–7.49)
PLATELET # BLD AUTO: 233 THOUSANDS/UL (ref 149–390)
PMV BLD AUTO: 11.2 FL (ref 8.9–12.7)
PO2 BLDCOA: 11.9 MM HG (ref 5–25)
PO2 BLDCOV: 26.5 MM HG (ref 15–45)
RBC # BLD AUTO: 4.62 MILLION/UL (ref 3.81–5.12)
RH BLD: POSITIVE
RPR SER QL: NORMAL
SAO2 % BLDCOV: 14.6 ML/DL
SPECIMEN EXPIRATION DATE: NORMAL
WBC # BLD AUTO: 11.38 THOUSAND/UL (ref 4.31–10.16)

## 2019-12-04 PROCEDURE — 86592 SYPHILIS TEST NON-TREP QUAL: CPT | Performed by: OBSTETRICS & GYNECOLOGY

## 2019-12-04 PROCEDURE — 82805 BLOOD GASES W/O2 SATURATION: CPT | Performed by: OBSTETRICS & GYNECOLOGY

## 2019-12-04 PROCEDURE — 82948 REAGENT STRIP/BLOOD GLUCOSE: CPT

## 2019-12-04 PROCEDURE — 99024 POSTOP FOLLOW-UP VISIT: CPT | Performed by: OBSTETRICS & GYNECOLOGY

## 2019-12-04 PROCEDURE — 99214 OFFICE O/P EST MOD 30 MIN: CPT

## 2019-12-04 PROCEDURE — 86900 BLOOD TYPING SEROLOGIC ABO: CPT | Performed by: OBSTETRICS & GYNECOLOGY

## 2019-12-04 PROCEDURE — 86850 RBC ANTIBODY SCREEN: CPT | Performed by: OBSTETRICS & GYNECOLOGY

## 2019-12-04 PROCEDURE — 85025 COMPLETE CBC W/AUTO DIFF WBC: CPT | Performed by: OBSTETRICS & GYNECOLOGY

## 2019-12-04 PROCEDURE — 4A1HXCZ MONITORING OF PRODUCTS OF CONCEPTION, CARDIAC RATE, EXTERNAL APPROACH: ICD-10-PCS | Performed by: OBSTETRICS & GYNECOLOGY

## 2019-12-04 PROCEDURE — 59510 CESAREAN DELIVERY: CPT | Performed by: OBSTETRICS & GYNECOLOGY

## 2019-12-04 PROCEDURE — 86901 BLOOD TYPING SEROLOGIC RH(D): CPT | Performed by: OBSTETRICS & GYNECOLOGY

## 2019-12-04 RX ORDER — SODIUM CHLORIDE 9 MG/ML
125 INJECTION, SOLUTION INTRAVENOUS CONTINUOUS
Status: DISCONTINUED | OUTPATIENT
Start: 2019-12-04 | End: 2019-12-04

## 2019-12-04 RX ORDER — DEXAMETHASONE SODIUM PHOSPHATE 10 MG/ML
8 INJECTION, SOLUTION INTRAMUSCULAR; INTRAVENOUS ONCE AS NEEDED
Status: DISPENSED | OUTPATIENT
Start: 2019-12-04 | End: 2019-12-05

## 2019-12-04 RX ORDER — KETOROLAC TROMETHAMINE 30 MG/ML
30 INJECTION, SOLUTION INTRAMUSCULAR; INTRAVENOUS EVERY 6 HOURS
Status: DISCONTINUED | OUTPATIENT
Start: 2019-12-04 | End: 2019-12-05

## 2019-12-04 RX ORDER — BUPIVACAINE HYDROCHLORIDE 7.5 MG/ML
INJECTION, SOLUTION INTRASPINAL AS NEEDED
Status: DISCONTINUED | OUTPATIENT
Start: 2019-12-04 | End: 2019-12-04 | Stop reason: SURG

## 2019-12-04 RX ORDER — SIMETHICONE 80 MG
80 TABLET,CHEWABLE ORAL 4 TIMES DAILY PRN
Status: DISCONTINUED | OUTPATIENT
Start: 2019-12-04 | End: 2019-12-05

## 2019-12-04 RX ORDER — CEFAZOLIN SODIUM 1 G/50ML
1000 SOLUTION INTRAVENOUS ONCE
Status: COMPLETED | OUTPATIENT
Start: 2019-12-04 | End: 2019-12-04

## 2019-12-04 RX ORDER — OXYCODONE HYDROCHLORIDE 10 MG/1
10 TABLET ORAL EVERY 4 HOURS PRN
Status: DISCONTINUED | OUTPATIENT
Start: 2019-12-05 | End: 2019-12-05 | Stop reason: HOSPADM

## 2019-12-04 RX ORDER — SODIUM CHLORIDE, SODIUM LACTATE, POTASSIUM CHLORIDE, CALCIUM CHLORIDE 600; 310; 30; 20 MG/100ML; MG/100ML; MG/100ML; MG/100ML
INJECTION, SOLUTION INTRAVENOUS CONTINUOUS PRN
Status: DISCONTINUED | OUTPATIENT
Start: 2019-12-04 | End: 2019-12-04 | Stop reason: SURG

## 2019-12-04 RX ORDER — SODIUM CHLORIDE, SODIUM LACTATE, POTASSIUM CHLORIDE, CALCIUM CHLORIDE 600; 310; 30; 20 MG/100ML; MG/100ML; MG/100ML; MG/100ML
125 INJECTION, SOLUTION INTRAVENOUS CONTINUOUS
Status: DISCONTINUED | OUTPATIENT
Start: 2019-12-04 | End: 2019-12-04

## 2019-12-04 RX ORDER — OXYTOCIN/RINGER'S LACTATE 30/500 ML
PLASTIC BAG, INJECTION (ML) INTRAVENOUS CONTINUOUS PRN
Status: DISCONTINUED | OUTPATIENT
Start: 2019-12-04 | End: 2019-12-04 | Stop reason: SURG

## 2019-12-04 RX ORDER — OXYTOCIN/RINGER'S LACTATE 30/500 ML
62.5 PLASTIC BAG, INJECTION (ML) INTRAVENOUS CONTINUOUS
Status: DISCONTINUED | OUTPATIENT
Start: 2019-12-04 | End: 2019-12-04

## 2019-12-04 RX ORDER — ACETAMINOPHEN 325 MG/1
975 TABLET ORAL EVERY 8 HOURS SCHEDULED
Status: DISCONTINUED | OUTPATIENT
Start: 2019-12-04 | End: 2019-12-05 | Stop reason: HOSPADM

## 2019-12-04 RX ORDER — DIPHENHYDRAMINE HCL 25 MG
25 TABLET ORAL EVERY 6 HOURS PRN
Status: DISCONTINUED | OUTPATIENT
Start: 2019-12-04 | End: 2019-12-05

## 2019-12-04 RX ORDER — METOCLOPRAMIDE HYDROCHLORIDE 5 MG/ML
5 INJECTION INTRAMUSCULAR; INTRAVENOUS EVERY 6 HOURS PRN
Status: ACTIVE | OUTPATIENT
Start: 2019-12-04 | End: 2019-12-05

## 2019-12-04 RX ORDER — IBUPROFEN 600 MG/1
600 TABLET ORAL EVERY 6 HOURS SCHEDULED
Status: DISCONTINUED | OUTPATIENT
Start: 2019-12-04 | End: 2019-12-05

## 2019-12-04 RX ORDER — DIAPER,BRIEF,INFANT-TODD,DISP
1 EACH MISCELLANEOUS DAILY PRN
Status: DISCONTINUED | OUTPATIENT
Start: 2019-12-04 | End: 2019-12-05

## 2019-12-04 RX ORDER — CALCIUM CARBONATE 200(500)MG
1000 TABLET,CHEWABLE ORAL DAILY PRN
Status: DISCONTINUED | OUTPATIENT
Start: 2019-12-04 | End: 2019-12-05

## 2019-12-04 RX ORDER — DOCUSATE SODIUM 100 MG/1
100 CAPSULE, LIQUID FILLED ORAL 2 TIMES DAILY
Status: DISCONTINUED | OUTPATIENT
Start: 2019-12-04 | End: 2019-12-05

## 2019-12-04 RX ORDER — MORPHINE SULFATE 0.5 MG/ML
INJECTION, SOLUTION EPIDURAL; INTRATHECAL; INTRAVENOUS AS NEEDED
Status: DISCONTINUED | OUTPATIENT
Start: 2019-12-04 | End: 2019-12-04 | Stop reason: SURG

## 2019-12-04 RX ORDER — KETOROLAC TROMETHAMINE 30 MG/ML
INJECTION, SOLUTION INTRAMUSCULAR; INTRAVENOUS AS NEEDED
Status: DISCONTINUED | OUTPATIENT
Start: 2019-12-04 | End: 2019-12-04 | Stop reason: SURG

## 2019-12-04 RX ORDER — ONDANSETRON 2 MG/ML
4 INJECTION INTRAMUSCULAR; INTRAVENOUS EVERY 8 HOURS PRN
Status: DISCONTINUED | OUTPATIENT
Start: 2019-12-04 | End: 2019-12-05

## 2019-12-04 RX ORDER — HYDROMORPHONE HCL/PF 1 MG/ML
0.5 SYRINGE (ML) INJECTION EVERY 2 HOUR PRN
Status: ACTIVE | OUTPATIENT
Start: 2019-12-04 | End: 2019-12-05

## 2019-12-04 RX ORDER — HYDROMORPHONE HCL/PF 1 MG/ML
1 SYRINGE (ML) INJECTION EVERY 2 HOUR PRN
Status: DISCONTINUED | OUTPATIENT
Start: 2019-12-05 | End: 2019-12-05

## 2019-12-04 RX ORDER — ONDANSETRON 2 MG/ML
4 INJECTION INTRAMUSCULAR; INTRAVENOUS EVERY 8 HOURS PRN
Status: DISCONTINUED | OUTPATIENT
Start: 2019-12-04 | End: 2019-12-04

## 2019-12-04 RX ORDER — ONDANSETRON 2 MG/ML
INJECTION INTRAMUSCULAR; INTRAVENOUS AS NEEDED
Status: DISCONTINUED | OUTPATIENT
Start: 2019-12-04 | End: 2019-12-04 | Stop reason: SURG

## 2019-12-04 RX ORDER — ACETAMINOPHEN 325 MG/1
975 TABLET ORAL EVERY 8 HOURS SCHEDULED
Status: DISCONTINUED | OUTPATIENT
Start: 2019-12-05 | End: 2019-12-04

## 2019-12-04 RX ORDER — OXYCODONE HYDROCHLORIDE 5 MG/1
5 TABLET ORAL EVERY 4 HOURS PRN
Status: DISCONTINUED | OUTPATIENT
Start: 2019-12-05 | End: 2019-12-05 | Stop reason: HOSPADM

## 2019-12-04 RX ORDER — NALOXONE HYDROCHLORIDE 0.4 MG/ML
0.1 INJECTION, SOLUTION INTRAMUSCULAR; INTRAVENOUS; SUBCUTANEOUS
Status: ACTIVE | OUTPATIENT
Start: 2019-12-04 | End: 2019-12-05

## 2019-12-04 RX ADMIN — KETOROLAC TROMETHAMINE 30 MG: 30 INJECTION, SOLUTION INTRAMUSCULAR at 05:50

## 2019-12-04 RX ADMIN — SODIUM CHLORIDE, SODIUM LACTATE, POTASSIUM CHLORIDE, AND CALCIUM CHLORIDE: .6; .31; .03; .02 INJECTION, SOLUTION INTRAVENOUS at 05:32

## 2019-12-04 RX ADMIN — ACETAMINOPHEN 975 MG: 325 TABLET ORAL at 14:59

## 2019-12-04 RX ADMIN — KETOROLAC TROMETHAMINE 30 MG: 30 INJECTION, SOLUTION INTRAMUSCULAR at 12:59

## 2019-12-04 RX ADMIN — AZITHROMYCIN 500 MG: 500 INJECTION, POWDER, LYOPHILIZED, FOR SOLUTION INTRAVENOUS at 05:39

## 2019-12-04 RX ADMIN — Medication 250 MILLI-UNITS/MIN: at 05:42

## 2019-12-04 RX ADMIN — ONDANSETRON 4 MG: 2 INJECTION INTRAMUSCULAR; INTRAVENOUS at 05:36

## 2019-12-04 RX ADMIN — ACETAMINOPHEN 975 MG: 325 TABLET ORAL at 08:05

## 2019-12-04 RX ADMIN — Medication 62.5 MILLI-UNITS/MIN: at 07:23

## 2019-12-04 RX ADMIN — MORPHINE SULFATE 0.2 MG: 0.5 INJECTION, SOLUTION EPIDURAL; INTRATHECAL; INTRAVENOUS at 05:26

## 2019-12-04 RX ADMIN — PHENYLEPHRINE HYDROCHLORIDE 50 MCG/MIN: 10 INJECTION INTRAVENOUS at 05:37

## 2019-12-04 RX ADMIN — DOCUSATE SODIUM 100 MG: 100 CAPSULE, LIQUID FILLED ORAL at 18:47

## 2019-12-04 RX ADMIN — SODIUM CHLORIDE: 0.9 INJECTION, SOLUTION INTRAVENOUS at 05:17

## 2019-12-04 RX ADMIN — SODIUM CHLORIDE 125 ML/HR: 0.9 INJECTION, SOLUTION INTRAVENOUS at 13:20

## 2019-12-04 RX ADMIN — SODIUM CHLORIDE 125 ML/HR: 0.9 INJECTION, SOLUTION INTRAVENOUS at 02:45

## 2019-12-04 RX ADMIN — SODIUM CHLORIDE 125 ML/HR: 0.9 INJECTION, SOLUTION INTRAVENOUS at 04:06

## 2019-12-04 RX ADMIN — CEFAZOLIN SODIUM 2000 MG: 1 SOLUTION INTRAVENOUS at 05:33

## 2019-12-04 RX ADMIN — ACETAMINOPHEN 975 MG: 325 TABLET ORAL at 22:20

## 2019-12-04 RX ADMIN — PHENYLEPHRINE HYDROCHLORIDE 200 MCG: 10 INJECTION INTRAVENOUS at 05:37

## 2019-12-04 RX ADMIN — BUPIVACAINE HYDROCHLORIDE IN DEXTROSE 1.8 ML: 7.5 INJECTION, SOLUTION SUBARACHNOID at 05:26

## 2019-12-04 RX ADMIN — PHENYLEPHRINE HYDROCHLORIDE 200 MCG: 10 INJECTION INTRAVENOUS at 05:30

## 2019-12-04 NOTE — PLAN OF CARE
Problem: PAIN - ADULT  Goal: Verbalizes/displays adequate comfort level or baseline comfort level  Description  Interventions:  - Encourage patient to monitor pain and request assistance  - Assess pain using appropriate pain scale  - Administer analgesics based on type and severity of pain and evaluate response  - Implement non-pharmacological measures as appropriate and evaluate response  - Consider cultural and social influences on pain and pain management  - Notify physician/advanced practitioner if interventions unsuccessful or patient reports new pain  Outcome: Progressing     Problem: INFECTION - ADULT  Goal: Absence or prevention of progression during hospitalization  Description  INTERVENTIONS:  - Assess and monitor for signs and symptoms of infection  - Monitor lab/diagnostic results  - Monitor all insertion sites, i e  indwelling lines, tubes, and drains  - Monitor endotracheal if appropriate and nasal secretions for changes in amount and color  - [place] appropriate cooling/warming therapies per order  - Administer medications as ordered  - Instruct and encourage patient and family to use good hand hygiene technique  - Identify and instruct in appropriate isolation precautions for identified infection/condition  Outcome: Progressing  Goal: Absence of fever/infection during neutropenic period  Description  INTERVENTIONS:  - Monitor WBC    Outcome: Progressing     Problem: SAFETY ADULT  Goal: Patient will remain free of falls  Description  INTERVENTIONS:  - Assess patient frequently for physical needs  -  Identify cognitive and physical deficits and behaviors that affect risk of falls    -  [place] fall precautions as indicated by assessment   - Educate patient/family on patient safety including physical limitations  - Instruct patient to call for assistance with activity based on assessment  - Modify environment to reduce risk of injury  - Consider OT/PT consult to assist with strengthening/mobility  Outcome: Progressing  Goal: Maintain or return to baseline ADL function  Description  INTERVENTIONS:  -  Assess patient's ability to carry out ADLs; assess patient's baseline for ADL function and identify physical deficits which impact ability to perform ADLs (bathing, care of mouth/teeth, toileting, grooming, dressing, etc )  - Assess/evaluate cause of self-care deficits   - Assess range of motion  - Assess patient's mobility; develop plan if impaired  - Assess patient's need for assistive devices and provide as appropriate  - Encourage maximum independence but intervene and supervise when necessary  - Involve family in performance of ADLs  - Assess for home care needs following discharge   - Consider OT consult to assist with ADL evaluation and planning for discharge  - Provide patient education as appropriate  Outcome: Progressing  Goal: Maintain or return mobility status to optimal level  Description  INTERVENTIONS:  - Assess patient's baseline mobility status (ambulation, transfers, stairs, etc )    - Identify cognitive and physical deficits and behaviors that affect mobility  - Identify mobility aids required to assist with transfers and/or ambulation (gait belt, sit-to-stand, lift, walker, cane, etc )  - [place] fall precautions as indicated by assessment  - Record patient progress and toleration of activity level on Mobility SBAR; progress patient to next Phase/Stage  - Instruct patient to call for assistance with activity based on assessment  - Consider rehabilitation consult to assist with strengthening/weightbearing, etc   Outcome: Progressing     Problem: Knowledge Deficit  Goal: Patient/family/caregiver demonstrates understanding of disease process, treatment plan, medications, and discharge instructions  Description  Complete learning assessment and assess knowledge base    Interventions:  - Provide teaching at level of understanding  - Provide teaching via preferred learning methods  Outcome: Progressing     Problem: DISCHARGE PLANNING  Goal: Discharge to home or other facility with appropriate resources  Description  INTERVENTIONS:  - Identify barriers to discharge w/patient and caregiver  - Arrange for needed discharge resources and transportation as appropriate  - Identify discharge learning needs (meds, wound care, etc )  - Arrange for interpretive services to assist at discharge as needed  - Refer to Case Management Department for coordinating discharge planning if the patient needs post-hospital services based on physician/advanced practitioner order or complex needs related to functional status, cognitive ability, or social support system  Outcome: Progressing     Problem: DISCHARGE PLANNING - CARE MANAGEMENT  Goal: Discharge to post-acute care or home with appropriate resources  Description  INTERVENTIONS:  - Conduct assessment to determine patient/family and health care team treatment goals, and need for post-acute services based on payer coverage, community resources, and patient preferences, and barriers to discharge  - Address psychosocial, clinical, and financial barriers to discharge as identified in assessment in conjunction with the patient/family and health care team  - Arrange appropriate level of post-acute services according to patients   needs and preference and payer coverage in collaboration with the physician and health care team  - Communicate with and update the patient/family, physician, and health care team regarding progress on the discharge plan  - Arrange appropriate transportation to post-acute venues  Outcome: Progressing     Problem: ALTERATION IN THE BREAST  Goal: Optimize infant feeding at the breast  Description  INTERVENTIONS:  - Latch, breast and nipple assessment  - Assess prior breast feeding history  - Hand expression of breast milk  - For cracked, bleeding and or sore nipples reassess latch, treat damaged nipple  -Educate mother on feeding cues  -Positioning/latch techniques  Outcome: Progressing     Problem: INADEQUATE LATCH, SUCK OR SWALLOW  Goal: Demonstrate ability to latch and sustain latch, audible swallowing and satiety  Description  INTERVENTIONS:  - Assess oral anatomy, notify Physician/AP for abnormal findings  - Establish milk expression  - Maximize feeding opportunity (skin to skin, behavioral state)  - Position/latch techniques  - Discourage use of pacifier-artificial nipple  - Mechanical pumping  - Nipple Shield  - Supplemental formula feeding (Physician/AP order)  - Alternative feeding method  Outcome: Progressing     Problem: POSTPARTUM  Goal: Experiences normal postpartum course  Description  INTERVENTIONS:  - Monitor maternal vital signs  - Assess uterine involution and lochia  Outcome: Progressing  Goal: Appropriate maternal -  bonding  Description  INTERVENTIONS:  - Identify family support  - Assess for appropriate maternal/infant bonding   -Encourage maternal/infant bonding opportunities  - Referral to  or  as needed  Outcome: Progressing  Goal: Establishment of infant feeding pattern  Description  INTERVENTIONS:  - Assess breast/bottle feeding  - Refer to lactation as needed  Outcome: Progressing  Goal: Incision(s), wounds(s) or drain site(s) healing without S/S of infection  Description  INTERVENTIONS  - Assess and document risk factors for skin impairment   - Assess and document dressing, incision, wound bed, drain sites and surrounding tissue  - Consider nutrition services referral as needed  - Oral mucous membranes remain intact  - Provide patient/ family education  Outcome: Progressing

## 2019-12-04 NOTE — DISCHARGE SUMMARY
Discharge Summary - OB/GYN   Sydney Thurston 30 y o  female MRN: [id number]  Unit/Bed#: LD Triage 2- Encounter: [id number]      Admission Date: 2019     Discharge Date: 2019     Admitting Diagnosis:   1  Pregnancy at 38w5d  2  Hx of prior  section x2  3  Labor  4  Obesity  5  A2GDM      Discharge Diagnosis:   Same, delivered      Procedures: repeat  section, low transverse incision    Attending: Colette Donovan MD    Hospital Course:     Sydney Thurston is a 30 y o  [id number] at 38w5d who was initially evaluated for rule out labor  She was noted to make cervical change from 1 to 3 cm in 2 hours  Decision was made to proceed with repeat low-transverse  section  She delivered a viable female  on 19 at 0542  Weight 8lbs 1oz via repeat  section, low transverse incision  Apgars were 9 (1 min) and 9 (5 min)   was transferred to  nursery  Patient tolerated the procedure well and was transferred to recovery in stable condition  Her post-operative course was uncomplicated  Preoperative hemaglobin was 12 5, postoperative was 11 3  Her postoperative pain was well controlled with oral analgesics  On day of discharge, she was ambulating and able to reasonably perform all ADLs  She was voiding and had appropriate bowel function  Pain was well controlled  She was discharged home on post-operative day #1 without complications  Patient was instructed to follow up with her OB as an outpatient and was given appropriate warnings to call provider if she develops signs of infection or uncontrolled pain  Complications: none apparent    Condition at discharge: good     Discharge instructions/Information to patient and family:   See after visit summary for information provided to patient and family  Provisions for Follow-Up Care:  See after visit summary for information related to follow-up care and any pertinent home health orders  Disposition: Home    Planned Readmission: No    Discharge Medications: For a complete list of the patient's medications, please refer to her med rec

## 2019-12-04 NOTE — H&P
H&P Exam - Obstetrics   Carmen Coleman 30 y o  female MRN: [id number]  Unit/Bed#: LD Triage 2 Encounter: [id number]    Assessment/Plan     Assessment:    · [id number] @ 45 5/7 week presents with uterine contractions    Plan:    · Admit to L&D  · Will order CBC, Type and Screen, and RPR  · NPO  · IV Hydration: NS 125cc/hr  · Ancef 1g IV 1x dose  · Plan for repeat       Plan Discussed and Agreed upon with Dr Gem Howard, Dr Rajani Belle, and Dr Sanjeev Sim      History of Present Illness      Chief Complaint: "I am having Contractions"    HPI:  Carmen Coleman is a 30 y o  [id number] female with an MARTINEZ of 2019, by Ultrasound at 38w5d weeks gestation who is being admitted for repeat   Her current obstetrical history is significant for A2GDM, prior  x2, and Obesity  Contractions: every 4-5min lasting for 30seconds  Leakage of fluid: None  Bleeding: None  Fetal movement: present  Last meal: 8:00pm on 12/3/19  Last Cervical Check about 1 week ago and was 0 5/50/-4  Pregnancy complications: Gestational DM and Prior  x2, and Obesity  Review of Systems   Constitutional: Negative for chills and fever  Eyes: Negative for visual disturbance  Respiratory: Negative for shortness of breath  Cardiovascular: Negative for leg swelling  Gastrointestinal: Negative for constipation, diarrhea, nausea and vomiting  Abdominal Cramping   Skin: Negative for rash  Neurological: Negative for headaches  Psychiatric/Behavioral: Negative for behavioral problems  All other systems reviewed and are negative        Historical Information   OB History    Para Term  AB Living   5 2 2 0 2 2   SAB TAB Ectopic Multiple Live Births   2 0 0 0 2      # Outcome Date GA Lbr Herman/2nd Weight Sex Delivery Anes PTL Lv   5 Current            4 SAB 2018 8w0d          3 SAB 2018 8w0d          2 Term 17 38w6d  4580 g (10 lb 1 6 oz) M CS-LTranv Spinal N AGUSTÍN   1 Term 03/29/15 40w6d  4479 g (9 lb 14 oz) M CS-Unspec Spinal N AGUSTÍN     Past Medical History:   Diagnosis Date    Abnormal Pap smear of cervix     HPV (human papilloma virus) infection     Miscarriage 2018    Recurrent pregnancy loss, antepartum condition or complication     Varicella      Past Surgical History:   Procedure Laterality Date    APPENDECTOMY       SECTION      COLPOSCOPY      [place]  DELIV[place] ONLY N/A 2017    Procedure:  SECTION () REPEAT;  Surgeon: Earlyne Sacks, MD;  Location: [facility];  Service: Obstetrics     Social History   Social History     Substance and Sexual Activity   Alcohol Use No     Social History     Substance and Sexual Activity   Drug Use No     Social History     Tobacco Use   Smoking Status Never Smoker   Smokeless Tobacco Never Used     Family History:   Family History   Problem Relation Age of Onset    Stroke Maternal Grandfather     Cancer Maternal Grandfather     Cancer Paternal Grandmother     No Known Problems Mother     Stroke Father     No Known Problems Sister     No Known Problems Brother     No Known Problems Son     No Known Problems Maternal Grandmother     No Known Problems Paternal Grandfather     No Known Problems Son        Meds/Allergies   PTA meds:   Prior to Admission Medications   Prescriptions Last Dose Informant Patient Reported? Taking? Insulin Pen Needle (BD PEN NEEDLE ALEC U/F) 32G X 4 MM MISC  Self No No   Sig: Use 1 a day  ONETOUCH DELICA LANCETS 92Q MISC  Self No No   Sig: Test 4 times daily  Diet controlled gestational daibetes   ONETOUCH VERIO test strip  Self No No   Sig: Test 4 times daily  Diet controlled gestational diabetes   Prenatal MV-Min-Fe Fum-FA-DHA (PRENATAL 1 PO) 12/3/2019 at 0700 Self Yes Yes   Sig: Take 1 tablet by mouth daily   insulin glargine (LANTUS SOLOSTAR) 100 units/mL injection pen 12/3/2019 at 2200 Self No Yes   Sig: Inject SC 25 units at 9 or 10 PM daily  To be titrated     iron polysaccharides (NIFEREX) 150 mg capsule More than a month at Unknown time Self Yes No   Sig: Take 150 mg by mouth 2 (two) times a day       Facility-Administered Medications: None     No Known Allergies    Objective   Vitals: Blood pressure 122/78, pulse 91, temperature 97 8 °F (36 6 °C), temperature source Oral, resp  rate 18, height 5' 4" (1 626 m), weight 88 5 kg (195 lb), last menstrual period 03/08/2019, SpO2 98 %, not currently breastfeeding  Body mass index is 33 47 kg/m²  Invasive Devices     Peripheral Intravenous Line            Peripheral IV 12/04/19 Distal;Dorsal (posterior); Left Forearm less than 1 day              Physical Exam   Constitutional: She appears well-developed and well-nourished  No distress  HENT:   Head: Normocephalic and atraumatic  Eyes: Pupils are equal, round, and reactive to light  EOM are normal    Neck: Normal range of motion  Neck supple  Cardiovascular: Normal rate, regular rhythm, normal heart sounds and intact distal pulses  Exam reveals no gallop and no friction rub  No murmur heard  Pulmonary/Chest: Effort normal and breath sounds normal  No respiratory distress  Abdominal: Bowel sounds are normal    Gravid   Genitourinary:   Genitourinary Comments: SVE: 3/ 90/ -2   Musculoskeletal: She exhibits no edema  Skin: She is not diaphoretic  Nursing note and vitals reviewed      Prenatal Labs:   Blood Type:   Lab Results   Component Value Date/Time    ABO Grouping O 06/16/2017 01:40 AM    ABO Grouping O 03/29/2015 01:11 PM     , D (Rh type):   Lab Results   Component Value Date/Time    Rh Factor Positive 06/16/2017 01:40 AM    Rh Factor Positive 03/29/2015 01:11 PM     , Antibody Screen:   Lab Results   Component Value Date/Time    Antibody Screen Negative 03/29/2015 01:11 PM    , 1 hour Glucola:   Lab Results   Component Value Date/Time    Glucose 166 (H) 09/25/2019 07:26 AM   Rubella:   Lab Results   Component Value Date/Time    Rubella IgG Quant 53 9 11/21/2016 08:42 AM        , VDRL/RPR:   Lab Results   Component Value Date/Time    RPR SCREEN Nonreactive 03/29/2015 01:11 PM    RPR NON-REACTIVE 09/25/2019 07:26 AM    RPR Non-Reactive 06/16/2017 01:40 AM      , Hep B:   Lab Results   Component Value Date/Time    Hepatitis B Surface Ag Non-reactive 11/21/2016 08:42 AM     , Hep C: No components found for: HEPCSAG, EXTHEPCSAG   , HIV:   Lab Results   Component Value Date/Time    HIV-1/HIV-2 Ab Non-Reactive 11/21/2016 08:42 AM    HIV AG/AB, 4th Gen NON-REACTIVE 06/05/2019 07:15 AM     , Chlamydia: No results found for: EXTCHLAMYDIA  , Gonorrhea:   Lab Results   Component Value Date/Time    N gonorrhoeae, DNA Probe Negative 06/04/2019 05:50 PM     , Group B Strep:    Lab Results   Component Value Date/Time    Strep Grp B PCR Negative for Beta Hemolytic Strep Grp B by PCR 11/19/2019 04:17 PM        Imaging, EKG, Pathology, and Other Studies: I have personally reviewed pertinent reports

## 2019-12-04 NOTE — DISCHARGE INSTRUCTIONS
Section   WHAT YOU SHOULD KNOW:   A  delivery, or , is abdominal surgery to deliver your baby  There are many reasons you may need a   · A  may be scheduled before labor if you had a  with your last baby  It may be scheduled if your baby is not positioned normally, or you are pregnant with more than 1 baby  · Your caregiver may perform an emergency  during labor to prevent life-threatening complications for you or your baby  A  may be done if your cervix does not dilate after several hours of active labor  · Other reasons for a  include maternal infections and problems with the placenta  AFTER YOU LEAVE:   Medicines:   · Prescription pain medicine  may be given  Ask how to take this medicine safely  · Acetaminophen  decreases pain and fever  It is available without a doctor's order  Ask how much to take and how often to take it  Follow directions  Acetaminophen can cause liver damage if not taken correctly  · NSAIDs  help decrease swelling and pain or fever  This medicine is available with or without a doctor's order  NSAIDs can cause stomach bleeding or kidney problems in certain people  If you take blood thinner medicine, always ask your obstetrician if NSAIDs are safe for you  Always read the medicine label and follow directions  · Take your medicine as directed  Contact your obstetrician (OB) if you think your medicine is not helping or if you have side effects  Tell him if you are allergic to any medicine  Keep a list of the medicines, vitamins, and herbs you take  Include the amounts, and when and why you take them  Bring the list or the pill bottles to follow-up visits  Carry your medicine list with you in case of an emergency  Follow up with your OB as directed: You may need to return to have your stitches or staples removed  Write down your questions so you remember to ask them during your visits    Wound care:  Carefully wash your wound with soap and water every day  Keep your wound clean and dry  Wear loose, comfortable clothes that do not rub against your wound  Ask your OB about bathing and showering  Drink plenty of liquids: You can lower your risk for a blood clot if you drink plenty of liquids  Ask how much liquid to drink each day and which liquids are best for you  Limit activity until you have fully recovered from surgery:   · Ask when it is safe for you to drive, walk up stairs, lift heavy objects, and have sex  · Ask when it is okay to exercise, and what types of exercise to do  Start slowly and do more as you get stronger  Contact your OB if:   · You have heavy vaginal bleeding that fills 1 or more sanitary pads in 1 hour  · You have a fever  · Your incision is swollen, red, or draining pus  · You have questions or concerns about yourself or your baby  Seek care immediately or call 911 if:   · Blood soaks through your bandage  · Your stitches come apart  · You feel lightheaded, short of breath, and have chest pain  · You cough up blood  · Your arm or leg feels warm, tender, and painful  It may look swollen and red  [address] is for End User's use only and may not be sold, redistributed or otherwise used for commercial purposes  All illustrations and images included in CareNotes® are the copyrighted property of A D A M , Inc  or Emmanuel Higgins  The above information is an  only  It is not intended as medical advice for individual conditions or treatments  Talk to your doctor, nurse or pharmacist before following any medical regimen to see if it is safe and effective for you  Postpartum Bleeding   WHAT YOU NEED TO KNOW:   Postpartum bleeding is vaginal bleeding after childbirth  This bleeding is normal, whether your baby was born vaginally or by    It contains blood and the tissue that lined the inside of your uterus when you were pregnant  DISCHARGE INSTRUCTIONS:   What to expect with postpartum bleeding:  Postpartum bleeding usually lasts at least 10 days, and may last longer than 6 weeks  Your bleeding may range from light (barely staining a pad) to heavy (soaking a pad in 1 hour)  Usually, you have heavier bleeding right after childbirth, which slows over the next few weeks until it stops  The bleeding is red or dark brown with clots for the first 1 to 3 days  It then turns pink for several days, and then becomes a white or yellow discharge until it ends  Follow up with your obstetrician as directed:  Do not have sex until your obstetrician says it is okay  Write down your questions so you remember to ask them during your visits  Contact your healthcare provider or obstetrician if:   · Your bleeding increases, or you have heavy bleeding that soaks a pad in 1 hour for 2 hours in a row  · You pass large blood clots  · You are breathing faster than normal, or your heart is beating faster than normal     · You are urinating less than usual, or not at all  · You feel dizzy  · You have questions or concerns about your condition or care  Seek immediate care or call 911 if:   · You are suddenly short of breath and feel lightheaded  · You have sudden chest pain  © 2017 [address] [facility] Information is for End User's use only and may not be sold, redistributed or otherwise used for commercial purposes  All illustrations and images included in CareNotes® are the copyrighted property of A D A M , Inc  or Emmanuel Higgins  The above information is an  only  It is not intended as medical advice for individual conditions or treatments  Talk to your doctor, nurse or pharmacist before following any medical regimen to see if it is safe and effective for you  Postpartum Depression   WHAT YOU NEED TO KNOW:   What is postpartum depression?   Postpartum depression is a mood disorder that occurs after giving birth  A mood is an emotion or a feeling  Moods affect your behavior and how you feel about yourself and life in general  Depression is a sad mood that you cannot control  Women often feel sad, afraid, or nervous after their baby is born  These feelings are called postpartum blues or baby blues, and they usually go away in 1 to 2 weeks  With postpartum depression, these symptoms get worse and continue for more than 2 weeks  Postpartum depression is a serious condition that affects your daily activities and relationships  What causes postpartum depression? Healthcare providers do not know exactly what causes postpartum depression  It may be caused by a sudden drop in hormone levels after childbirth  A previous episode of postpartum depression or a family history of depression may increase your risk  Several things may trigger postpartum depression:  · Lack of support from the baby's father or other family members    · Feeling more tired than usual    · Stress, a poor diet, or lack of sleep    · Pain after childbirth or pain during breastfeeding    · Sudden change in lifestyle  How is postpartum depression diagnosed? Postpartum depression affects your daily activities and your relationships with other people  Healthcare providers will ask you questions about your signs and symptoms and how they are affecting your life  The symptoms of postpartum depression usually begin within 1 month after childbirth  You feel depressed or lose interest in activities you enjoy nearly every day for at least 2 weeks  You also have 4 or more of the following symptoms:  · You feel tired or have less energy than usual      · You feel unimportant or guilty most of the time  · You think about hurting or killing yourself  · Your appetite changes  You may lose your appetite and lose weight without trying  Your appetite may also increase and you may gain weight      · You are restless, irritable, or withdrawn  · You have trouble concentrating and remembering things  You have trouble doing daily tasks or making decisions  · You have trouble sleeping, even after the baby is asleep  How is postpartum depression treated? · Psychotherapy:  During therapy, you will talk with healthcare providers about how to cope with your feelings and moods  This can be done alone or in a group  It may also be done with family members or your partner  · Antidepressants: This medicine is given to decrease or stop the symptoms of depression  You usually need to take antidepressants for several weeks before you begin to feel better  Do not stop taking antidepressants unless your healthcare provider tells you to  Healthcare providers may try a different antidepressant if one type does not work  What can I do to feel better? · Rest:  Do not try to do everything all at the same time  Do only what is needed and let other things wait until later  Ask your family or friends for help, especially if you have other children  Ask your partner to help with night feedings or other baby care  Try to sleep when the baby naps  · Get emotional support:  Share your feelings with your partner, a friend, or another mother  · Take care of yourself:  Shower and dress each day  Do not skip meals  Try to get out of the house a little each day  Get regular exercise  Eat a healthy diet  Avoid alcohol because it can make your depression worse  Do not isolate yourself  Go for a walk or meet with a friend  It is also important that you have some time by yourself each day  How do I find support and more information? · [address] [place] [address], Public Information & Communication Branch  [address], [address], [address]  [place] [address]   Phone: [phone]  Phone: [phone]  Web Address: [facility] tn  When should I contact my healthcare provider?    · You cannot make it to your next visit  · Your depression does not get better with treatment or it gets worse  · You have questions or concerns about your condition or care  When should I seek immediate care or call 911? · You think about hurting or killing yourself, your baby, or someone else  · You feel like other people want to hurt you  · You hear voices telling you to hurt yourself or your baby  CARE AGREEMENT:   You have the right to help plan your care  Learn about your health condition and how it may be treated  Discuss treatment options with your caregivers to decide what care you want to receive  You always have the right to refuse treatment  The above information is an  only  It is not intended as medical advice for individual conditions or treatments  Talk to your doctor, nurse or pharmacist before following any medical regimen to see if it is safe and effective for you  © 2017 [address] Information is for End User's use only and may not be sold, redistributed or otherwise used for commercial purposes  All illustrations and images included in CareNotes® are the copyrighted property of A D A [employer]  or Emmanuel Higgins  Self Care After Delivery   AMBULATORY CARE:   The postpartum period  is the period of time from delivery to about 6 weeks  During this time you may experience many physical and emotional changes  It is important to understand what is normal and when you need to call your healthcare provider  It is also important to know how to care for yourself during this time  Call 911 for any of the following:   · You soak through 1 pad in 15 minutes, have blurry vision, clammy or pale skin, and feel faint  · You faint or lose consciousness  · Your heart is beating faster than normal      · You have trouble breathing  · You cough up blood    Seek care immediately if:   · You soak through 1 or more pads in an hour, or pass blood clots larger than a quarter from your vagina  · Your leg is painful, red, and larger than normal      · You have severe abdominal pain  · You have a bad headache or changes in your vision  · Your episiotomy or C section incision is red, swollen, bleeding, or draining pus  · Your incision comes apart  · You see or hear things that are not there, or have thoughts of harming yourself or your baby  Contact your obstetrician or midwife if:   · You have a fever  · You have new or worsening pain in your abdomen or vagina  · You continue to have the baby blues 10 days after you deliver  · You have trouble sleeping  · You have foul-smelling discharge from your vagina  · You have pain or burning when you urinate  · You do not have a bowel movement for 3 days or more  · You have nausea or are vomiting  · You have hard lumps or red streaks over your breasts  · You have cracked nipples or bleed from your nipples  · You have questions or concerns about your condition or care  Physical changes: The following are normal changes after you give birth:  · Pain in the area between your anus and vagina    · Breast pain    · Constipation or hemorrhoids    · Hot or cold flashes    · Vaginal bleeding or discharge    · Mild to moderate abdominal cramping    · Difficulty controlling bowel movements or urine  Emotional changes: The following are symptoms of the baby blues, or normal emotions after you give birth  The changes in your emotions may be caused by a drop in hormone levels after you deliver  If these symptoms last longer than 1 to 2 weeks after you give birth, you may have postpartum depression  · Feeling irritable    · Feeling sad    · Crying for no reason    · Feeling anxious  Breast care for nursing mothers: You may have sore breasts for 3 to 6 days after you give birth  This happens as your milk begins to fill your breasts   You may also have sore breasts if you do not breastfeed frequently  Do the following to care for your breasts:  · Apply a moist, warm, compress to your breast as directed  This may help soothe your breasts  Make sure the washcloth is not too hot before you apply it to your breast      · Nurse your baby or pump your milk frequently  This may prevent clogged milk ducts  Ask your healthcare provider how often to nurse or pump  · Massage your breasts as directed  This may help increase your milk flow  Gently rub your breasts in a circular motion before you breastfeed  You may need to gently squeeze your breast or nipple to help release milk  You can also use a breast pump to help release milk from your breast      · Wash your breasts with warm water only  Do not put soap on your nipples  Soap may cause your nipples to become dry  · Apply lanolin cream to your nipples as directed  Lanolin cream may add moisture to your skin and prevent nipple dryness  Always  wash off lanolin cream with warm water before you breastfeed  · Place pads in your bra  Your nipples may leak milk when you are not breastfeeding  You can place pads inside of your bra to help prevent leaking onto your clothing  Ask your healthcare provider where to purchase bra pads  · Get breastfeeding support if needed  There are healthcare providers who can answer questions about breastfeeding and provide you with support  Ask your healthcare provider who you can contact if you need breastfeeding support  Breast care for non-nursing mothers:  Milk will fill your breasts even if you bottle feed your baby  Do the following to help stop your milk from filling your breasts and causing pain:  · Wear a bra with support at all times  A sports bra or a tight-fitting bra will help stop your milk from coming in  · Apply ice on each breast for 15 to 20 minutes every hour or as directed  Use an ice pack, or put crushed ice in a plastic bag  Cover it with a towel   Ice helps your milk ducts shrink  · Keep your breasts away from warm water  Warm water will make it easier for milk to fill your breasts  Stand with your breasts away from warm water in the shower  · Limit how much you touch your breasts  This will prevent them from filling with milk  Perineum care: Your perineum is the area between your rectum and vagina  It is normal to have swelling and pain in this area after you give birth  If you had an episiotomy, your healthcare provider may give you special instructions  · Clean your perineum after you use the bathroom  This may prevent infection and help with healing  Use a spray bottle with warm water to clean your perineum  You may also gently spray warm water against your perineum when you urinate  Always wipe front to back  · Take a sitz bath as directed  A sitz bath may help relieve swelling and pain  Fill your bath tub or bucket with water up to your hips and sit in the water  Use cold water for 2 days after you deliver  Then use warm water  Ask your healthcare provider for more information about a sitz bath  · Apply ice packs for the first 24 hours or as directed  Use a plastic glove filled with ice or buy an ice pack  Wrap the ice pack or plastic glove in a small towel or wash cloth  Place the ice pack on your perineum for 20 minutes at a time  · Sit on a donut-shaped pillow  This may relieve pressure on your perineum when you sit  · Use wipes with medicine or take pills as directed  Your healthcare provider may tell you to use witch hazel pads  You can place witch hazel pads in the refrigerator before you apply them to your perineum  He may also tell you to take NSAIDs  Ask your healthcare provider how often to take pills or use wipes with medicine  · Do not go swimming or take tub baths for 4 to 6 weeks or as directed  This will help prevent an infection in your vagina or uterus  Bowel and bladder care:   It may take 3 to 5 days to have a bowel movement after you deliver your baby  You can do the following to prevent or manage constipation, and get control of your bowel or bladder:  · Take stool softeners as directed  A stool softener is medicine that will make your bowel movements softer  This may prevent or relieve constipation  A stool softener may also make bowel movements less painful  · Drink plenty of liquids  Ask how much liquid to drink each day and which liquids are best for you  Liquids may help prevent constipation  · Eat foods high in fiber  Examples include fruits, vegetables, grains, beans, and lentils  Ask your healthcare provider how much fiber you need each day  Fiber may prevent constipation  · Do Kegel exercises as directed  Kegel exercises will help strengthen the muscles that control bowel movements and urination  Ask your healthcare provider for more information on Kegel exercises  · Apply cold compresses or medicine to hemorrhoids as directed  This may relieve swelling and pain  Your healthcare provider may tell you to apply ice or wipes with medicine to your hemorrhoids  He may also tell you to use a sitz bath  Ask your healthcare for more information on how to manage hemorrhoids  Nutrition:  Good nutrition is important in the postpartum period  It will help you return to a healthy weight, increase your energy levels, and prevent constipation  It will also help you get enough nutrients and calories if you are going to breastfeed your baby  · Eat a variety of healthy foods  Healthy foods include fruits, vegetables, whole-grain breads, low-fat dairy products, beans, lean meats, and fish  You may need 500 to 700 additional calories each day if you breastfeed your baby  You may also need extra protein  · Limit foods with added sugar and high amounts of fat  These foods are high in calories and low in healthy nutrients  Read food labels so you know how much sugar and fat is in the food you want to eat  · Drink 8 to 10 glasses of water per day  Water will help you make plenty of milk for your baby  It will also help prevent constipation  Drink a glass of water every time you breastfeed your baby  · Take vitamins as directed  Ask your healthcare provider what vitamins you need  · Limit caffeine and alcohol if you are breastfeeding  Caffeine and alcohol can get into your breast milk  Caffeine and alcohol can make your baby fussy  They can also interfere with your baby's sleep  Ask your healthcare provider if you can drink alcohol or caffeine  Rest and sleep: You may feel very tired in the postpartum period  Enough sleep will help you heal and give you energy to care for your baby  The following may help you get sleep and rest:  · Nap when your baby naps  Your baby may nap several times during the day  Get rest during this time  · Limit visitors  Many people may want to see you and your baby  Ask friends or family to visit on different days  This will give you time to rest      · Do not plan too much for one day  Put off household chores so that you have time to rest  Gradually do more each day  · Ask for help from family, friends, or neighbors  Ask them to help you with laundry, cleaning, or errands  Also ask someone to watch the baby while you take a nap or relax  Ask your partner to help with the care of your baby  Pump some of your breast milk so your partner can feed your baby during the night  Exercise after delivery:  Wait until your healthcare provider says it is okay to exercise  Exercise can help you lose weight, increase your energy levels, and manage your mood  It can also prevent constipation and blood clots  Start with gentle exercises such as walking  Do more as you have more energy  You may need to avoid abdominal exercises for 1 to 2 weeks after you deliver  Talk to your healthcare provider about an exercise plan that is right for you     Sexual activity after delivery: · Do not have sex until your healthcare provider says it is okay  You may need to wait 4 to 6 weeks before you have sex  This may prevent infection and allow time to heal      · Your menstrual cycle may begin as soon as 3 weeks after you deliver  Your period may be delayed if you breastfeed your baby  You can become pregnant before you get your first postpartum period  Talk to your healthcare provider about birth control that is right for you  Some types of birth control are not safe during breastfeeding  For support and more information:  Join a support group for new mothers  Ask for help from family and friends with chores, errands, and care of your baby  · Office of Women's Health,  Department of Health and Human Services  [address], [address] [place] [address]  [address], [address] [place]  [place] , [address]  Phone: 2- 351 - [phone]  Web Address: www womenshealth gov  · [facility] [address] [place] [address] , [address] [place] [address]  [facility] , [address] [place] [address]  Web Address: [employer] be  [email or web address]/[email or web address]-care  aspx  Follow up with your obstetrician or midwife as directed: You will need to follow up with your healthcare provider in 2 to 6 weeks after delivery  Write down your questions so you remember to ask them at your visits  © 2017 [address] Information is for End User's use only and may not be sold, redistributed or otherwise used for commercial purposes  All illustrations and images included in CareNotes® are the copyrighted property of [employer]  or Emmanuel Higgins  The above information is an  only  It is not intended as medical advice for individual conditions or treatments  Talk to your doctor, nurse or pharmacist before following any medical regimen to see if it is safe and effective for you

## 2019-12-04 NOTE — LACTATION NOTE
This note was copied from a baby's chart  CONSULT - LACTATION  Baby Girl Rosmery Gallardo) Clauser 0 days female MRN: [id number]    [facility] Room / Bed: (N)/(N) Encounter: [id number]    Maternal Information     MOTHER:  Vidya Nur  Maternal Age: 30 y o    OB History: #: 1, Date: 03/29/15, Sex: Male, Weight: 4479 g (9 lb 14 oz), GA: 40w6d, Delivery: , Unspecified, Apgar1: None, Apgar5: None, Living: Living, Birth Comments: None    #: 2, Date: 17, Sex: Male, Weight: 4580 g (10 lb 1 6 oz), GA: 38w6d, Delivery: , Low Transverse, Apgar1: 9, Apgar5: 9, Living: Living, Birth Comments: None    #: 3, Date: 2018, Sex: None, Weight: None, GA: 8w0d, Delivery: None, Apgar1: None, Apgar5: None, Living: None, Birth Comments: None    #: 4, Date: 2018, Sex: None, Weight: None, GA: 8w0d, Delivery: None, Apgar1: None, Apgar5: None, Living: None, Birth Comments: None    #: 5, Date: 19, Sex: Female, Weight: 3649 g (8 lb 0 7 oz), GA: 38w5d, Delivery: , Low Transverse, Apgar1: 9, Apgar5: 9, Living: Living, Birth Comments: None   Previouse breast reduction surgery? No    Lactation history:   Has patient previously breast fed: Yes   How long had patient previously breast fed: 11 mo, 10 mo   Previous breast feeding complications:  Other (Comment)(each baby had lactose intolerance )     Past Surgical History:   Procedure Laterality Date    APPENDECTOMY       SECTION      COLPOSCOPY      [place]  DELIVERY ONLY N/A 2017    Procedure:  SECTION () REPEAT;  Surgeon: Miguel Robins MD;  Location: [facility] ;  Service: Obstetrics       Birth information:  YOB: 2017   Time of birth: 5:42 AM   Sex: female   Delivery type: , Low Transverse   Birth Weight: 3649 g (8 lb 0 7 oz)   Percent of Weight Change: 0%     Gestational Age: 44w7d   [unfilled]    Assessment     Breast and nipple assessment: normal assessment     Assessment: normal assessment    Feeding assessment: feeding well  LATCH:  Latch: Grasps breast, tongue down, lips flanged, rhythmic sucking   Audible Swallowing: Spontaneous and intermittent (24 hours old)   Type of Nipple: Everted (After stimulation)   Comfort (Breast/Nipple): Soft/non-tender   Hold (Positioning): No assist from staff, mother able to position/hold infant   LATCH Score: 10          Feeding recommendations:  breast feed on demand     Met with mother  Provided mother with Ready, Set, Baby booklet  Discussed Skin to Skin contact an benefits to mom and baby  Talked about the delay of the first bath until baby has adjusted  Spoke about the benefits of rooming in  Feeding on cue and what that means for recognizing infant's hunger  Avoidance of pacifiers for the first month discussed  Talked about exclusive breastfeeding for the first 6 months  Positioning and latch reviewed as well as showing images of other feeding positions  Discussed the properties of a good latch in any position  Reviewed hand/manual expression  Discussed s/s that baby is getting enough milk and some s/s that breastfeeding dyad may need further help  Gave information on common concerns, what to expect the first few weeks after delivery, preparing for other caregivers, and how partners can help  Resources for support also provided  Discussed 2nd night syndrome and ways to calm infant  Hand out given  Information on hand expression given  Discussed benefits of knowing how to manually express breast including stimulating milk supply, softening nipple for latch and evacuating breast in the event of engorgement  Baby is positioned in cross cradle hold on the R  Mom is pumping her left breast with a medela hand pump  Mom brought her own lactose free-formula to provide to infant if needed   Her other children were lactose intolerant and had to be switched/she followed an elimination diet throughout her experience BF each of them  She is very confident with her plan and feels she does not need assistance at this time  Ext provided and enc her to call for lactation support as needed throughout her stay       Allen England RN 12/4/2019 1:44 PM

## 2019-12-04 NOTE — PLAN OF CARE
Problem: PAIN - ADULT  Goal: Verbalizes/displays adequate comfort level or baseline comfort level  Description  Interventions:  - Encourage patient to monitor pain and request assistance  - Assess pain using appropriate pain scale  - Administer analgesics based on type and severity of pain and evaluate response  - Implement non-pharmacological measures as appropriate and evaluate response  - Consider cultural and social influences on pain and pain management  - Notify physician/advanced practitioner if interventions unsuccessful or patient reports new pain  Outcome: Progressing     Problem: INFECTION - ADULT  Goal: Absence or prevention of progression during hospitalization  Description  INTERVENTIONS:  - Assess and monitor for signs and symptoms of infection  - Monitor lab/diagnostic results  - Monitor all insertion sites, i e  indwelling lines, tubes, and drains  - Monitor endotracheal if appropriate and nasal secretions for changes in amount and color  - [place] appropriate cooling/warming therapies per order  - Administer medications as ordered  - Instruct and encourage patient and family to use good hand hygiene technique  - Identify and instruct in appropriate isolation precautions for identified infection/condition  Outcome: Progressing  Goal: Absence of fever/infection during neutropenic period  Description  INTERVENTIONS:  - Monitor WBC    Outcome: Progressing     Problem: SAFETY ADULT  Goal: Patient will remain free of falls  Description  INTERVENTIONS:  - Assess patient frequently for physical needs  -  Identify cognitive and physical deficits and behaviors that affect risk of falls    -  [place] fall precautions as indicated by assessment   - Educate patient/family on patient safety including physical limitations  - Instruct patient to call for assistance with activity based on assessment  - Modify environment to reduce risk of injury  - Consider OT/PT consult to assist with strengthening/mobility  Outcome: Progressing  Goal: Maintain or return to baseline ADL function  Description  INTERVENTIONS:  -  Assess patient's ability to carry out ADLs; assess patient's baseline for ADL function and identify physical deficits which impact ability to perform ADLs (bathing, care of mouth/teeth, toileting, grooming, dressing, etc )  - Assess/evaluate cause of self-care deficits   - Assess range of motion  - Assess patient's mobility; develop plan if impaired  - Assess patient's need for assistive devices and provide as appropriate  - Encourage maximum independence but intervene and supervise when necessary  - Involve family in performance of ADLs  - Assess for home care needs following discharge   - Consider OT consult to assist with ADL evaluation and planning for discharge  - Provide patient education as appropriate  Outcome: Progressing  Goal: Maintain or return mobility status to optimal level  Description  INTERVENTIONS:  - Assess patient's baseline mobility status (ambulation, transfers, stairs, etc )    - Identify cognitive and physical deficits and behaviors that affect mobility  - Identify mobility aids required to assist with transfers and/or ambulation (gait belt, sit-to-stand, lift, walker, cane, etc )  - [place] fall precautions as indicated by assessment  - Record patient progress and toleration of activity level on Mobility SBAR; progress patient to next Phase/Stage  - Instruct patient to call for assistance with activity based on assessment  - Consider rehabilitation consult to assist with strengthening/weightbearing, etc   Outcome: Progressing     Problem: Knowledge Deficit  Goal: Patient/family/caregiver demonstrates understanding of disease process, treatment plan, medications, and discharge instructions  Description  Complete learning assessment and assess knowledge base    Interventions:  - Provide teaching at level of understanding  - Provide teaching via preferred learning methods  Outcome: Progressing     Problem: DISCHARGE PLANNING  Goal: Discharge to home or other facility with appropriate resources  Description  INTERVENTIONS:  - Identify barriers to discharge w/patient and caregiver  - Arrange for needed discharge resources and transportation as appropriate  - Identify discharge learning needs (meds, wound care, etc )  - Arrange for interpretive services to assist at discharge as needed  - Refer to Case Management Department for coordinating discharge planning if the patient needs post-hospital services based on physician/advanced practitioner order or complex needs related to functional status, cognitive ability, or social support system  Outcome: Progressing     Problem: DISCHARGE PLANNING - CARE MANAGEMENT  Goal: Discharge to post-acute care or home with appropriate resources  Description  INTERVENTIONS:  - Conduct assessment to determine patient/family and health care team treatment goals, and need for post-acute services based on payer coverage, community resources, and patient preferences, and barriers to discharge  - Address psychosocial, clinical, and financial barriers to discharge as identified in assessment in conjunction with the patient/family and health care team  - Arrange appropriate level of post-acute services according to patients   needs and preference and payer coverage in collaboration with the physician and health care team  - Communicate with and update the patient/family, physician, and health care team regarding progress on the discharge plan  - Arrange appropriate transportation to post-acute venues  Outcome: Progressing     Problem: BIRTH - VAGINAL/ SECTION  Goal: Fetal and maternal status remain reassuring during the birth process  Description  INTERVENTIONS:  - Monitor vital signs  - Monitor fetal heart rate  - Monitor uterine activity  - Monitor labor progression (vaginal delivery)  - DVT prophylaxis  - Antibiotic prophylaxis  Outcome: Progressing  Goal: Emotionally satisfying birthing experience for mother/fetus  Description  Interventions:  - Assess, plan, implement and evaluate the nursing care given to the patient in labor  - Advocate the philosophy that each childbirth experience is a unique experience and support the family's chosen level of involvement and control during the labor process   - Actively participate in both the patient's and family's teaching of the birth process  - Consider cultural, [place] and age-specific factors and plan care for the patient in labor  Outcome: Progressing     Problem: ALTERATION IN THE BREAST  Goal: Optimize infant feeding at the breast  Description  INTERVENTIONS:  - Latch, breast and nipple assessment  - Assess prior breast feeding history  - Hand expression of breast milk  - For cracked, bleeding and or sore nipples reassess latch, treat damaged nipple  -Educate mother on feeding cues  -Positioning/latch techniques  Outcome: Progressing     Problem: INADEQUATE LATCH, SUCK OR SWALLOW  Goal: Demonstrate ability to latch and sustain latch, audible swallowing and satiety  Description  INTERVENTIONS:  - Assess oral anatomy, notify Physician/AP for abnormal findings  - Establish milk expression  - Maximize feeding opportunity (skin to skin, behavioral state)  - Position/latch techniques  - Discourage use of pacifier-artificial nipple  - Mechanical pumping  - Nipple Shield  - Supplemental formula feeding (Physician/AP order)  - Alternative feeding method  Outcome: Progressing

## 2019-12-04 NOTE — ANESTHESIA POSTPROCEDURE EVALUATION
Post-Op Assessment Note    CV Status:  Stable  Pain Score: 1    Pain management: adequate     Mental Status:  Alert   PONV Controlled:  Controlled   Airway Patency:  Patent    Staff: Anesthesiologist, CRNA           BP      Temp      Pulse     Resp      SpO2

## 2019-12-04 NOTE — PROGRESS NOTES
Triage Note - OB  Abdiel Trevino 30 y o  female MRN: [id number]  Unit/Bed#: LD Triage  Encounter: [id number]    Chief Complaint:   Chief Complaint   Patient presents with    Contractions     patient reports cierra every 5 minutes starting at 1630     MARTINEZ: Estimated Date of Delivery: 19    HPI: 30 y o  female [id number] at 45 5/7 weeks presents to triage with a chief complaint of uterine contractions  Occurring every 4-5min  Lasting about 30 secons  Denies LOF, or VB  Notes adequate FM  Obstetrical complications include W0UNX on Insulin and Obesity  Last U/S on 19 showing vertex presentation with anterior placenta  GBS is negative  Notes she is scheduled for a planned  on   States last cervical check was about a week ago  States she was fingertip  FHR: 140 Cat1  Diaz: q4min    Vitals: Blood pressure 122/78, pulse 91, temperature 97 8 °F (36 6 °C), temperature source Oral, resp  rate 18, height 5' 4" (1 626 m), weight 88 5 kg (195 lb), last menstrual period 2019, SpO2 98 %, not currently breastfeeding  ,Body mass index is 33 47 kg/m²  Physical Exam  GEN: well developed and well nourished, alert, oriented times 3 and appears comfortable  Heart: Regular rate and rhythm, S1S2 present or without murmur or extra heart sounds   Lungs: clear to auscultation bilaterally, no wheezes, no rhonchi and normal symmetric air entry  Abd: soft, non tender, positive bowel sounds and gravid  SVE: /-2    Labs:   No visits with results within 1 Day(s) from this visit  Latest known visit with results is:   Routine Prenatal on 2019   Component Date Value    POCT URINE PROTEIN 2019 neg     GLUCOSE, UA 2019 neg        Lab, Imaging and other studies: I have personally reviewed pertinent reports      A/P:    [id number] @ 38 5/7 weeks presents with uterine contractions  · Diaz: q4min  · SVE:  / -2   · 1 week prior: 0 5 50/ -4  · Will start NS 125cc/hr  · Repeat Cervical Exam in 2hrs    Discharge instructions given to patient and labor precautions reviewed    Plan of care discussed and agreed upon with Dr Eduardo Rehman, Dr Ame Reilly, and Dr John Payne

## 2019-12-04 NOTE — PROGRESS NOTES
Stacey Steinberg  [id number]  12/4/2019  2:37 PM    POST-OP CHECK     S:  Stacey Steinberg doing well  Pain controlled  Denies nausea/vomiting  Denies chest pain, shortness of breath  No complaints at this time  O:  Vitals:    12/04/19 1012   BP: 111/58   Pulse: 66   Resp: 16   Temp: (!) 97 4 °F (36 3 °C)   SpO2: 96%       Physical Exam  NAD  Breathing comfortably on room air  Abdomen soft, nontender, nondistended  Uterine fundus firm, nontender, at  the umbilicus  Incision c/d/i, no erythema or drainage  Leroy in place, draining clear urine  WWP, intact distal pulses      A:  Stacey Steinberg is s/p RLTCS     P:  Routine postop check  IV/PO pain meds as needed  Regular diet as tolerated  Antiemetics for nausea/vomiting prn  Follow up am labs  Adequate UOP (64cc/hr), continue to monitor, will plan to remove Leroy now  SCDs for DVT ppx, encourage ambulation as tolerated      Shavon Pederson MD  OB/GYN  12/4/2019  2:37 PM

## 2019-12-04 NOTE — ANESTHESIA PREPROCEDURE EVALUATION
Review of Systems/Medical History    Chart reviewed  No history of anesthetic complications     Cardiovascular  Negative cardio ROS Exercise tolerance (METS): good,     Pulmonary  Negative pulmonary ROS        GI/Hepatic  Negative GI/hepatic ROS          Negative  ROS        Endo/Other  Negative endo/other ROS      GYN  Currently pregnant ,          Hematology  Negative hematology ROS      Musculoskeletal  Negative musculoskeletal ROS        Neurology  Negative neurology ROS      Psychology   Negative psychology ROS              Physical Exam    Airway    Mallampati score: I  TM Distance: >3 FB  Neck ROM: full     Dental   No notable dental hx     Cardiovascular  Comment: Negative ROS,     Pulmonary      Other Findings        Anesthesia Plan  ASA Score- 2     Anesthesia Type- spinal with ASA Monitors  Additional Monitors:   Airway Plan:     Comment: Spinal, duramorph  GA back  Std ASA mon  Risks discussed - infection/bleeding, PDPH, neurological compromise; no bleeding disorders, no blood thinners  Plan Factors-    Induction- intravenous  Postoperative Plan-     Informed Consent- Anesthetic plan and risks discussed with patient  I personally reviewed this patient with the CRNA  Discussed and agreed on the Anesthesia Plan with the CRNA  Jair Chopra

## 2019-12-04 NOTE — OP NOTE
Operative Report - OB/GYN   Demarcus Frost 30 y o  female MRN: [id number]  Unit/Bed#: LD PACU- Encounter: [id number]    Indications: Prior  section in labor    Pre-operative Diagnosis:   1  Pregnancy at 38w5d  2  Hx of prior  section x2  3  Labor  4  Obesity  5  A2GDM    Post-operative Diagnosis: same, delivered    Surgeon: Talia Hodges MD    Assistant(s): Gina Raymundo MD    Findings:  ID Type Source Tests Collected by Time Destination   A :  Cord Blood Cord BLOOD GAS, VENOUS, CORD, BLOOD GAS, ARTERIAL, CORD Agustín Arenas MD 2019 0543    B :  Tissue (Placenta on Hold) OB Only Placenta PLACENTA IN STORAGE Agustín Arenas MD 2019 0543      Blood gas, venous, cord    Collection Time: 19  5:43 AM   Result Value Ref Range    pH, Cord Mason 7 337 7 190 - 7 490    pCO2, Cord Mason 43 2 (H) 27 0 - 43 0 mm HG    pO2, Cord Mason 26 5 15 0 - 45 0 mm HG    HCO3, Cord Mason 22 6 12 2 - 28 6 mmol/L    Base Exc, Cord Mason -3 2 (L) 1 0 - 9 0 mmol/L    O2 Cont, Cord Mason 14 6 mL/dL    O2 HGB,VENOUS CORD 63 0 %   Blood gas, arterial, cord    Collection Time: 19  5:43 AM   Result Value Ref Range    pH, Cord Art 7 290 7 230 - 7 430    pCO2, Cord Art 52 2 30 0 - 60 0    pO2, Cord Art 11 9 5 0 - 25 0 mm HG    HCO3, Cord Art 24 5 17 3 - 27 3 mmol/L    Base Exc, Cord Art -2 8 (L) 3 0 - 11 0 mmol/L    O2 Content, Cord Art 4 1 ml/dl    O2 Hgb, Arterial Cord 18 3 %       Specimens:   1  Arterial and venous cord gases  2  Cord blood  3  Segment of umbilical cord  4  Placenta to storage     Estimated Blood Loss:  566 mL           Total IV Fluids: 1500mL    Drains:   Urethral Catheter Non-latex 16 Fr  (Active)   Site Assessment Skin intact; Clean 2019  5:31 AM   Securement Method Securing device (Describe) 2019  5:31 AM   Number of days: 0           Complications:  None; patient tolerated the procedure well             Disposition: PACU            Condition: stable    Operative Technique:   Kamryn Soria is a 30 y o  [id number] at 38w5d who was initially evaluated for rule out labor  She was noted to make cervical change from 1 to 3 cm in 2 hours  Decision was made to proceed with repeat low-transverse  section  Patient was made aware of these findings and the proposed plan  Risks, benefits, possible complications, alternate treatment options, and expected outcomes were discussed with the patient  The patient agreed with the proposed plan and gave informed consent  The patient was taken to the operating room where she was properly identified to the OR staff and attending physician  She received spinal anesthesia intraoperatively  Fetal heart tones were appreciated and found to be appropriate  A Leroy catheter and SCDs were then placed  The vagina was prepped with Betadine and the abdomen was prepped with Chloraprep  Following appropriate drying time, the patient was draped in the usual sterile manner for a Pfannenstiel incision  The patient received Ancef 2g the and 500 mg azithromycin IV pre-operatively for prophylaxis  A Time Out was held and the above information confirmed  The patient was identified as Kamryn Soria and the procedure verified as  Delivery  A Pfannenstiel incision was made and carried down through the underlying subcutaneous tissue to the fascia using a scalpel  Rectus fascia was then incised  We then proceeded in Hal-Weeks fashion  All anatomic layers were well-demarcated  The rectus muscles were  and the peritoneum was identified, entered, and extended longitudinally with blunt dissection  The bladder blade was inserted  A low transverse uterine incision was made with the scalpel and extended laterally with blunt dissection  The amnion was entered sharply    Surgeons hand was inserted through the hysterotomy and the fetal head was palpated, elevated, and delivered through the uterine incision with the assistance of fundal pressure  Baby had spontaneous cry with good color and tone  The umbilical cord was clamped and cut  The infant was handed off to the  providers  Arterial and venous cord gases, cord blood, and a segment of umbilical cord were obtained for evaluation and promptly sent to the lab  The placenta delivered spontaneously with uterine fundal massage and was noted to have a centrally inserted 3 vessel cord  This was also sent to the lab for placental pathology  The uterus was exteriorized and a moist lap sponge was used to clear the cavity of clots and products of conception  The uterine incision was closed with a running locked suture of 0 Vicryl  A second layer of the same suture was used to imbricate the first   Good hemostasis was confirmed upon uterine closure  Warmed normal saline solution was used to irrigate the posterior culdesac and the uterus was returned to the abdomen  The paracolic gutters were inspected and cleared of all clots and debris with irrigation and moist lap sponges  The fascia was closed with a running suture of 0 Vicryl  Subcutaneous tissues were closed with 2-0 Plain suture  The skin was closed with 4-0 Monocryl in a subcuticular fashion  Sterile dressing was applied and an abdominal binder was then placed  At the conclusion of the procedure, all needle, sponge, and instrument counts were noted to be correct x2  The patient tolerated the procedure well and was transferred to her the recovery room in stable condition  Dr Herb Hui was present and participated in all key portions of the case      SIGNATURE: Kalin Guy MD  DATE: 2019  TIME: 6:25 AM

## 2019-12-04 NOTE — PLAN OF CARE
Problem: PAIN - ADULT  Goal: Verbalizes/displays adequate comfort level or baseline comfort level  Description  Interventions:  - Encourage patient to monitor pain and request assistance  - Assess pain using appropriate pain scale  - Administer analgesics based on type and severity of pain and evaluate response  - Implement non-pharmacological measures as appropriate and evaluate response  - Consider cultural and social influences on pain and pain management  - Notify physician/advanced practitioner if interventions unsuccessful or patient reports new pain  Outcome: Progressing     Problem: INFECTION - ADULT  Goal: Absence or prevention of progression during hospitalization  Description  INTERVENTIONS:  - Assess and monitor for signs and symptoms of infection  - Monitor lab/diagnostic results  - Monitor all insertion sites, i e  indwelling lines, tubes, and drains  - Monitor endotracheal if appropriate and nasal secretions for changes in amount and color  - [place] appropriate cooling/warming therapies per order  - Administer medications as ordered  - Instruct and encourage patient and family to use good hand hygiene technique  - Identify and instruct in appropriate isolation precautions for identified infection/condition  Outcome: Progressing  Goal: Absence of fever/infection during neutropenic period  Description  INTERVENTIONS:  - Monitor WBC    Outcome: Progressing     Problem: SAFETY ADULT  Goal: Patient will remain free of falls  Description  INTERVENTIONS:  - Assess patient frequently for physical needs  -  Identify cognitive and physical deficits and behaviors that affect risk of falls    -  [place] fall precautions as indicated by assessment   - Educate patient/family on patient safety including physical limitations  - Instruct patient to call for assistance with activity based on assessment  - Modify environment to reduce risk of injury  - Consider OT/PT consult to assist with strengthening/mobility  Outcome: Progressing  Goal: Maintain or return to baseline ADL function  Description  INTERVENTIONS:  -  Assess patient's ability to carry out ADLs; assess patient's baseline for ADL function and identify physical deficits which impact ability to perform ADLs (bathing, care of mouth/teeth, toileting, grooming, dressing, etc )  - Assess/evaluate cause of self-care deficits   - Assess range of motion  - Assess patient's mobility; develop plan if impaired  - Assess patient's need for assistive devices and provide as appropriate  - Encourage maximum independence but intervene and supervise when necessary  - Involve family in performance of ADLs  - Assess for home care needs following discharge   - Consider OT consult to assist with ADL evaluation and planning for discharge  - Provide patient education as appropriate  Outcome: Progressing  Goal: Maintain or return mobility status to optimal level  Description  INTERVENTIONS:  - Assess patient's baseline mobility status (ambulation, transfers, stairs, etc )    - Identify cognitive and physical deficits and behaviors that affect mobility  - Identify mobility aids required to assist with transfers and/or ambulation (gait belt, sit-to-stand, lift, walker, cane, etc )  - [place] fall precautions as indicated by assessment  - Record patient progress and toleration of activity level on Mobility SBAR; progress patient to next Phase/Stage  - Instruct patient to call for assistance with activity based on assessment  - Consider rehabilitation consult to assist with strengthening/weightbearing, etc   Outcome: Progressing     Problem: Knowledge Deficit  Goal: Patient/family/caregiver demonstrates understanding of disease process, treatment plan, medications, and discharge instructions  Description  Complete learning assessment and assess knowledge base    Interventions:  - Provide teaching at level of understanding  - Provide teaching via preferred learning methods  Outcome: Progressing     Problem: DISCHARGE PLANNING  Goal: Discharge to home or other facility with appropriate resources  Description  INTERVENTIONS:  - Identify barriers to discharge w/patient and caregiver  - Arrange for needed discharge resources and transportation as appropriate  - Identify discharge learning needs (meds, wound care, etc )  - Arrange for interpretive services to assist at discharge as needed  - Refer to Case Management Department for coordinating discharge planning if the patient needs post-hospital services based on physician/advanced practitioner order or complex needs related to functional status, cognitive ability, or social support system  Outcome: Progressing     Problem: DISCHARGE PLANNING - CARE MANAGEMENT  Goal: Discharge to post-acute care or home with appropriate resources  Description  INTERVENTIONS:  - Conduct assessment to determine patient/family and health care team treatment goals, and need for post-acute services based on payer coverage, community resources, and patient preferences, and barriers to discharge  - Address psychosocial, clinical, and financial barriers to discharge as identified in assessment in conjunction with the patient/family and health care team  - Arrange appropriate level of post-acute services according to patients   needs and preference and payer coverage in collaboration with the physician and health care team  - Communicate with and update the patient/family, physician, and health care team regarding progress on the discharge plan  - Arrange appropriate transportation to post-acute venues  Outcome: Progressing     Problem: ALTERATION IN THE BREAST  Goal: Optimize infant feeding at the breast  Description  INTERVENTIONS:  - Latch, breast and nipple assessment  - Assess prior breast feeding history  - Hand expression of breast milk  - For cracked, bleeding and or sore nipples reassess latch, treat damaged nipple  -Educate mother on feeding cues  -Positioning/latch techniques  Outcome: Progressing     Problem: INADEQUATE LATCH, SUCK OR SWALLOW  Goal: Demonstrate ability to latch and sustain latch, audible swallowing and satiety  Description  INTERVENTIONS:  - Assess oral anatomy, notify Physician/AP for abnormal findings  - Establish milk expression  - Maximize feeding opportunity (skin to skin, behavioral state)  - Position/latch techniques  - Discourage use of pacifier-artificial nipple  - Mechanical pumping  - Nipple Shield  - Supplemental formula feeding (Physician/AP order)  - Alternative feeding method  Outcome: Progressing     Problem: POSTPARTUM  Goal: Experiences normal postpartum course  Description  INTERVENTIONS:  - Monitor maternal vital signs  - Assess uterine involution and lochia  Outcome: Progressing  Goal: Appropriate maternal -  bonding  Description  INTERVENTIONS:  - Identify family support  - Assess for appropriate maternal/infant bonding   -Encourage maternal/infant bonding opportunities  - Referral to  or  as needed  Outcome: Progressing  Goal: Establishment of infant feeding pattern  Description  INTERVENTIONS:  - Assess breast/bottle feeding  - Refer to lactation as needed  Outcome: Progressing  Goal: Incision(s), wounds(s) or drain site(s) healing without S/S of infection  Description  INTERVENTIONS  - Assess and document risk factors for skin impairment   - Assess and document dressing, incision, wound bed, drain sites and surrounding tissue  - Consider nutrition services referral as needed  - Oral mucous membranes remain intact  - Provide patient/ family education  Outcome: Progressing

## 2019-12-04 NOTE — PLAN OF CARE
Problem: PAIN - ADULT  Goal: Verbalizes/displays adequate comfort level or baseline comfort level  Description  Interventions:  - Encourage patient to monitor pain and request assistance  - Assess pain using appropriate pain scale  - Administer analgesics based on type and severity of pain and evaluate response  - Implement non-pharmacological measures as appropriate and evaluate response  - Consider cultural and social influences on pain and pain management  - Notify physician/advanced practitioner if interventions unsuccessful or patient reports new pain  Outcome: Progressing     Problem: INFECTION - ADULT  Goal: Absence or prevention of progression during hospitalization  Description  INTERVENTIONS:  - Assess and monitor for signs and symptoms of infection  - Monitor lab/diagnostic results  - Monitor all insertion sites, i e  indwelling lines, tubes, and drains  - Monitor endotracheal if appropriate and nasal secretions for changes in amount and color  - [place] appropriate cooling/warming therapies per order  - Administer medications as ordered  - Instruct and encourage patient and family to use good hand hygiene technique  - Identify and instruct in appropriate isolation precautions for identified infection/condition  Outcome: Progressing  Goal: Absence of fever/infection during neutropenic period  Description  INTERVENTIONS:  - Monitor WBC    Outcome: Progressing     Problem: SAFETY ADULT  Goal: Patient will remain free of falls  Description  INTERVENTIONS:  - Assess patient frequently for physical needs  -  Identify cognitive and physical deficits and behaviors that affect risk of falls    -  [place] fall precautions as indicated by assessment   - Educate patient/family on patient safety including physical limitations  - Instruct patient to call for assistance with activity based on assessment  - Modify environment to reduce risk of injury  - Consider OT/PT consult to assist with strengthening/mobility  Outcome: Progressing  Goal: Maintain or return to baseline ADL function  Description  INTERVENTIONS:  -  Assess patient's ability to carry out ADLs; assess patient's baseline for ADL function and identify physical deficits which impact ability to perform ADLs (bathing, care of mouth/teeth, toileting, grooming, dressing, etc )  - Assess/evaluate cause of self-care deficits   - Assess range of motion  - Assess patient's mobility; develop plan if impaired  - Assess patient's need for assistive devices and provide as appropriate  - Encourage maximum independence but intervene and supervise when necessary  - Involve family in performance of ADLs  - Assess for home care needs following discharge   - Consider OT consult to assist with ADL evaluation and planning for discharge  - Provide patient education as appropriate  Outcome: Progressing  Goal: Maintain or return mobility status to optimal level  Description  INTERVENTIONS:  - Assess patient's baseline mobility status (ambulation, transfers, stairs, etc )    - Identify cognitive and physical deficits and behaviors that affect mobility  - Identify mobility aids required to assist with transfers and/or ambulation (gait belt, sit-to-stand, lift, walker, cane, etc )  - [place] fall precautions as indicated by assessment  - Record patient progress and toleration of activity level on Mobility SBAR; progress patient to next Phase/Stage  - Instruct patient to call for assistance with activity based on assessment  - Consider rehabilitation consult to assist with strengthening/weightbearing, etc   Outcome: Progressing     Problem: Knowledge Deficit  Goal: Patient/family/caregiver demonstrates understanding of disease process, treatment plan, medications, and discharge instructions  Description  Complete learning assessment and assess knowledge base    Interventions:  - Provide teaching at level of understanding  - Provide teaching via preferred learning methods  Outcome: Progressing     Problem: DISCHARGE PLANNING  Goal: Discharge to home or other facility with appropriate resources  Description  INTERVENTIONS:  - Identify barriers to discharge w/patient and caregiver  - Arrange for needed discharge resources and transportation as appropriate  - Identify discharge learning needs (meds, wound care, etc )  - Arrange for interpretive services to assist at discharge as needed  - Refer to Case Management Department for coordinating discharge planning if the patient needs post-hospital services based on physician/advanced practitioner order or complex needs related to functional status, cognitive ability, or social support system  Outcome: Progressing     Problem: DISCHARGE PLANNING - CARE MANAGEMENT  Goal: Discharge to post-acute care or home with appropriate resources  Description  INTERVENTIONS:  - Conduct assessment to determine patient/family and health care team treatment goals, and need for post-acute services based on payer coverage, community resources, and patient preferences, and barriers to discharge  - Address psychosocial, clinical, and financial barriers to discharge as identified in assessment in conjunction with the patient/family and health care team  - Arrange appropriate level of post-acute services according to patients   needs and preference and payer coverage in collaboration with the physician and health care team  - Communicate with and update the patient/family, physician, and health care team regarding progress on the discharge plan  - Arrange appropriate transportation to post-acute venues  Outcome: Progressing     Problem: ALTERATION IN THE BREAST  Goal: Optimize infant feeding at the breast  Description  INTERVENTIONS:  - Latch, breast and nipple assessment  - Assess prior breast feeding history  - Hand expression of breast milk  - For cracked, bleeding and or sore nipples reassess latch, treat damaged nipple  -Educate mother on feeding cues  -Positioning/latch techniques  Outcome: Progressing     Problem: INADEQUATE LATCH, SUCK OR SWALLOW  Goal: Demonstrate ability to latch and sustain latch, audible swallowing and satiety  Description  INTERVENTIONS:  - Assess oral anatomy, notify Physician/AP for abnormal findings  - Establish milk expression  - Maximize feeding opportunity (skin to skin, behavioral state)  - Position/latch techniques  - Discourage use of pacifier-artificial nipple  - Mechanical pumping  - Nipple Shield  - Supplemental formula feeding (Physician/AP order)  - Alternative feeding method  Outcome: Progressing     Problem: POSTPARTUM  Goal: Experiences normal postpartum course  Description  INTERVENTIONS:  - Monitor maternal vital signs  - Assess uterine involution and lochia  Outcome: Progressing  Goal: Appropriate maternal -  bonding  Description  INTERVENTIONS:  - Identify family support  - Assess for appropriate maternal/infant bonding   -Encourage maternal/infant bonding opportunities  - Referral to  or  as needed  Outcome: Progressing  Goal: Establishment of infant feeding pattern  Description  INTERVENTIONS:  - Assess breast/bottle feeding  - Refer to lactation as needed  Outcome: Progressing  Goal: Incision(s), wounds(s) or drain site(s) healing without S/S of infection  Description  INTERVENTIONS  - Assess and document risk factors for skin impairment   - Assess and document dressing, incision, wound bed, drain sites and surrounding tissue  - Consider nutrition services referral as needed  - Oral mucous membranes remain intact  - Provide patient/ family education  Outcome: Progressing

## 2019-12-05 VITALS
DIASTOLIC BLOOD PRESSURE: 75 MMHG | HEART RATE: 75 BPM | BODY MASS INDEX: 33.29 KG/M2 | OXYGEN SATURATION: 97 % | WEIGHT: 195 LBS | TEMPERATURE: 98.5 F | HEIGHT: 64 IN | SYSTOLIC BLOOD PRESSURE: 127 MMHG | RESPIRATION RATE: 18 BRPM

## 2019-12-05 LAB
BASOPHILS # BLD AUTO: 0.04 THOUSANDS/ΜL (ref 0–0.1)
BASOPHILS NFR BLD AUTO: 0 % (ref 0–1)
EOSINOPHIL # BLD AUTO: 0.07 THOUSAND/ΜL (ref 0–0.61)
EOSINOPHIL NFR BLD AUTO: 1 % (ref 0–6)
ERYTHROCYTE [DISTWIDTH] IN BLOOD BY AUTOMATED COUNT: 20.3 % (ref 11.6–15.1)
HCT VFR BLD AUTO: 36.3 % (ref 34.8–46.1)
HGB BLD-MCNC: 11.3 G/DL (ref 11.5–15.4)
IMM GRANULOCYTES # BLD AUTO: 0.05 THOUSAND/UL (ref 0–0.2)
IMM GRANULOCYTES NFR BLD AUTO: 0 % (ref 0–2)
LYMPHOCYTES # BLD AUTO: 2.2 THOUSANDS/ΜL (ref 0.6–4.47)
LYMPHOCYTES NFR BLD AUTO: 19 % (ref 14–44)
MCH RBC QN AUTO: 27 PG (ref 26.8–34.3)
MCHC RBC AUTO-ENTMCNC: 31.1 G/DL (ref 31.4–37.4)
MCV RBC AUTO: 87 FL (ref 82–98)
MONOCYTES # BLD AUTO: 0.7 THOUSAND/ΜL (ref 0.17–1.22)
MONOCYTES NFR BLD AUTO: 6 % (ref 4–12)
NEUTROPHILS # BLD AUTO: 8.73 THOUSANDS/ΜL (ref 1.85–7.62)
NEUTS SEG NFR BLD AUTO: 74 % (ref 43–75)
NRBC BLD AUTO-RTO: 0 /100 WBCS
PLATELET # BLD AUTO: 192 THOUSANDS/UL (ref 149–390)
PMV BLD AUTO: 10.9 FL (ref 8.9–12.7)
RBC # BLD AUTO: 4.18 MILLION/UL (ref 3.81–5.12)
WBC # BLD AUTO: 11.79 THOUSAND/UL (ref 4.31–10.16)

## 2019-12-05 PROCEDURE — 99024 POSTOP FOLLOW-UP VISIT: CPT | Performed by: STUDENT IN AN ORGANIZED HEALTH CARE EDUCATION/TRAINING PROGRAM

## 2019-12-05 PROCEDURE — 85025 COMPLETE CBC W/AUTO DIFF WBC: CPT | Performed by: OBSTETRICS & GYNECOLOGY

## 2019-12-05 RX ORDER — IBUPROFEN 600 MG/1
600 TABLET ORAL EVERY 6 HOURS PRN
Status: DISCONTINUED | OUTPATIENT
Start: 2019-12-05 | End: 2019-12-05 | Stop reason: HOSPADM

## 2019-12-05 RX ORDER — IBUPROFEN 200 MG
600 TABLET ORAL EVERY 6 HOURS PRN
Refills: 0
Start: 2019-12-05 | End: 2019-12-31 | Stop reason: ALTCHOICE

## 2019-12-05 RX ORDER — IBUPROFEN 600 MG/1
600 TABLET ORAL EVERY 6 HOURS PRN
Status: DISCONTINUED | OUTPATIENT
Start: 2019-12-05 | End: 2019-12-05

## 2019-12-05 RX ORDER — OXYCODONE HYDROCHLORIDE 5 MG/1
5 TABLET ORAL EVERY 4 HOURS PRN
Qty: 5 TABLET | Refills: 0
Start: 2019-12-05 | End: 2019-12-15

## 2019-12-05 RX ORDER — ACETAMINOPHEN 325 MG/1
975 TABLET ORAL EVERY 8 HOURS SCHEDULED
Qty: 30 TABLET | Refills: 0
Start: 2019-12-05 | End: 2019-12-31 | Stop reason: ALTCHOICE

## 2019-12-05 RX ADMIN — IBUPROFEN 600 MG: 600 TABLET ORAL at 00:04

## 2019-12-05 RX ADMIN — ACETAMINOPHEN 975 MG: 325 TABLET ORAL at 06:03

## 2019-12-05 RX ADMIN — IBUPROFEN 600 MG: 600 TABLET ORAL at 11:04

## 2019-12-05 NOTE — PROGRESS NOTES
Progress Note - OB/GYN   Marjorie Leaver 30 y o  female MRN: [id number]  Unit/Bed#: -01 Encounter: [id number]    Assessment:  Post partum Day #1 s/p RLTCS, stable, baby in room with parents    Plan:  1  Post partum   - Continue routine post partum care  - Encourage ambulation  - Encourage breastfeeding  - Voiding freely  - Hgb 12 5 ->11 3    2  Discharge planning  - Anticipate discharge today per patient preference if stable       Subjective/Objective   Chief Complaint:     Post delivery  Patient is doing well  Lochia WNL  Pain well controlled  Subjective: This morning, she has no complaints  Ambulating, voiding freely, passing gas, requesting early discharge  Pain: yes, minimal cramping, improved with meds  Tolerating PO: yes  Voiding: yes  Flatus: yes  BM: no  Ambulating: yes  Breastfeeding:  yes  Chest pain: no  Shortness of breath: no  Leg pain: no  Lochia: minimal    Objective:     Vitals: /76   Pulse 65   Temp (!) 97 4 °F (36 3 °C) (Oral)   Resp 16   Ht 5' 4" (1 626 m)   Wt 88 5 kg (195 lb)   LMP 03/08/2019   SpO2 96%   Breastfeeding?  Yes   BMI 33 47 kg/m²       Intake/Output Summary (Last 24 hours) at 12/5/2019 0614  Last data filed at 12/4/2019 2015  Gross per 24 hour   Intake 500 ml   Output 2708 ml   Net -2208 ml       Lab Results   Component Value Date    WBC 11 79 (H) 12/05/2019    HGB 11 3 (L) 12/05/2019    HCT 36 3 12/05/2019    MCV 87 12/05/2019     12/05/2019       Physical Exam:   Physical Exam  NAD  Breathing comfortably on room air  Abdomen soft, nontender, nondistended  Uterine fundus firm, nontender, at the umbilicus  Incision c/d/i, no erythema or drainage  WWP, intact distal pulses      Maira Corcoran MD  12/5/2019  6:14 AM

## 2019-12-05 NOTE — PLAN OF CARE
Problem: PAIN - ADULT  Goal: Verbalizes/displays adequate comfort level or baseline comfort level  Description  Interventions:  - Encourage patient to monitor pain and request assistance  - Assess pain using appropriate pain scale  - Administer analgesics based on type and severity of pain and evaluate response  - Implement non-pharmacological measures as appropriate and evaluate response  - Consider cultural and social influences on pain and pain management  - Notify physician/advanced practitioner if interventions unsuccessful or patient reports new pain  Outcome: Progressing     Problem: INFECTION - ADULT  Goal: Absence or prevention of progression during hospitalization  Description  INTERVENTIONS:  - Assess and monitor for signs and symptoms of infection  - Monitor lab/diagnostic results  - Monitor all insertion sites, i e  indwelling lines, tubes, and drains  - Monitor endotracheal if appropriate and nasal secretions for changes in amount and color  - [place] appropriate cooling/warming therapies per order  - Administer medications as ordered  - Instruct and encourage patient and family to use good hand hygiene technique  - Identify and instruct in appropriate isolation precautions for identified infection/condition  Outcome: Progressing  Goal: Absence of fever/infection during neutropenic period  Description  INTERVENTIONS:  - Monitor WBC    Outcome: Progressing     Problem: SAFETY ADULT  Goal: Patient will remain free of falls  Description  INTERVENTIONS:  - Assess patient frequently for physical needs  -  Identify cognitive and physical deficits and behaviors that affect risk of falls    -  [place] fall precautions as indicated by assessment   - Educate patient/family on patient safety including physical limitations  - Instruct patient to call for assistance with activity based on assessment  - Modify environment to reduce risk of injury  - Consider OT/PT consult to assist with strengthening/mobility  Outcome: Progressing  Goal: Maintain or return to baseline ADL function  Description  INTERVENTIONS:  -  Assess patient's ability to carry out ADLs; assess patient's baseline for ADL function and identify physical deficits which impact ability to perform ADLs (bathing, care of mouth/teeth, toileting, grooming, dressing, etc )  - Assess/evaluate cause of self-care deficits   - Assess range of motion  - Assess patient's mobility; develop plan if impaired  - Assess patient's need for assistive devices and provide as appropriate  - Encourage maximum independence but intervene and supervise when necessary  - Involve family in performance of ADLs  - Assess for home care needs following discharge   - Consider OT consult to assist with ADL evaluation and planning for discharge  - Provide patient education as appropriate  Outcome: Progressing  Goal: Maintain or return mobility status to optimal level  Description  INTERVENTIONS:  - Assess patient's baseline mobility status (ambulation, transfers, stairs, etc )    - Identify cognitive and physical deficits and behaviors that affect mobility  - Identify mobility aids required to assist with transfers and/or ambulation (gait belt, sit-to-stand, lift, walker, cane, etc )  - [place] fall precautions as indicated by assessment  - Record patient progress and toleration of activity level on Mobility SBAR; progress patient to next Phase/Stage  - Instruct patient to call for assistance with activity based on assessment  - Consider rehabilitation consult to assist with strengthening/weightbearing, etc   Outcome: Progressing     Problem: Knowledge Deficit  Goal: Patient/family/caregiver demonstrates understanding of disease process, treatment plan, medications, and discharge instructions  Description  Complete learning assessment and assess knowledge base    Interventions:  - Provide teaching at level of understanding  - Provide teaching via preferred learning methods  Outcome: Progressing     Problem: DISCHARGE PLANNING  Goal: Discharge to home or other facility with appropriate resources  Description  INTERVENTIONS:  - Identify barriers to discharge w/patient and caregiver  - Arrange for needed discharge resources and transportation as appropriate  - Identify discharge learning needs (meds, wound care, etc )  - Arrange for interpretive services to assist at discharge as needed  - Refer to Case Management Department for coordinating discharge planning if the patient needs post-hospital services based on physician/advanced practitioner order or complex needs related to functional status, cognitive ability, or social support system  Outcome: Progressing     Problem: DISCHARGE PLANNING - CARE MANAGEMENT  Goal: Discharge to post-acute care or home with appropriate resources  Description  INTERVENTIONS:  - Conduct assessment to determine patient/family and health care team treatment goals, and need for post-acute services based on payer coverage, community resources, and patient preferences, and barriers to discharge  - Address psychosocial, clinical, and financial barriers to discharge as identified in assessment in conjunction with the patient/family and health care team  - Arrange appropriate level of post-acute services according to patients   needs and preference and payer coverage in collaboration with the physician and health care team  - Communicate with and update the patient/family, physician, and health care team regarding progress on the discharge plan  - Arrange appropriate transportation to post-acute venues  Outcome: Progressing     Problem: ALTERATION IN THE BREAST  Goal: Optimize infant feeding at the breast  Description  INTERVENTIONS:  - Latch, breast and nipple assessment  - Assess prior breast feeding history  - Hand expression of breast milk  - For cracked, bleeding and or sore nipples reassess latch, treat damaged nipple  -Educate mother on feeding cues  -Positioning/latch techniques  Outcome: Progressing     Problem: INADEQUATE LATCH, SUCK OR SWALLOW  Goal: Demonstrate ability to latch and sustain latch, audible swallowing and satiety  Description  INTERVENTIONS:  - Assess oral anatomy, notify Physician/AP for abnormal findings  - Establish milk expression  - Maximize feeding opportunity (skin to skin, behavioral state)  - Position/latch techniques  - Discourage use of pacifier-artificial nipple  - Mechanical pumping  - Nipple Shield  - Supplemental formula feeding (Physician/AP order)  - Alternative feeding method  Outcome: Progressing     Problem: POSTPARTUM  Goal: Experiences normal postpartum course  Description  INTERVENTIONS:  - Monitor maternal vital signs  - Assess uterine involution and lochia  Outcome: Progressing  Goal: Appropriate maternal -  bonding  Description  INTERVENTIONS:  - Identify family support  - Assess for appropriate maternal/infant bonding   -Encourage maternal/infant bonding opportunities  - Referral to  or  as needed  Outcome: Progressing  Goal: Establishment of infant feeding pattern  Description  INTERVENTIONS:  - Assess breast/bottle feeding  - Refer to lactation as needed  Outcome: Progressing  Goal: Incision(s), wounds(s) or drain site(s) healing without S/S of infection  Description  INTERVENTIONS  - Assess and document risk factors for skin impairment   - Assess and document dressing, incision, wound bed, drain sites and surrounding tissue  - Consider nutrition services referral as needed  - Oral mucous membranes remain intact  - Provide patient/ family education  Outcome: Progressing

## 2019-12-05 NOTE — LACTATION NOTE
This note was copied from a baby's chart  Met with Ananth Tucker for discharge teaching  Ananth Tucker is requesting early discharge today  Ananth Tucker states that with last 2 babies, she did not have a lot of colostrum so she felt the need to supplement with [place] formula  Reviewed baby's belly size and how much baby needs each feeding, but she still feels it is necessary  Mom states baby has been feeding well  Baby was asleep at time of visit  Unable to assess latch at this time  Mom has no complaints of nipple soreness and feels her breasts are starting to fill  Mom declines need to see outpatient lactation at this time  Contact information given should she need their services in the future  Mom states she does know how to hand express  Mom states she does have a pump at home  Met with mother to go over feeding log since birth for the first week  Emphasized 8 or more (12) feedings in a 24 hour period, what to expect for the number of diapers per day of life and the progression of properties of the  stooling pattern  Discussed s/s that breastfeeding is going well after day 4 and when to get help from a pediatrician or lactation support person after day 4  Booklet included Breast Pumping Instructions, When You Go Back to Work or School, and Breastfeeding Resources for after discharge including access to the number for the [id number]  Discussed s/s engorgement and how to manage with medications as well as s/s mastitis and when to contact physician  [facility]  to call for assistance, questions and concerns  Extension number for inpatient lactation support provided

## 2019-12-05 NOTE — PLAN OF CARE
Problem: PAIN - ADULT  Goal: Verbalizes/displays adequate comfort level or baseline comfort level  Description  Interventions:  - Encourage patient to monitor pain and request assistance  - Assess pain using appropriate pain scale  - Administer analgesics based on type and severity of pain and evaluate response  - Implement non-pharmacological measures as appropriate and evaluate response  - Consider cultural and social influences on pain and pain management  - Notify physician/advanced practitioner if interventions unsuccessful or patient reports new pain  Outcome: Progressing     Problem: INFECTION - ADULT  Goal: Absence or prevention of progression during hospitalization  Description  INTERVENTIONS:  - Assess and monitor for signs and symptoms of infection  - Monitor lab/diagnostic results  - Monitor all insertion sites, i e  indwelling lines, tubes, and drains  - Monitor endotracheal if appropriate and nasal secretions for changes in amount and color  - [place] appropriate cooling/warming therapies per order  - Administer medications as ordered  - Instruct and encourage patient and family to use good hand hygiene technique  - Identify and instruct in appropriate isolation precautions for identified infection/condition  Outcome: Progressing  Goal: Absence of fever/infection during neutropenic period  Description  INTERVENTIONS:  - Monitor WBC    Outcome: Progressing     Problem: SAFETY ADULT  Goal: Patient will remain free of falls  Description  INTERVENTIONS:  - Assess patient frequently for physical needs  -  Identify cognitive and physical deficits and behaviors that affect risk of falls    -  [place] fall precautions as indicated by assessment   - Educate patient/family on patient safety including physical limitations  - Instruct patient to call for assistance with activity based on assessment  - Modify environment to reduce risk of injury  - Consider OT/PT consult to assist with strengthening/mobility  Outcome: Progressing  Goal: Maintain or return to baseline ADL function  Description  INTERVENTIONS:  -  Assess patient's ability to carry out ADLs; assess patient's baseline for ADL function and identify physical deficits which impact ability to perform ADLs (bathing, care of mouth/teeth, toileting, grooming, dressing, etc )  - Assess/evaluate cause of self-care deficits   - Assess range of motion  - Assess patient's mobility; develop plan if impaired  - Assess patient's need for assistive devices and provide as appropriate  - Encourage maximum independence but intervene and supervise when necessary  - Involve family in performance of ADLs  - Assess for home care needs following discharge   - Consider OT consult to assist with ADL evaluation and planning for discharge  - Provide patient education as appropriate  Outcome: Progressing  Goal: Maintain or return mobility status to optimal level  Description  INTERVENTIONS:  - Assess patient's baseline mobility status (ambulation, transfers, stairs, etc )    - Identify cognitive and physical deficits and behaviors that affect mobility  - Identify mobility aids required to assist with transfers and/or ambulation (gait belt, sit-to-stand, lift, walker, cane, etc )  - [place] fall precautions as indicated by assessment  - Record patient progress and toleration of activity level on Mobility SBAR; progress patient to next Phase/Stage  - Instruct patient to call for assistance with activity based on assessment  - Consider rehabilitation consult to assist with strengthening/weightbearing, etc   Outcome: Progressing     Problem: Knowledge Deficit  Goal: Patient/family/caregiver demonstrates understanding of disease process, treatment plan, medications, and discharge instructions  Description  Complete learning assessment and assess knowledge base    Interventions:  - Provide teaching at level of understanding  - Provide teaching via preferred learning methods  Outcome: Progressing     Problem: DISCHARGE PLANNING  Goal: Discharge to home or other facility with appropriate resources  Description  INTERVENTIONS:  - Identify barriers to discharge w/patient and caregiver  - Arrange for needed discharge resources and transportation as appropriate  - Identify discharge learning needs (meds, wound care, etc )  - Arrange for interpretive services to assist at discharge as needed  - Refer to Case Management Department for coordinating discharge planning if the patient needs post-hospital services based on physician/advanced practitioner order or complex needs related to functional status, cognitive ability, or social support system  Outcome: Progressing     Problem: DISCHARGE PLANNING - CARE MANAGEMENT  Goal: Discharge to post-acute care or home with appropriate resources  Description  INTERVENTIONS:  - Conduct assessment to determine patient/family and health care team treatment goals, and need for post-acute services based on payer coverage, community resources, and patient preferences, and barriers to discharge  - Address psychosocial, clinical, and financial barriers to discharge as identified in assessment in conjunction with the patient/family and health care team  - Arrange appropriate level of post-acute services according to patients   needs and preference and payer coverage in collaboration with the physician and health care team  - Communicate with and update the patient/family, physician, and health care team regarding progress on the discharge plan  - Arrange appropriate transportation to post-acute venues  Outcome: Progressing     Problem: POSTPARTUM  Goal: Experiences normal postpartum course  Description  INTERVENTIONS:  - Monitor maternal vital signs  - Assess uterine involution and lochia  Outcome: Progressing  Goal: Appropriate maternal -  bonding  Description  INTERVENTIONS:  - Identify family support  - Assess for appropriate maternal/infant bonding   -Encourage maternal/infant bonding opportunities  - Referral to  or  as needed  Outcome: Progressing  Goal: Establishment of infant feeding pattern  Description  INTERVENTIONS:  - Assess breast/bottle feeding  - Refer to lactation as needed  Outcome: Progressing  Goal: Incision(s), wounds(s) or drain site(s) healing without S/S of infection  Description  INTERVENTIONS  - Assess and document risk factors for skin impairment   - Assess and document dressing, incision, wound bed, drain sites and surrounding tissue  - Consider nutrition services referral as needed  - Oral mucous membranes remain intact  - Provide patient/ family education  Outcome: Progressing     Problem: ALTERATION IN THE BREAST  Goal: Optimize infant feeding at the breast  Description  INTERVENTIONS:  - Latch, breast and nipple assessment  - Assess prior breast feeding history  - Hand expression of breast milk  - For cracked, bleeding and or sore nipples reassess latch, treat damaged nipple  -Educate mother on feeding cues  -Positioning/latch techniques  Outcome: Progressing     Problem: INADEQUATE LATCH, SUCK OR SWALLOW  Goal: Demonstrate ability to latch and sustain latch, audible swallowing and satiety  Description  INTERVENTIONS:  - Assess oral anatomy, notify Physician/AP for abnormal findings  - Establish milk expression  - Maximize feeding opportunity (skin to skin, behavioral state)  - Position/latch techniques  - Discourage use of pacifier-artificial nipple  - Mechanical pumping  - Nipple Shield  - Supplemental formula feeding (Physician/AP order)  - Alternative feeding method  Outcome: Progressing

## 2019-12-13 ENCOUNTER — POSTPARTUM VISIT (OUTPATIENT)
Dept: OBGYN CLINIC | Facility: CLINIC | Age: 34
End: 2019-12-13

## 2019-12-13 ENCOUNTER — TELEPHONE (OUTPATIENT)
Dept: OBGYN CLINIC | Facility: CLINIC | Age: 34
End: 2019-12-13

## 2019-12-13 VITALS — BODY MASS INDEX: 30.42 KG/M2 | WEIGHT: 177.2 LBS | DIASTOLIC BLOOD PRESSURE: 76 MMHG | SYSTOLIC BLOOD PRESSURE: 115 MMHG

## 2019-12-13 DIAGNOSIS — Z98.891 S/P REPEAT LOW TRANSVERSE C-SECTION: Primary | ICD-10-CM

## 2019-12-13 PROCEDURE — 99024 POSTOP FOLLOW-UP VISIT: CPT | Performed by: STUDENT IN AN ORGANIZED HEALTH CARE EDUCATION/TRAINING PROGRAM

## 2019-12-13 NOTE — PROGRESS NOTES
Stacey Steinberg  1985    S:  30 y o  female here for incision check  She is s/p  (repeat) on 19   Gender: female   Apgars:   Weight: 8#1oz     She has noted a bulge at the left aspect of her incision  She believes that it is likely scar tissue, but is also concerned that it may be an incisional hernia  We discussed contraceptive options in detail including birth control pills, patches, NuvaRing, DepoProvera, Mirena/Kyleena IUD, Nexplanon in detail  At this point she would like another Mirena IUD  EDPS: 0    O:   /76 (BP Location: Left arm, Patient Position: Sitting, Cuff Size: Standard)   Wt 80 4 kg (177 lb 3 2 oz)   LMP 2019   BMI 30 42 kg/m²   She appears well and in no distress  Abdomen is soft and nontender, incision healing well, with bilateral dense scar tissue at each end  On right aspect, appx 2x3cm, on left aspect appx 3-5cm and very firm  Not increased with valsalva, standing or seated  No hyperkinetic bowel sounds, not reducible  Fascia palpates intact  A/P:  Incision check   Will return for postpartum visit and at 6wks for Mirena placement

## 2019-12-13 NOTE — TELEPHONE ENCOUNTER
Naomy Flores! Can you please order a Mirena for this patient? She is coming back in 5 weeks for this appt  Thank you!

## 2019-12-16 LAB — PLACENTA IN STORAGE: NORMAL

## 2019-12-17 NOTE — TELEPHONE ENCOUNTER
Buy and bill Mirena labeled and placed in [facility] med room

## 2019-12-31 ENCOUNTER — POSTPARTUM VISIT (OUTPATIENT)
Dept: OBGYN CLINIC | Facility: CLINIC | Age: 34
End: 2019-12-31

## 2019-12-31 VITALS — BODY MASS INDEX: 29.18 KG/M2 | WEIGHT: 170 LBS | SYSTOLIC BLOOD PRESSURE: 110 MMHG | DIASTOLIC BLOOD PRESSURE: 74 MMHG

## 2019-12-31 DIAGNOSIS — O24.414 INSULIN CONTROLLED GESTATIONAL DIABETES MELLITUS (GDM) IN THIRD TRIMESTER: ICD-10-CM

## 2019-12-31 PROBLEM — O28.5 MATERNAL SERUM SCREEN POSITIVE FOR TRISOMY 21: Status: RESOLVED | Noted: 2019-08-01 | Resolved: 2019-12-31

## 2019-12-31 PROBLEM — Z3A.38 38 WEEKS GESTATION OF PREGNANCY: Status: RESOLVED | Noted: 2017-06-16 | Resolved: 2019-12-31

## 2019-12-31 PROBLEM — O24.419 GESTATIONAL DIABETES: Status: RESOLVED | Noted: 2019-10-08 | Resolved: 2019-12-31

## 2019-12-31 PROBLEM — Z98.891 S/P REPEAT LOW TRANSVERSE C-SECTION: Status: RESOLVED | Noted: 2019-06-04 | Resolved: 2019-12-31

## 2019-12-31 PROCEDURE — 99024 POSTOP FOLLOW-UP VISIT: CPT | Performed by: PHYSICIAN ASSISTANT

## 2019-12-31 RX ORDER — NYSTATIN 100000 U/G
CREAM TOPICAL
COMMUNITY
Start: 2019-12-27 | End: 2022-07-20

## 2019-12-31 NOTE — PROGRESS NOTES
Abdiel Prashants  1985    S:  30 y o  female here for postpartum visit  She is s/p repeat  on 19  Gender:  Female - Leo Finger  Apgars: 9,9  Weight: 8#1oz  Her lochia has resolved  She is Breastfeeding without problems  She denies postpartum blues/depression  Review of Systems   Respiratory: Negative  Cardiovascular: Negative  Gastrointestinal: Negative for constipation and diarrhea  Genitourinary: Negative for difficulty urinating, pelvic pain, vaginal bleeding, vaginal discharge, itching or odor  Last Pap: 19 ASCUS neg HPV     We discussed contraceptive options while nursing including progesterone only pills, DepoProvera, Nexplanon, IUDs, and condoms  She is scheduled for Mirena IUD insertion in two weeks       Past Medical History:   Diagnosis Date    37 weeks gestation of pregnancy 2019    Abnormal Pap smear of cervix     HPV (human papilloma virus) infection     Miscarriage 2018    Recurrent pregnancy loss, antepartum condition or complication     Varicella      Family History   Problem Relation Age of Onset    Stroke Maternal Grandfather     Cancer Maternal Grandfather     Cancer Paternal Grandmother     No Known Problems Mother     Stroke Father     No Known Problems Sister     No Known Problems Brother     No Known Problems Son     No Known Problems Maternal Grandmother     No Known Problems Paternal Grandfather     No Known Problems Son      Social History     Socioeconomic History    Marital status: /Civil Union     Spouse name: Ming To    Number of children: 3    Years of education: None    Highest education level: None   Occupational History    Occupation: Registered Nurse     Comment: [employer]   Social Needs    Financial resource strain: None    Food insecurity:     Worry: None     Inability: None    Transportation needs:     Medical: None     Non-medical: None   Tobacco Use    Smoking status: Never Smoker    Smokeless tobacco: Never Used   Substance and Sexual Activity    Alcohol use: No    Drug use: No    Sexual activity: Not Currently     Partners: Male   Lifestyle    Physical activity:     Days per week: None     Minutes per session: None    Stress: None   Relationships    Social connections:     Talks on phone: None     Gets together: None     Attends Buddhism service: None     Active member of club or organization: None     Attends meetings of clubs or organizations: None     Relationship status: None    Intimate partner violence:     Fear of current or ex partner: None     Emotionally abused: None     Physically abused: None     Forced sexual activity: None   Other Topics Concern    None   Social History Narrative    None       O:   /74 (BP Location: Right arm, Patient Position: Sitting, Cuff Size: Standard)   Wt 77 1 kg (170 lb)   LMP 03/08/2019   Breastfeeding? Yes   BMI 29 18 kg/m²     She appears well and in no distress  Abdomen is soft and nontender  External genitals are normal  Vagina is normal  Cervix, uterus and adnexa are nontender, no masses palpable  A/P:  Postpartum visit  Contraception - return for Mirena     Pap due 6/2022    She will return in 6 months for her yearly exam, sooner PRN      After her visit I noted that I did not give her a slip for a 2hr gtt - order entered, can give her slip at her Mirena insertion visit

## 2020-01-24 ENCOUNTER — PROCEDURE VISIT (OUTPATIENT)
Dept: OBGYN CLINIC | Facility: CLINIC | Age: 35
End: 2020-01-24
Payer: COMMERCIAL

## 2020-01-24 VITALS — SYSTOLIC BLOOD PRESSURE: 108 MMHG | BODY MASS INDEX: 29.49 KG/M2 | WEIGHT: 171.8 LBS | DIASTOLIC BLOOD PRESSURE: 68 MMHG

## 2020-01-24 DIAGNOSIS — Z30.430 ENCOUNTER FOR IUD INSERTION: Primary | ICD-10-CM

## 2020-01-24 PROBLEM — O24.414 INSULIN CONTROLLED GESTATIONAL DIABETES MELLITUS (GDM) IN THIRD TRIMESTER: Status: RESOLVED | Noted: 2019-11-14 | Resolved: 2020-01-24

## 2020-01-24 PROCEDURE — 58300 INSERT INTRAUTERINE DEVICE: CPT | Performed by: STUDENT IN AN ORGANIZED HEALTH CARE EDUCATION/TRAINING PROGRAM

## 2020-01-24 NOTE — PROGRESS NOTES
Iud insertions  Date/Time: 2020 3:21 PM  Performed by: Marianne Elmore MD  Authorized by: Marianne Elmore MD     Consent:     Consent obtained:  Verbal and written    Consent given by:  Patient    Procedure risks and benefits discussed: yes      Patient questions answered: yes      Patient agrees, verbalizes understanding, and wants to proceed: yes      Educational handouts given: yes      Instructions and paperwork completed: yes    Universal protocol:     Patient states understanding of procedure being performed: yes      Test results available and properly labeled: yes    Procedure:     Pelvic exam performed: yes      Negative urine pregnancy test: no (no intercourse since delivery)      Cervix cleaned and prepped: yes      Speculum placed in vagina: yes      Tenaculum applied to cervix: yes      Uterus sounded: yes      Uterus sound depth (cm):  9    IUD inserted with no complications: yes      IUD type:  Mirena    Strings trimmed: yes    Post-procedure:     Patient tolerated procedure well: yes      Pt recovering well from   Continues to have significant tightness at left aspect of incision  Has difficulty and pain when standing from sit, lifting moderately heavy items  Feels that her ability to work will be significantly limited and that working will cause her significant pain   Discussed that, given her significant adhesive disease, it would be reasonable to extend disability for appx 2 additional weeks

## 2021-09-09 ENCOUNTER — ANNUAL EXAM (OUTPATIENT)
Dept: OBGYN CLINIC | Facility: CLINIC | Age: 36
End: 2021-09-09
Payer: COMMERCIAL

## 2021-09-09 VITALS
HEIGHT: 64 IN | BODY MASS INDEX: 28.75 KG/M2 | WEIGHT: 168.4 LBS | HEART RATE: 73 BPM | SYSTOLIC BLOOD PRESSURE: 104 MMHG | DIASTOLIC BLOOD PRESSURE: 72 MMHG

## 2021-09-09 DIAGNOSIS — Z01.411 ENCOUNTER FOR GYNECOLOGICAL EXAMINATION WITH ABNORMAL FINDING: Primary | ICD-10-CM

## 2021-09-09 DIAGNOSIS — N94.10 DYSPAREUNIA IN FEMALE: ICD-10-CM

## 2021-09-09 PROBLEM — O99.019 IRON DEFICIENCY ANEMIA DURING PREGNANCY: Status: RESOLVED | Noted: 2019-10-10 | Resolved: 2021-09-09

## 2021-09-09 PROBLEM — D50.9 IRON DEFICIENCY ANEMIA DURING PREGNANCY: Status: RESOLVED | Noted: 2019-10-10 | Resolved: 2021-09-09

## 2021-09-09 PROCEDURE — S0612 ANNUAL GYNECOLOGICAL EXAMINA: HCPCS | Performed by: NURSE PRACTITIONER

## 2021-09-09 RX ORDER — ESTRADIOL 0.1 MG/G
CREAM VAGINAL
Qty: 42.5 G | Refills: 1 | Status: SHIPPED | OUTPATIENT
Start: 2021-09-09 | End: 2022-07-20

## 2021-09-09 NOTE — PROGRESS NOTES
Assessment / Plan    1  Encounter for gynecological examination with abnormal finding  Normal well woman exam  2019 ASCUS/negative HPV  Repeat     2  Dyspareunia in female  Superficial mainly, occasionally deep  Over the counter lubricants insufficient  Discussed r/b of estrogen vaginally  She agrees to trial   rx sent to her pharmacy    - estradiol (ESTRACE) 0 1 mg/g vaginal cream; 1/2 gram vaginally at night x 14 days, then twice weekly for maintenance  Dispense: 42 5 g; Refill: 1        Subjective      Tammy Dugan is a 31 y o  female who presents for her annual gynecologic exam     Hoa Marques presents for yearly GYN exam   She is new to our office, seen at [facility] in the past     2020 Mirena IUD   2019 ASCUS, neg HPV  Hx of recurrent pregnancy loss  Hx of insulin controlled GDM  Has some trouble with sex-- burning/discomfort, lack of desire   has high sex drive, physically well endowed  Periods are absent  Current contraception: IUD  History of abnormal Pap smear: yes -   Family history of breast,uterine, ovarian or colon cancer: no      Menstrual History:  OB History        5    Para   3    Term   3       0    AB   2    Living   3       SAB   2    TAB   0    Ectopic   0    Multiple   0    Live Births   3           Obstetric Comments   Menarche 16              No LMP recorded  Patient has had an implant  Period Cycle (Days):  (amenorrhea, 2020 Mirena IUD)    The following portions of the patient's history were reviewed and updated as appropriate: allergies, current medications, past family history, past medical history, past social history, past surgical history and problem list     Review of Systems      Review of Systems   Constitutional: Negative for chills and fever  Respiratory: Negative for cough and shortness of breath  Gastrointestinal: Negative for abdominal distention, abdominal pain, blood in stool, constipation, diarrhea, nausea and vomiting  Genitourinary: Positive for dyspareunia  Negative for difficulty urinating, dysuria, frequency, genital sores, hematuria, menstrual problem, pelvic pain, urgency, vaginal bleeding and vaginal discharge  Musculoskeletal: Negative for arthralgias and myalgias  Breasts:  Negative for skin changes, dimpling, asymmetry, nipple discharge, redness, tenderness or palpable masses    Objective      /72 (BP Location: Left arm, Patient Position: Sitting, Cuff Size: Standard)   Pulse 73   Ht 5' 4" (1 626 m)   Wt 76 4 kg (168 lb 6 4 oz)   BMI 28 91 kg/m²      Physical Exam  Constitutional:       General: She is not in acute distress  Appearance: Normal appearance  She is well-developed  Neck:      Thyroid: No thyromegaly  Chest:      Breasts: Breasts are symmetrical          Right: No inverted nipple, mass, nipple discharge, skin change or tenderness  Left: No inverted nipple, mass, nipple discharge, skin change or tenderness  Abdominal:      Palpations: Abdomen is soft  There is no mass  Tenderness: There is no abdominal tenderness  Genitourinary:     General: Normal vulva  Exam position: Lithotomy position  Labia:         Right: No rash, tenderness, lesion or injury  Left: No rash, tenderness, lesion or injury  Vagina: No signs of injury and foreign body  No vaginal discharge, erythema, tenderness or bleeding  Cervix: No cervical motion tenderness, discharge or friability  Uterus: Not enlarged and not tender  Adnexa:         Right: No mass or tenderness  Left: No mass or tenderness  Musculoskeletal:      Cervical back: Neck supple  Lymphadenopathy:      Cervical: No cervical adenopathy  Upper Body:      Right upper body: No supraclavicular adenopathy  Left upper body: No supraclavicular adenopathy  Skin:     General: Skin is warm and dry     Neurological:      Mental Status: She is alert and oriented to person, place, and time     Psychiatric:         Behavior: Behavior normal

## 2021-09-13 ENCOUNTER — OFFICE VISIT (OUTPATIENT)
Dept: URGENT CARE | Facility: CLINIC | Age: 36
End: 2021-09-13
Payer: COMMERCIAL

## 2021-09-13 VITALS
RESPIRATION RATE: 16 BRPM | TEMPERATURE: 98.2 F | OXYGEN SATURATION: 98 % | DIASTOLIC BLOOD PRESSURE: 79 MMHG | WEIGHT: 174 LBS | BODY MASS INDEX: 29.71 KG/M2 | HEIGHT: 64 IN | HEART RATE: 71 BPM | SYSTOLIC BLOOD PRESSURE: 129 MMHG

## 2021-09-13 DIAGNOSIS — K04.7 DENTAL INFECTION: Primary | ICD-10-CM

## 2021-09-13 PROCEDURE — 99213 OFFICE O/P EST LOW 20 MIN: CPT | Performed by: PHYSICIAN ASSISTANT

## 2021-09-13 RX ORDER — AMOXICILLIN 500 MG/1
500 CAPSULE ORAL EVERY 8 HOURS SCHEDULED
Qty: 21 CAPSULE | Refills: 0 | Status: SHIPPED | OUTPATIENT
Start: 2021-09-13 | End: 2021-09-20

## 2021-09-13 RX ORDER — CHLORHEXIDINE GLUCONATE 0.12 MG/ML
15 RINSE ORAL 2 TIMES DAILY
Qty: 120 ML | Refills: 0 | Status: SHIPPED | OUTPATIENT
Start: 2021-09-13 | End: 2022-07-20

## 2021-09-13 NOTE — PROGRESS NOTES
[facility]        NAME: Katie Galloway is a 31 y o  female  : 1985    MRN: [id number]  DATE: 2021  TIME: 10:42 AM    Assessment and Plan   Dental infection [K04 7]  1  Dental infection  amoxicillin (AMOXIL) 500 mg capsule    chlorhexidine (PERIDEX) 0 12 % solution    discussed continued over-the-counter medications as needed for pain recommend Peridex and amoxicillin  Patient will be started on both and is instructed to follow up with the dental professional soon as possible  If symptoms worsen in any way she will report to the emergency room immediately      Patient Instructions     Patient Instructions     Taken antibiotic as prescribed  Use Peridex as prescribed x 1 week  Follow-up with dentist ASAP  If symptoms worsen, report to ED  Dental Abscess   AMBULATORY CARE:   A dental abscess  is a collection of pus in or around a tooth  A dental abscess is caused by bacteria  The bacteria can enter the tooth when the enamel (outer part of the tooth) is damaged by tooth decay  Bacteria can also enter the tooth through a chip in the tooth or a cut in the gum  Food particles that are stuck between the teeth for a long time may also lead to an abscess  Common signs and symptoms:   · Toothache, a loose tooth, or a tooth that is very sensitive to pressure or temperature    · Bad breath, unpleasant taste, and drooling    · Fever    · Pain, redness, and swelling of the gums, or swelling of your face and neck    · Pain when you open or close your mouth    · Trouble opening your mouth    Seek care immediately if:   · You have severe pain in your tooth or jaw  · You have trouble breathing because of pain or swelling  Call your doctor if:   · Your symptoms get worse, even after treatment  · Your mouth is bleeding  · You cannot eat or drink because of pain or swelling  · Your abscess returns  · You have an injury that causes a crack in your tooth      · You have questions or concerns about your condition or care  Treatment:  You may  need any of the following:  · Medicines  may be given to treat a bacterial infection and decrease pain  · Incision and drainage  is a cut in the abscess to allow the pus to drain  A sample of fluid may be collected from your abscess  The fluid is sent to a lab and tested for bacteria  Ask your healthcare provider for more information  · A root canal  is a procedure to remove the bacteria and prevent more infection  It is usually done after an incision and drainage  A filling or crown will be placed over the tooth after you have healed from your root canal      · Tooth removal  may be needed if the infection affects deeper tissues  This is usually done after an incision and drainage  Self-care:   · Rinse your mouth every 2 hours with salt water  This will help keep the area clean  · Gently brush your teeth twice a day with a soft tooth brush  This will help keep the area clean  · Eat soft foods as directed  Soft foods may cause less pain  Examples include applesauce, yogurt, and cooked pasta  Ask your healthcare provider how long to follow this instruction  · Apply a warm compress to your tooth or gum  Use a cotton ball or gauze soaked in warm water  Remove the compress in 10 minutes or when it becomes cool  Repeat 3 times a day  Prevent another abscess:   · Brush your teeth at least 2 times a day  with fluoride toothpaste  · Use dental floss at least once a day  to clean between your teeth  · Rinse your mouth with water or mouthwash  after meals and snacks  Chew sugarless gum  · Avoid sugary and starchy food that can stick between your teeth  Limit drinks high in sugar, such as soda or fruit juice  · See your dentist every 6 months  for dental cleanings and oral exams  Follow up with your doctor or dentist as directed: Your healthcare provider will need to check your teeth and gums   Write down your questions so you remember to ask them during your visits  © Copyright [employer] 2021 Information is for End User's use only and may not be sold, redistributed or otherwise used for commercial purposes  All illustrations and images included in CareNotes® are the copyrighted property of A D A M , Inc  or [employer] Michael  The above information is an  only  It is not intended as medical advice for individual conditions or treatments  Talk to your doctor, nurse or pharmacist before following any medical regimen to see if it is safe and effective for you  Follow up with PCP in 3-5 days  Proceed to  ER if symptoms worsen  Chief Complaint     Chief Complaint   Patient presents with    Dental Pain     Right sided jaw pain and swelling radiating down neck and ear  Used hot compress last night, ibuprofen @ 5:30 am          History of Present Illness       22-year-old female presents with complaint of  Right lower tooth pain, and jaw pain with associated facial swelling  Patient reports that symptoms started yesterday and she woke with facial swelling this morning  She denies any fevers or chills  She notes some minor sore throat  Patient states that she still has her wisdom teeth to the lower jaw and occasionally they will get irritated in flare up  She is planning on having them extracted but has not scheduled this at this time  She did call her dentist but has not heard back from them yet  Patient has no other concerns or complaints  Review of Systems   Review of Systems   HENT: Positive for dental problem and facial swelling            Current Medications       Current Outpatient Medications:     estradiol (ESTRACE) 0 1 mg/g vaginal cream, 1/2 gram vaginally at night x 14 days, then twice weekly for maintenance, Disp: 42 5 g, Rfl: 1    levonorgestrel (MIRENA) 20 MCG/24HR IUD, 1 each by Intrauterine route once, Disp: , Rfl:     amoxicillin (AMOXIL) 500 mg capsule, Take 1 capsule (500 mg total) by mouth every 8 (eight) hours for 7 days, Disp: 21 capsule, Rfl: 0    chlorhexidine (PERIDEX) 0 12 % solution, Apply 15 mL to the mouth or throat 2 (two) times a day, Disp: 120 mL, Rfl: 0    nystatin (MYCOSTATIN) cream, , Disp: , Rfl:     Prenatal MV-Min-Fe Fum-FA-DHA (PRENATAL 1 PO), Take 1 tablet by mouth daily (Patient not taking: Reported on 2021), Disp: , Rfl:     Current Allergies     Allergies as of 2021    (No Known Allergies)            The following portions of the patient's history were reviewed and updated as appropriate: allergies, current medications, past family history, past medical history, past social history, past surgical history and problem list      Past Medical History:   Diagnosis Date    37 weeks gestation of pregnancy 2019    Abnormal Pap smear of cervix     2019 ASCUS/neg HPV    Gestational diabetes     insulin controlled    HPV (human papilloma virus) infection     Miscarriage 2018    Recurrent pregnancy loss, antepartum condition or complication     Varicella        Past Surgical History:   Procedure Laterality Date    APPENDECTOMY       SECTION      COLPOSCOPY      early 25s    MA  DELIVERY ONLY N/A 2017    Procedure:  SECTION () REPEAT;  Surgeon: Kareem Badillo MD;  Location: [facility] ;  Service: Obstetrics    MA  DELIVERY ONLY N/A 2019    Procedure:  SECTION () REPEAT;  Surgeon: Roro Reed MD;  Location: [facility] ;  Service: Obstetrics       Family History   Problem Relation Age of Onset    Stroke Maternal Grandfather     Cancer Maternal Grandfather         lymph node    Cancer Paternal Grandmother         brain tumor    No Known Problems Mother     Stroke Father     Hypertension Father     No Known Problems Sister     No Known Problems Brother     No Known Problems Son     No Known Problems Maternal Grandmother     No Known Problems Paternal Grandfather     No Known Problems Son     Ovarian cancer Neg Hx     Colon cancer Neg Hx     Breast cancer Neg Hx     Uterine cancer Neg Hx          Medications have been verified  Objective   /79 (BP Location: Left arm, Patient Position: Sitting, Cuff Size: Standard)   Pulse 71   Temp 98 2 °F (36 8 °C) (Tympanic)   Resp 16   Ht 5' 4" (1 626 m)   Wt 78 9 kg (174 lb)   SpO2 98%   BMI 29 87 kg/m²   No LMP recorded  Patient has had an implant  Physical Exam     Physical Exam  Vitals and nursing note reviewed  Constitutional:       General: She is awake  She is not in acute distress  Appearance: Normal appearance  She is well-developed and well-groomed  She is not ill-appearing, toxic-appearing or diaphoretic  HENT:      Head: Atraumatic  Comments:   Mild swelling to the right cheek with tenderness to palpation  Right Ear: Hearing and external ear normal       Left Ear: Hearing and external ear normal       Mouth/Throat:      Dentition: Gingival swelling ( overThe wisdom tooth area on theight lower side) present  No dental tenderness  Comments:  Gingival swelling where the wisdom teeth are located on the right lower side  There is extreme tenderness to palpation to this area as well no obvious abscess or drainage noted no tenderness to the teeth  Eyes:      General: Lids are normal  Vision grossly intact  Gaze aligned appropriately  Cardiovascular:      Rate and Rhythm: Normal rate  Pulmonary:      Effort: Pulmonary effort is normal       Comments: Patient is speaking in full sentences with no increased respiratory effort  No audible wheezing or stridor  Musculoskeletal:      Cervical back: Normal range of motion  Skin:     General: Skin is warm and dry  Neurological:      Mental Status: She is alert and oriented to person, place, and time  Coordination: Coordination is intact  Gait: Gait is intact     Psychiatric:         Attention and Perception: Attention and perception normal          Mood and Affect: Mood and affect normal          Speech: Speech normal          Behavior: Behavior normal  Behavior is cooperative  Note: Portions of this record may have been created with voice recognition software  Occasional wrong word or "sound a like" substitutions may have occurred due to the inherent limitations of voice recognition software  Please read the chart carefully and recognize, using context, where substitutions have occurred  *

## 2021-09-13 NOTE — PATIENT INSTRUCTIONS
Taken antibiotic as prescribed  Use Peridex as prescribed x 1 week  Follow-up with dentist ASAP  If symptoms worsen, report to ED  Dental Abscess   AMBULATORY CARE:   A dental abscess  is a collection of pus in or around a tooth  A dental abscess is caused by bacteria  The bacteria can enter the tooth when the enamel (outer part of the tooth) is damaged by tooth decay  Bacteria can also enter the tooth through a chip in the tooth or a cut in the gum  Food particles that are stuck between the teeth for a long time may also lead to an abscess  Common signs and symptoms:   · Toothache, a loose tooth, or a tooth that is very sensitive to pressure or temperature    · Bad breath, unpleasant taste, and drooling    · Fever    · Pain, redness, and swelling of the gums, or swelling of your face and neck    · Pain when you open or close your mouth    · Trouble opening your mouth    Seek care immediately if:   · You have severe pain in your tooth or jaw  · You have trouble breathing because of pain or swelling  Call your doctor if:   · Your symptoms get worse, even after treatment  · Your mouth is bleeding  · You cannot eat or drink because of pain or swelling  · Your abscess returns  · You have an injury that causes a crack in your tooth  · You have questions or concerns about your condition or care  Treatment:  You may  need any of the following:  · Medicines  may be given to treat a bacterial infection and decrease pain  · Incision and drainage  is a cut in the abscess to allow the pus to drain  A sample of fluid may be collected from your abscess  The fluid is sent to a lab and tested for bacteria  Ask your healthcare provider for more information  · A root canal  is a procedure to remove the bacteria and prevent more infection  It is usually done after an incision and drainage   A filling or crown will be placed over the tooth after you have healed from your root canal  · Tooth removal  may be needed if the infection affects deeper tissues  This is usually done after an incision and drainage  Self-care:   · Rinse your mouth every 2 hours with salt water  This will help keep the area clean  · Gently brush your teeth twice a day with a soft tooth brush  This will help keep the area clean  · Eat soft foods as directed  Soft foods may cause less pain  Examples include applesauce, yogurt, and cooked pasta  Ask your healthcare provider how long to follow this instruction  · Apply a warm compress to your tooth or gum  Use a cotton ball or gauze soaked in warm water  Remove the compress in 10 minutes or when it becomes cool  Repeat 3 times a day  Prevent another abscess:   · Brush your teeth at least 2 times a day  with fluoride toothpaste  · Use dental floss at least once a day  to clean between your teeth  · Rinse your mouth with water or mouthwash  after meals and snacks  Chew sugarless gum  · Avoid sugary and starchy food that can stick between your teeth  Limit drinks high in sugar, such as soda or fruit juice  · See your dentist every 6 months  for dental cleanings and oral exams  Follow up with your doctor or dentist as directed: Your healthcare provider will need to check your teeth and gums  Write down your questions so you remember to ask them during your visits  © Copyright [employer] 2021 Information is for End User's use only and may not be sold, redistributed or otherwise used for commercial purposes  All illustrations and images included in CareNotes® are the copyrighted property of [employer] M , Inc  or [employer]obo   The above information is an  only  It is not intended as medical advice for individual conditions or treatments  Talk to your doctor, nurse or pharmacist before following any medical regimen to see if it is safe and effective for you

## 2021-09-13 NOTE — LETTER
September 13, 2021     Patient: Linda Little   YOB: 1985   Date of Visit: 9/13/2021       To Whom It May Concern: It is my medical opinion that Bee Chacon may return to work on 09/14/2021  SHe has a non-contagious painful condition preventing her from performing normal work duties       If you have any questions or concerns, please don't hesitate to call           Sincerely,        Zoe Patel PA-C    CC: No Recipients

## 2021-09-28 ENCOUNTER — APPOINTMENT (OUTPATIENT)
Dept: LAB | Facility: MEDICAL CENTER | Age: 36
End: 2021-09-28

## 2021-09-28 ENCOUNTER — OCCMED (OUTPATIENT)
Dept: URGENT CARE | Facility: MEDICAL CENTER | Age: 36
End: 2021-09-28

## 2021-09-28 DIAGNOSIS — Z02.1 PRE-EMPLOYMENT HEALTH SCREENING EXAMINATION: ICD-10-CM

## 2021-09-28 DIAGNOSIS — Z02.1 PRE-EMPLOYMENT HEALTH SCREENING EXAMINATION: Primary | ICD-10-CM

## 2021-09-28 LAB — RUBV IGG SERPL IA-ACNC: 32.7 IU/ML

## 2021-09-28 PROCEDURE — 86765 RUBEOLA ANTIBODY: CPT

## 2021-09-28 PROCEDURE — 86762 RUBELLA ANTIBODY: CPT

## 2021-09-28 PROCEDURE — 86787 VARICELLA-ZOSTER ANTIBODY: CPT

## 2021-09-28 PROCEDURE — 36415 COLL VENOUS BLD VENIPUNCTURE: CPT

## 2021-09-28 PROCEDURE — 86735 MUMPS ANTIBODY: CPT

## 2021-09-30 LAB
MEV IGG SER QL: NORMAL
MUV IGG SER QL: NORMAL
VZV IGG SER IA-ACNC: NORMAL

## 2021-12-21 ENCOUNTER — TELEPHONE (OUTPATIENT)
Dept: DERMATOLOGY | Facility: CLINIC | Age: 36
End: 2021-12-21

## 2022-02-17 NOTE — PROGRESS NOTES
Hospital Medicine Discharge Summary    Patient ID: Chi Salinas      Patient's PCP: Gulshan Juares MD    Admit Date: 2/16/2022     Discharge Date: 2/17/2022      Admitting Physician: Angela Boyd MD     Discharge Physician: Angela Boyd MD     Discharge Diagnoses: There are no active hospital problems to display for this patient. The patient was seen and examined on day of discharge and this discharge summary is in conjunction with any daily progress note from day of discharge. Hospital Course: Chi Salinas 68 y.o. female with history of depression came into ER with a complaint of back pain and shortness of breath which woke her up from sleep.  has a 40-pack-year history of smoking. Stress test low risk scan. Echo showed Normal EF with G1DD. Discussed with patient will discharge of lasix daily. She will follow up with PCP. Final Dx:  Acute Diastolic CHF. Depression          Physical Exam Performed:     /73   Pulse 95   Temp 96.8 °F (36 °C) (Oral)   Resp 18   Ht 5' 9\" (1.753 m)   Wt 197 lb 11.2 oz (89.7 kg)   SpO2 93%   BMI 29.20 kg/m²       General appearance:  No apparent distress, appears stated age and cooperative. HEENT:  Normal cephalic, atraumatic without obvious deformity. Pupils equal, round, and reactive to light. Extra ocular muscles intact. Conjunctivae/corneas clear. Neck: Supple, with full range of motion. No jugular venous distention. Trachea midline. Respiratory:  Normal respiratory effort. Clear to auscultation, bilaterally without Rales/Wheezes/Rhonchi. Cardiovascular:  Regular rate and rhythm with normal S1/S2 without murmurs, rubs or gallops. Abdomen: Soft, non-tender, non-distended with normal bowel sounds. Musculoskeletal:  1+BL LE edema  Skin: Skin color, texture, turgor normal.  No rashes or lesions. Neurologic:  Neurovascularly intact without any focal sensory/motor deficits.  Cranial nerves: II-XII intact, grossly Nonstress testing was performed for the indication of gestational diabetes which was reactive  non-focal.  Psychiatric:  Alert and oriented, thought content appropriate, normal insight  Capillary Refill: Brisk,< 3 seconds   Peripheral Pulses: +2 palpable, equal bilaterally       Labs: For convenience and continuity at follow-up the following most recent labs are provided:      CBC:    Lab Results   Component Value Date    WBC 3.5 02/16/2022    HGB 12.5 02/16/2022    HCT 36.5 02/16/2022     02/16/2022       Renal:    Lab Results   Component Value Date     02/17/2022    K 3.9 02/17/2022     02/17/2022    CO2 26 02/17/2022    BUN 20 02/17/2022    CREATININE 0.8 02/17/2022    CALCIUM 9.5 02/17/2022         Significant Diagnostic Studies    Radiology:   VL Extremity Venous Bilateral         NM MYOCARDIAL SPECT REST EXERCISE OR RX   Final Result      XR CHEST PORTABLE   Final Result     No evidence of acute cardiopulmonary disease. Consults:     IP CONSULT TO HOSPITALIST  IP CONSULT TO HEART FAILURE NURSE/COORDINATOR  IP CONSULT TO DIETITIAN    Disposition:  Home    Condition at Discharge: Stable    Discharge Instructions/Follow-up:  PCP    Code Status:  Prior     Activity: activity as tolerated    Diet: cardiac diet      Discharge Medications:     Discharge Medication List as of 2/17/2022  4:18 PM           Details   furosemide (LASIX) 40 MG tablet Take 1 tablet by mouth daily, Disp-30 tablet, R-0Normal              Details   fluticasone (FLONASE) 50 MCG/ACT nasal spray 1 spray by Each Nostril route 2 times daily, Disp-32 g, R-3Normal      escitalopram (LEXAPRO) 10 MG tablet TAKE 1 TABLET BY MOUTH DAILY. , Disp-90 tablet, R-3Normal      amoxicillin (AMOXIL) 875 MG tablet TAKE 1 TABLET BY MOUTH TWICE DAILY FOR 10 DAYS, Disp-20 tablet, R-0Normal      calcium-vitamin D (OSCAL-500) 500-200 MG-UNIT per tablet Take 1 tablet by mouth dailyHistorical Med      Ferrous Sulfate (IRON PO) Take 90 mg by mouth dailyHistorical Med      Cholecalciferol (VITAMIN D3 PO) Take 2,000 Units by mouth dailyHistorical Med      lidocaine 4 % external patch Place 1 patch onto the skin daily, Transdermal, DAILY Starting Sat 11/9/2019, Disp-7 patch, R-0, Print      sennosides-docusate sodium (SENOKOT-S) 8.6-50 MG tablet Take 2 tablets by mouth daily, Disp-30 tablet, R-0Print             Time Spent on discharge is more than 30 minutes in the examination, evaluation, counseling and review of medications and discharge plan. Signed:    Ida Loera MD   2/18/2022      Thank you Ron Hopkins MD for the opportunity to be involved in this patient's care. If you have any questions or concerns please feel free to contact me at [phone].

## 2022-02-23 NOTE — ASSESSMENT & PLAN NOTE
Completed iron infusions 2 weeks ago  Slip provided for CBC today  Plans to have drawn tomorrow with CMP/Hgb A1c ordered by MEGAN  continue preop work up   continue asa 325/ Lopressor 25 q8/ Atorvastatin 80  shower qd  continue iron/ folate/ vit c/ epogen week  limited blood draws- pt jehovah witness  2/20 Hematocrit 44.2 Hemoglobin 14.6  Plan for CABG Friday 2/25 with Dr. Aaron

## 2022-07-20 ENCOUNTER — OFFICE VISIT (OUTPATIENT)
Dept: OBGYN CLINIC | Facility: CLINIC | Age: 37
End: 2022-07-20
Payer: COMMERCIAL

## 2022-07-20 VITALS
HEIGHT: 64 IN | WEIGHT: 184.8 LBS | SYSTOLIC BLOOD PRESSURE: 116 MMHG | BODY MASS INDEX: 31.55 KG/M2 | DIASTOLIC BLOOD PRESSURE: 78 MMHG

## 2022-07-20 DIAGNOSIS — Z30.433 ENCOUNTER FOR REMOVAL AND REINSERTION OF INTRAUTERINE CONTRACEPTIVE DEVICE (IUD): Primary | ICD-10-CM

## 2022-07-20 PROCEDURE — 58300 INSERT INTRAUTERINE DEVICE: CPT | Performed by: STUDENT IN AN ORGANIZED HEALTH CARE EDUCATION/TRAINING PROGRAM

## 2022-07-20 PROCEDURE — 58301 REMOVE INTRAUTERINE DEVICE: CPT | Performed by: STUDENT IN AN ORGANIZED HEALTH CARE EDUCATION/TRAINING PROGRAM

## 2022-07-20 RX ORDER — COPPER 313.4 MG/1
1 INTRAUTERINE DEVICE INTRAUTERINE ONCE
Status: COMPLETED | OUTPATIENT
Start: 2022-07-20 | End: 2022-07-20

## 2022-07-20 RX ADMIN — COPPER 1 INTRA UTERINE DEVICE: 313.4 INTRAUTERINE DEVICE INTRAUTERINE at 10:44

## 2022-07-20 NOTE — PROGRESS NOTES
Assessment/Plan:     Problem List Items Addressed This Visit    None     Visit Diagnoses     Encounter for removal and reinsertion of intrauterine contraceptive device (IUD)    -  Primary    Relevant Medications    intrauterine copper (PARAGARD) IUD (Completed)        - reviewed effects of progesterone, whether associated formally with IUD, could be the cause of many of her symptoms  Reviewed options, supported desire for removal of LNG-IUD to ascertain relationship between IUD and symptoms    - IUD strings not visualized today, but IUD seen I n    RTO annual/string check    Subjective:      Patient ID: Josselyn Monsivais is a 30 y o  female  HPI  She presents today for discussion regarding her Mirena IUD  Since placement, she notes significant mood swings and irritability, which she did not previously experience, as well as decreased libido  She is unsure if this is related to her IUD, but cannot seem to find any other cause  Additionally, she notes that she is now carrying her weight in her lower belly, despite strict diet/exercise, where she did not do this previously  The following portions of the patient's history were reviewed and updated as appropriate: allergies, current medications, past family history, past medical history, past social history, past surgical history and problem list     Review of Systems  as above    Objective:  /78 (BP Location: Left arm, Patient Position: Sitting, Cuff Size: Large)   Ht 5' 4" (1 626 m)   Wt 83 8 kg (184 lb 12 8 oz)   BMI 31 72 kg/m²      Physical Exam  Vitals reviewed  Constitutional:       Appearance: She is well-developed  HENT:      Head: Normocephalic  Cardiovascular:      Rate and Rhythm: Normal rate  Pulmonary:      Effort: Pulmonary effort is normal    Abdominal:      General: There is no distension  Palpations: Abdomen is soft  Tenderness: There is no abdominal tenderness  There is no guarding or rebound     Genitourinary: General: Normal vulva  Exam position: Lithotomy position  Vagina: No bleeding  Cervix: No cervical motion tenderness  Comments: IUD strings not visualized  Musculoskeletal:         General: Normal range of motion  Cervical back: Normal range of motion  Skin:     General: Skin is warm and dry  Neurological:      Mental Status: She is alert and oriented to person, place, and time     Psychiatric:         Behavior: Behavior normal

## 2022-07-20 NOTE — PROGRESS NOTES
Iud removal    Date/Time: 7/20/2022 4:17 PM  Performed by: Madisyn Rm MD  Authorized by: Madisyn Rm MD   Universal Protocol:  Consent: Verbal consent obtained  Risks and benefits: risks, benefits and alternatives were discussed  Consent given by: patient  Patient understanding: patient states understanding of the procedure being performed  Procedure consent: procedure consent matches procedure scheduled  Relevant documents: relevant documents present and verified  Radiology Images displayed and confirmed  If images not available, report reviewed: imaging studies available  Required items: required blood products, implants, devices, and special equipment available  Patient identity confirmed: verbally with patient      Procedure:     Removed with no complications: yes    Comments:      Unable to visualize strings, TAUS and TVUS performed to localize IUD to posterior cervical canal  With ultrasound guidance, IUD removed from cervical canal   Iud insertions    Date/Time: 7/20/2022 4:18 PM  Performed by: Madisyn Rm MD  Authorized by: Madisyn Rm MD   Universal Protocol:  Consent: Verbal consent obtained  Risks and benefits: risks, benefits and alternatives were discussed  Consent given by: patient  Time out: Immediately prior to procedure a "time out" was called to verify the correct patient, procedure, equipment, support staff and site/side marked as required    Patient understanding: patient states understanding of the procedure being performed  Patient consent: the patient's understanding of the procedure matches consent given  Procedure consent: procedure consent matches procedure scheduled  Relevant documents: relevant documents present and verified  Required items: required blood products, implants, devices, and special equipment available  Patient identity confirmed: verbally with patient        Procedure:     Cervix cleaned and prepped: yes      Speculum placed in vagina: yes Tenaculum applied to cervix: yes      Uterus sounded: yes      Uterus sound depth (cm):  9    IUD inserted with no complications: yes      IUD type:  ParaGard    Strings trimmed: yes    Post-procedure:     Patient tolerated procedure well: yes      Patient will follow up after next period: yes

## 2022-08-19 ENCOUNTER — OFFICE VISIT (OUTPATIENT)
Dept: OBGYN CLINIC | Facility: CLINIC | Age: 37
End: 2022-08-19
Payer: COMMERCIAL

## 2022-08-19 VITALS
WEIGHT: 185 LBS | DIASTOLIC BLOOD PRESSURE: 84 MMHG | BODY MASS INDEX: 31.58 KG/M2 | SYSTOLIC BLOOD PRESSURE: 128 MMHG | HEIGHT: 64 IN

## 2022-08-19 DIAGNOSIS — Z30.432 ENCOUNTER FOR IUD REMOVAL: Primary | ICD-10-CM

## 2022-08-19 PROCEDURE — 58301 REMOVE INTRAUTERINE DEVICE: CPT | Performed by: STUDENT IN AN ORGANIZED HEALTH CARE EDUCATION/TRAINING PROGRAM

## 2022-08-19 NOTE — PROGRESS NOTES
Iud removal    Date/Time: 8/19/2022 2:43 PM  Performed by: Anu Lee MD  Authorized by: Anu Lee MD   Universal Protocol:  Consent: Verbal consent obtained    Risks and benefits: risks, benefits and alternatives were discussed  Consent given by: patient  Patient understanding: patient states understanding of the procedure being performed  Procedure consent: procedure consent matches procedure scheduled  Relevant documents: relevant documents present and verified  Required items: required blood products, implants, devices, and special equipment available  Patient identity confirmed: verbally with patient      Procedure:     Removed with no complications: yes      Removal due to mechanical complications of IUD: yes    Comments:      IUD partially expelled

## 2022-10-03 ENCOUNTER — TELEPHONE (OUTPATIENT)
Dept: OBGYN CLINIC | Facility: CLINIC | Age: 37
End: 2022-10-03

## 2022-10-03 NOTE — TELEPHONE ENCOUNTER
----- Message from Erica Gardner sent at 10/3/2022 12:00 PM EDT -----  Regarding:  Samantha Levine clauser   May I have a script for hormone blood work  Looking to check thyroid and hormone levels before our appt so we can have results and discuss during appt at end of month

## 2022-10-03 NOTE — TELEPHONE ENCOUNTER
Would recommend waiting until appt to discuss sx and what labs needed to be ordered      Thanks,  Conception Juneau

## 2022-12-21 ENCOUNTER — OFFICE VISIT (OUTPATIENT)
Dept: OBGYN CLINIC | Facility: CLINIC | Age: 37
End: 2022-12-21

## 2022-12-21 VITALS
WEIGHT: 190 LBS | SYSTOLIC BLOOD PRESSURE: 120 MMHG | BODY MASS INDEX: 32.44 KG/M2 | DIASTOLIC BLOOD PRESSURE: 80 MMHG | HEIGHT: 64 IN

## 2022-12-21 DIAGNOSIS — Z11.51 SCREENING FOR HPV (HUMAN PAPILLOMAVIRUS): ICD-10-CM

## 2022-12-21 DIAGNOSIS — Z12.4 ENCOUNTER FOR PAPANICOLAOU SMEAR FOR CERVICAL CANCER SCREENING: ICD-10-CM

## 2022-12-21 DIAGNOSIS — N83.209 CYST OF OVARY, UNSPECIFIED LATERALITY: ICD-10-CM

## 2022-12-21 DIAGNOSIS — Z01.419 ENCOUNTER FOR GYNECOLOGICAL EXAMINATION WITHOUT ABNORMAL FINDING: Primary | ICD-10-CM

## 2022-12-21 DIAGNOSIS — R63.5 WEIGHT GAIN: ICD-10-CM

## 2022-12-21 RX ORDER — DIPHENOXYLATE HYDROCHLORIDE AND ATROPINE SULFATE 2.5; .025 MG/1; MG/1
1 TABLET ORAL DAILY
COMMUNITY

## 2022-12-21 NOTE — PATIENT INSTRUCTIONS
S CIWA





- CIWA Score


Nausea/Vomitin-No Nausea/No Vomiting


Muscle Tremors: 1-None Visible, but Felt


Anxiety: 1-Mildly Anxious


Agitation: 3


Paroxysmal Sweats: 1-Minimal Palms Moist


Orientation: 0-Oriented


Tacttile Disturbances: 0-None


Auditory Disturbances: 0-None


Visual Disturbances: 0-None


Headache: 0-None Present


CIWA-Ar Total Score: 6





[facility] Progress Note (SOAP)


Subjective: 





Pt reports  medications effective for w/s.  Oob ambulating with steady gait. 

Detox proceeding well.


Objective: 





19 11:19


 Vital Signs - 24 hr











  19





  13:24 18:03 23:05


 


Temperature 97.8 F 98.4 F 97.6 F


 


Pulse Rate 81 78 84


 


Respiratory 18 17 17





Rate   


 


Blood Pressure 115/78 125/67 125/79














  19





  00:30 03:30 06:14


 


Temperature   97.0 F L


 


Pulse Rate   76


 


Respiratory 18 18 18





Rate   


 


Blood Pressure   116/71














  19





  09:37


 


Temperature 97.3 F L


 


Pulse Rate 88


 


Respiratory 18





Rate 


 


Blood Pressure 134/81








 Laboratory Tests











  19





  07:50 07:50 07:50


 


WBC  5.1  


 


RBC  5.13  


 


Hgb  14.9  


 


Hct  44.5  


 


MCV  86.9  


 


MCH  29.1  


 


MCHC  33.5  


 


RDW  14.6  


 


Plt Count  285  D  


 


MPV  8.2  


 


Sodium   137 


 


Potassium   3.9 


 


Chloride   99 


 


Carbon Dioxide   31 


 


Anion Gap   7 L 


 


BUN   18.6 H 


 


Creatinine   1.0 


 


Est GFR (CKD-EPI)AfAm   103.42 


 


Est GFR (CKD-EPI)NonAf   89.23 


 


Random Glucose   90 


 


Calcium   9.8 


 


Total Bilirubin   0.4 


 


AST   24 


 


ALT   42 


 


Alkaline Phosphatase   81 


 


Total Protein   9.2 H 


 


Albumin   4.2 


 


Urine Color   


 


Urine Appearance   


 


Urine pH   


 


Ur Specific Gravity   


 


Urine Protein   


 


Urine Glucose (UA)   


 


Urine Ketones   


 


Urine Blood   


 


Urine Nitrite   


 


Urine Bilirubin   


 


Urine Urobilinogen   


 


Ur Leukocyte Esterase   


 


RPR Titer    Nonreactive














  19





  19:37


 


WBC 


 


RBC 


 


Hgb 


 


Hct 


 


MCV 


 


MCH 


 


MCHC 


 


RDW 


 


Plt Count 


 


MPV 


 


Sodium 


 


Potassium 


 


Chloride 


 


Carbon Dioxide 


 


Anion Gap 


 


BUN 


 


Creatinine 


 


Est GFR (CKD-EPI)AfAm 


 


Est GFR (CKD-EPI)NonAf 


 


Random Glucose 


 


Calcium 


 


Total Bilirubin 


 


AST 


 


ALT 


 


Alkaline Phosphatase 


 


Total Protein 


 


Albumin 


 


Urine Color  Yellow


 


Urine Appearance  Turbid


 


Urine pH  5.0


 


Ur Specific Gravity  1.023


 


Urine Protein  Negative


 


Urine Glucose (UA)  Negative


 


Urine Ketones  Trace H


 


Urine Blood  Negative


 


Urine Nitrite  Negative


 


Urine Bilirubin  Negative


 


Urine Urobilinogen  0.2


 


Ur Leukocyte Esterase  Negative


 


RPR Titer 











Assessment: 





19 11:19


nad


Plan: 





continue detox Weight Management   WHAT YOU NEED TO KNOW:   Being overweight increases your risk of health conditions such as heart disease, high blood pressure, type 2 diabetes, and certain types of cancer  It can also increase your risk for osteoarthritis, sleep apnea, and other respiratory problems  Aim for a slow, steady weight loss  Even a small amount of weight loss can lower your risk of health problems  DISCHARGE INSTRUCTIONS:   How to lose weight safely:  A safe and healthy way to lose weight is to eat fewer calories and get regular exercise  You can lose up about 1 pound a week by decreasing the number of calories you eat by 500 calories each day  You can decrease calories by eating smaller portion sizes or by cutting out high-calorie foods  Read labels to find out how many calories are in the foods you eat  You can also burn calories with exercise such as walking, swimming, or biking  You will be more likely to keep weight off if you make these changes part of your lifestyle  Exercise at least 30 minutes per day on most days of the week  You can also fit in more physical activity by taking the stairs instead of the elevator or parking farther away from stores  Ask your healthcare provider about the best exercise plan for you  Healthy meal plan for weight management:  A healthy meal plan includes a variety of foods, contains fewer calories, and helps you stay healthy  A healthy meal plan includes the following:     Eat whole-grain foods more often  A healthy meal plan should contain fiber  Fiber is the part of grains, fruits, and vegetables that is not broken down by your body  Whole-grain foods are healthy and provide extra fiber in your diet  Some examples of whole-grain foods are whole-wheat breads and pastas, oatmeal, brown rice, and bulgur  Eat a variety of vegetables every day  Include dark, leafy greens such as spinach, kale, marco greens, and mustard greens   Eat yellow and orange vegetables such as carrots, sweet potatoes, and winter squash  Eat a variety of fruits every day  Choose fresh or canned fruit (canned in its own juice or light syrup) instead of juice  Fruit juice has very little or no fiber  Eat low-fat dairy foods  Drink fat-free (skim) milk or 1% milk  Eat fat-free yogurt and low-fat cottage cheese  Try low-fat cheeses such as mozzarella and other reduced-fat cheeses  Choose meat and other protein foods that are low in fat  Choose beans or other legumes such as split peas or lentils  Choose fish, skinless poultry (chicken or turkey), or lean cuts of red meat (beef or pork)  Before you cook meat or poultry, cut off any visible fat  Use less fat and oil  Try baking foods instead of frying them  Add less fat, such as margarine, sour cream, regular salad dressing, and mayonnaise to foods  Eat fewer high-fat foods  Some examples of high-fat foods include french fries, doughnuts, ice cream, and cakes  Eat fewer sweets  Limit foods and drinks that are high in sugar  This includes candy, cookies, regular soda, and sweetened drinks  Ways to decrease calories:   Eat smaller portions  Use a small plate with smaller servings  Do not eat second helpings  When you eat at a restaurant, ask for a box and place half of your meal in the box before you eat  Share an entrée with someone else  Replace high-calorie snacks with healthy, low-calorie snacks  Choose fresh fruit, vegetables, fat-free rice cakes, or air-popped popcorn instead of potato chips, nuts, or chocolate  Choose water or calorie-free drinks instead of soda or sweetened drinks  Do not shop for groceries when you are hungry  You may be more likely to make unhealthy food choices  Take a grocery list of healthy foods and shop after you have eaten  Eat regular meals  Do not skip meals  Skipping meals can lead to overeating later in the day  This can make it harder for you to lose weight   Eat a healthy snack in place of a meal if you do not have time to eat a regular meal  Talk with a dietitian to help you create a meal plan and schedule that is right for you  Other things to consider as you try to lose weight:   Be aware of situations that may give you the urge to overeat, such as eating while watching television  Find ways to avoid these situations  For example, read a book, go for a walk, or do crafts  Meet with a weight loss support group or friends who are also trying to lose weight  This may help you stay motivated to continue working on your weight loss goals  © Copyright [employer] 2022 Information is for End User's use only and may not be sold, redistributed or otherwise used for commercial purposes  All illustrations and images included in CareNotes® are the copyrighted property of A D A M , Inc  or Filipe Michael  The above information is an  only  It is not intended as medical advice for individual conditions or treatments  Talk to your doctor, nurse or pharmacist before following any medical regimen to see if it is safe and effective for you

## 2022-12-21 NOTE — PROGRESS NOTES
Assessment / Plan    1  Encounter for gynecological examination without abnormal finding  Normal well woman exam  Pap with HPV obtained  2  Encounter for Papanicolaou smear for cervical cancer screening    - Liquid-based pap, screening    3  Screening for HPV (human papillomavirus)    - Liquid-based pap, screening    4  Weight gain  Discussed nutrition / exercise  She is seeing a nutritionist   Will check TSH    - TSH, 3rd generation with Free T4 reflex; Future    5  Cyst of ovary, unspecified laterality  Check for resolution    - US pelvis complete w transvaginal; Future      Subjective      Amanda Mattson is a 30 y o  female who presents for her annual gynecologic exam     2019 ASCUS, neg HPV  Pap hx abn: remote  STD hx negative  Sexually active,     2020 Mirena IUD inserted  It was removed 24 & replaced with Paragard which ultimately expelled in 2022  Her  has since had a vasectomy  States that they saw an ovarian cyst on US which was done to check the IUD placement-- asking if we can recheck for resolution  Frustrated by lack of weight loss with dieting and exercise  She is seeing a nutritionist     Aurora Nicholson are regular   Current contraception: IUD  History of abnormal Pap smear: yes -   Family history of breast,uterine, ovarian or colon cancer: no      Menstrual History:  OB History        5    Para   3    Term   3       0    AB   2    Living   3       SAB   2    IAB   0    Ectopic   0    Multiple   0    Live Births   3           Obstetric Comments   Menarche 17              Patient's last menstrual period was 2022 (exact date)  The following portions of the patient's history were reviewed and updated as appropriate: allergies, current medications, past family history, past medical history, past social history, past surgical history and problem list     Review of Systems      Review of Systems   Constitutional: Negative for chills and fever  Respiratory: Negative for cough and shortness of breath  Gastrointestinal: Negative for abdominal distention, abdominal pain, blood in stool, constipation, diarrhea, nausea and vomiting  Genitourinary: Negative for difficulty urinating, dysuria, frequency, genital sores, hematuria, menstrual problem, pelvic pain, urgency, vaginal bleeding and vaginal discharge  Musculoskeletal: Negative for arthralgias and myalgias  Breasts:  Negative for skin changes, dimpling, asymmetry, nipple discharge, redness, tenderness or palpable masses    Objective      /80 (BP Location: Right arm, Patient Position: Sitting, Cuff Size: Standard)   Ht 5' 4" (1 626 m)   Wt 86 2 kg (190 lb)   LMP 11/24/2022 (Exact Date)   BMI 32 61 kg/m²   Physical Exam  Constitutional:       General: She is not in acute distress  Appearance: Normal appearance  She is well-developed  She is not ill-appearing or diaphoretic  Comments: bmi 32 6   HENT:      Head: Normocephalic and atraumatic  Eyes:      Pupils: Pupils are equal, round, and reactive to light  Neck:      Thyroid: No thyromegaly  Pulmonary:      Effort: Pulmonary effort is normal    Chest:   Breasts:     Breasts are symmetrical       Right: No inverted nipple, mass, nipple discharge, skin change or tenderness  Left: No inverted nipple, mass, nipple discharge, skin change or tenderness  Abdominal:      General: There is no distension  Palpations: Abdomen is soft  There is no mass  Tenderness: There is no abdominal tenderness  There is no guarding or rebound  Genitourinary:     General: Normal vulva  Exam position: Lithotomy position  Labia:         Right: No rash, tenderness, lesion or injury  Left: No rash, tenderness, lesion or injury  Vagina: No signs of injury and foreign body  No vaginal discharge, erythema, tenderness or bleeding  Cervix: No cervical motion tenderness, discharge or friability        Uterus: Not enlarged and not tender  Adnexa:         Right: No mass or tenderness  Left: No mass or tenderness  Musculoskeletal:      Cervical back: Neck supple  Lymphadenopathy:      Cervical: No cervical adenopathy  Upper Body:      Right upper body: No supraclavicular adenopathy  Left upper body: No supraclavicular adenopathy  Skin:     General: Skin is warm and dry  Neurological:      General: No focal deficit present  Mental Status: She is alert and oriented to person, place, and time  Psychiatric:         Mood and Affect: Mood normal          Behavior: Behavior normal          Thought Content:  Thought content normal          Judgment: Judgment normal

## 2022-12-28 LAB
LAB AP GYN PRIMARY INTERPRETATION: NORMAL
Lab: NORMAL

## 2023-02-06 ENCOUNTER — LAB (OUTPATIENT)
Dept: LAB | Age: 38
End: 2023-02-06

## 2023-02-06 ENCOUNTER — PATIENT MESSAGE (OUTPATIENT)
Dept: OBGYN CLINIC | Facility: CLINIC | Age: 38
End: 2023-02-06

## 2023-02-06 DIAGNOSIS — R63.5 WEIGHT GAIN: ICD-10-CM

## 2023-02-06 LAB
T4 FREE SERPL-MCNC: 0.41 NG/DL (ref 0.76–1.46)
TSH SERPL DL<=0.05 MIU/L-ACNC: 82.2 UIU/ML (ref 0.45–4.5)

## 2023-02-14 ENCOUNTER — TELEPHONE (OUTPATIENT)
Dept: OBGYN CLINIC | Facility: CLINIC | Age: 38
End: 2023-02-14

## 2023-02-14 NOTE — TELEPHONE ENCOUNTER
----- Message from Thomas Corrales sent at 2/13/2023  8:30 PM EST -----  Regarding: Esmer stallworth   Contact: [phone]  Good evening  I am desperate here  Due to my work schedule I had to reschedule my annual visit with you to another provider on [address]  In December I saw Joseline Heatsusan  I got her up to date with the IUD issue I had over the summer  I then expressed to her my weight gain and increased fatigue that I was concerned about over the summer with the IUD that had still not gone away even after six months without IUD  After arguing with me over a half hour and a lecture about portion control ( 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

## 2023-03-13 ENCOUNTER — APPOINTMENT (OUTPATIENT)
Dept: LAB | Facility: CLINIC | Age: 38
End: 2023-03-13

## 2023-03-13 DIAGNOSIS — Z13.6 SCREENING FOR ISCHEMIC HEART DISEASE: ICD-10-CM

## 2023-03-13 DIAGNOSIS — N95.1 SYMPTOMATIC MENOPAUSAL OR FEMALE CLIMACTERIC STATES: ICD-10-CM

## 2023-03-13 DIAGNOSIS — Z13.0 SCREENING FOR IRON DEFICIENCY ANEMIA: ICD-10-CM

## 2023-03-13 DIAGNOSIS — E55.9 AVITAMINOSIS D: ICD-10-CM

## 2023-03-13 DIAGNOSIS — R53.83 OTHER FATIGUE: ICD-10-CM

## 2023-03-13 DIAGNOSIS — E03.9 ACQUIRED HYPOTHYROIDISM: ICD-10-CM

## 2023-03-13 LAB
25(OH)D3 SERPL-MCNC: 17.4 NG/ML (ref 30–100)
ALBUMIN SERPL BCP-MCNC: 3.9 G/DL (ref 3.5–5)
ALP SERPL-CCNC: 70 U/L (ref 46–116)
ALT SERPL W P-5'-P-CCNC: 19 U/L (ref 12–78)
ANION GAP SERPL CALCULATED.3IONS-SCNC: 3 MMOL/L (ref 4–13)
AST SERPL W P-5'-P-CCNC: 12 U/L (ref 5–45)
BASOPHILS # BLD AUTO: 0.07 THOUSANDS/ÂΜL (ref 0–0.1)
BASOPHILS NFR BLD AUTO: 1 % (ref 0–1)
BILIRUB SERPL-MCNC: 0.37 MG/DL (ref 0.2–1)
BUN SERPL-MCNC: 13 MG/DL (ref 5–25)
CALCIUM SERPL-MCNC: 9.4 MG/DL (ref 8.3–10.1)
CHLORIDE SERPL-SCNC: 106 MMOL/L (ref 96–108)
CO2 SERPL-SCNC: 26 MMOL/L (ref 21–32)
CREAT SERPL-MCNC: 0.8 MG/DL (ref 0.6–1.3)
EOSINOPHIL # BLD AUTO: 0.04 THOUSAND/ÂΜL (ref 0–0.61)
EOSINOPHIL NFR BLD AUTO: 0 % (ref 0–6)
ERYTHROCYTE [DISTWIDTH] IN BLOOD BY AUTOMATED COUNT: 14.8 % (ref 11.6–15.1)
ESTRADIOL SERPL-MCNC: 101 PG/ML
FSH SERPL-ACNC: 3.8 MIU/ML
GFR SERPL CREATININE-BSD FRML MDRD: 95 ML/MIN/1.73SQ M
GLUCOSE P FAST SERPL-MCNC: 78 MG/DL (ref 65–99)
HCT VFR BLD AUTO: 43.2 % (ref 34.8–46.1)
HGB BLD-MCNC: 13.2 G/DL (ref 11.5–15.4)
IMM GRANULOCYTES # BLD AUTO: 0.02 THOUSAND/UL (ref 0–0.2)
IMM GRANULOCYTES NFR BLD AUTO: 0 % (ref 0–2)
LYMPHOCYTES # BLD AUTO: 2.81 THOUSANDS/ÂΜL (ref 0.6–4.47)
LYMPHOCYTES NFR BLD AUTO: 31 % (ref 14–44)
MCH RBC QN AUTO: 28.7 PG (ref 26.8–34.3)
MCHC RBC AUTO-ENTMCNC: 30.6 G/DL (ref 31.4–37.4)
MCV RBC AUTO: 94 FL (ref 82–98)
MONOCYTES # BLD AUTO: 0.72 THOUSAND/ÂΜL (ref 0.17–1.22)
MONOCYTES NFR BLD AUTO: 8 % (ref 4–12)
NEUTROPHILS # BLD AUTO: 5.29 THOUSANDS/ÂΜL (ref 1.85–7.62)
NEUTS SEG NFR BLD AUTO: 60 % (ref 43–75)
NRBC BLD AUTO-RTO: 0 /100 WBCS
PLATELET # BLD AUTO: 299 THOUSANDS/UL (ref 149–390)
PMV BLD AUTO: 10.3 FL (ref 8.9–12.7)
POTASSIUM SERPL-SCNC: 4.1 MMOL/L (ref 3.5–5.3)
PROT SERPL-MCNC: 7.9 G/DL (ref 6.4–8.4)
RBC # BLD AUTO: 4.6 MILLION/UL (ref 3.81–5.12)
SODIUM SERPL-SCNC: 135 MMOL/L (ref 135–147)
TSH SERPL DL<=0.05 MIU/L-ACNC: 0.42 UIU/ML (ref 0.45–4.5)
WBC # BLD AUTO: 8.95 THOUSAND/UL (ref 4.31–10.16)

## 2023-05-03 ENCOUNTER — APPOINTMENT (OUTPATIENT)
Dept: LAB | Facility: CLINIC | Age: 38
End: 2023-05-03

## 2023-05-03 ENCOUNTER — TRANSCRIBE ORDERS (OUTPATIENT)
Dept: LAB | Facility: CLINIC | Age: 38
End: 2023-05-03

## 2023-05-03 DIAGNOSIS — E55.9 VITAMIN D DEFICIENCY, UNSPECIFIED: Primary | ICD-10-CM

## 2023-05-03 DIAGNOSIS — E03.9 HYPOTHYROIDISM, ADULT: ICD-10-CM

## 2023-05-03 DIAGNOSIS — E55.9 VITAMIN D DEFICIENCY, UNSPECIFIED: ICD-10-CM

## 2023-05-03 LAB
25(OH)D3 SERPL-MCNC: 25.4 NG/ML (ref 30–100)
T4 FREE SERPL-MCNC: 1.6 NG/DL (ref 0.76–1.46)
TSH SERPL DL<=0.05 MIU/L-ACNC: 0.01 UIU/ML (ref 0.45–4.5)

## 2023-05-05 ENCOUNTER — OFFICE VISIT (OUTPATIENT)
Dept: URGENT CARE | Facility: CLINIC | Age: 38
End: 2023-05-05

## 2023-05-05 VITALS
BODY MASS INDEX: 29.87 KG/M2 | HEART RATE: 98 BPM | OXYGEN SATURATION: 98 % | RESPIRATION RATE: 18 BRPM | TEMPERATURE: 98.2 F | WEIGHT: 174 LBS

## 2023-05-05 DIAGNOSIS — N39.0 URINARY TRACT INFECTION WITHOUT HEMATURIA, SITE UNSPECIFIED: Primary | ICD-10-CM

## 2023-05-05 LAB
SL AMB  POCT GLUCOSE, UA: ABNORMAL
SL AMB LEUKOCYTE ESTERASE,UA: ABNORMAL
SL AMB POCT BILIRUBIN,UA: ABNORMAL
SL AMB POCT BLOOD,UA: ABNORMAL
SL AMB POCT CLARITY,UA: CLEAR
SL AMB POCT COLOR,UA: ABNORMAL
SL AMB POCT KETONES,UA: ABNORMAL
SL AMB POCT NITRITE,UA: ABNORMAL
SL AMB POCT PH,UA: ABNORMAL
SL AMB POCT SPECIFIC GRAVITY,UA: ABNORMAL
SL AMB POCT URINE PROTEIN: ABNORMAL
SL AMB POCT UROBILINOGEN: ABNORMAL

## 2023-05-05 RX ORDER — CEPHALEXIN 500 MG/1
500 CAPSULE ORAL EVERY 8 HOURS SCHEDULED
Qty: 30 CAPSULE | Refills: 0 | Status: SHIPPED | OUTPATIENT
Start: 2023-05-05 | End: 2023-05-15

## 2023-05-05 NOTE — PROGRESS NOTES
[facility]        NAME: Nilson Ponce is a 41 y o  female  : 1985    MRN: [id number]  DATE: May 5, 2023  TIME: 6:55 PM    Pulse 98   Temp 98 2 °F (36 8 °C)   Resp 18   Wt 78 9 kg (174 lb)   SpO2 98%   BMI 29 87 kg/m²     Assessment and Plan   Urinary tract infection without hematuria, site unspecified [N39 0]  1  Urinary tract infection without hematuria, site unspecified  cephalexin (KEFLEX) 500 mg capsule            Patient Instructions       Follow up with PCP in 3-5 days  Proceed to  ER if symptoms worsen  Chief Complaint   No chief complaint on file  History of Present Illness       Pt with burning with urination x 1 day, pt with pelvis pressure      Review of Systems   Review of Systems   Constitutional: Negative  HENT: Negative  Eyes: Negative  Respiratory: Negative  Cardiovascular: Negative  Gastrointestinal: Negative  All other systems reviewed and are negative          Current Medications       Current Outpatient Medications:     cephalexin (KEFLEX) 500 mg capsule, Take 1 capsule (500 mg total) by mouth every 8 (eight) hours for 10 days, Disp: 30 capsule, Rfl: 0    multivitamin (THERAGRAN) TABS, Take 1 tablet by mouth daily, Disp: , Rfl:     Current Allergies     Allergies as of 2023    (No Known Allergies)            The following portions of the patient's history were reviewed and updated as appropriate: allergies, current medications, past family history, past medical history, past social history, past surgical history and problem list      Past Medical History:   Diagnosis Date    Abnormal Pap smear of cervix     2019 ASCUS/neg HPV    Gestational diabetes     insulin controlled    HPV (human papilloma virus) infection     Miscarriage 2018    Recurrent pregnancy loss, antepartum condition or complication     Varicella        Past Surgical History:   Procedure Laterality Date    APPENDECTOMY       SECTION      COLPOSCOPY early 25s    IN  DELIVERY ONLY N/A 2017    Procedure:  SECTION () REPEAT;  Surgeon: Jose Carlos Oates MD;  Location: [facility];  Service: Obstetrics    IN  DELIVERY ONLY N/A 2019    Procedure:  SECTION () REPEAT;  Surgeon: Macey Dixon MD;  Location: [facility];  Service: Obstetrics       Family History   Problem Relation Age of Onset    Stroke Maternal Grandfather     Cancer Maternal Grandfather         lymph node    Cancer Paternal Grandmother         brain tumor    No Known Problems Mother     Stroke Father     Hypertension Father     No Known Problems Sister     No Known Problems Brother     No Known Problems Son     No Known Problems Maternal Grandmother     No Known Problems Paternal Grandfather     No Known Problems Son     Ovarian cancer Neg Hx     Colon cancer Neg Hx     Breast cancer Neg Hx     Uterine cancer Neg Hx          Medications have been verified  Objective   Pulse 98   Temp 98 2 °F (36 8 °C)   Resp 18   Wt 78 9 kg (174 lb)   SpO2 98%   BMI 29 87 kg/m²        Physical Exam     Physical Exam  Vitals and nursing note reviewed  Constitutional:       Appearance: Normal appearance  She is normal weight  HENT:      Head: Normocephalic and atraumatic  Right Ear: Tympanic membrane, ear canal and external ear normal       Left Ear: Tympanic membrane, ear canal and external ear normal       Nose: Nose normal       Mouth/Throat:      Mouth: Mucous membranes are moist       Pharynx: Oropharynx is clear  Cardiovascular:      Rate and Rhythm: Normal rate and regular rhythm  Pulses: Normal pulses  Heart sounds: Normal heart sounds  Pulmonary:      Effort: Pulmonary effort is normal       Breath sounds: Normal breath sounds  Abdominal:      General: Abdomen is flat  Bowel sounds are normal       Palpations: Abdomen is soft  Musculoskeletal:         General: Normal range of motion  Cervical back: Normal range of motion and neck supple  Neurological:      Mental Status: She is alert

## 2023-05-07 LAB — BACTERIA UR CULT: ABNORMAL

## 2023-05-08 LAB — BACTERIA UR CULT: ABNORMAL

## 2023-05-17 LAB — T3REVERSE SERPL-MCNC: 23.8 NG/DL (ref 9.2–24.1)

## 2023-09-22 ENCOUNTER — APPOINTMENT (OUTPATIENT)
Dept: LAB | Facility: CLINIC | Age: 38
End: 2023-09-22
Payer: COMMERCIAL

## 2023-09-22 ENCOUNTER — TRANSCRIBE ORDERS (OUTPATIENT)
Dept: LAB | Facility: CLINIC | Age: 38
End: 2023-09-22

## 2023-09-22 DIAGNOSIS — Z13.6 SCREENING FOR ISCHEMIC HEART DISEASE: ICD-10-CM

## 2023-09-22 DIAGNOSIS — E03.9 HYPOTHYROIDISM, ADULT: Primary | ICD-10-CM

## 2023-09-22 DIAGNOSIS — R94.5 NONSPECIFIC ABNORMAL RESULTS OF LIVER FUNCTION STUDY: ICD-10-CM

## 2023-09-22 DIAGNOSIS — E03.9 HYPOTHYROIDISM, ADULT: ICD-10-CM

## 2023-09-22 LAB
ALBUMIN SERPL BCP-MCNC: 4 G/DL (ref 3.5–5)
ALP SERPL-CCNC: 72 U/L (ref 34–104)
ALT SERPL W P-5'-P-CCNC: 13 U/L (ref 7–52)
ANION GAP SERPL CALCULATED.3IONS-SCNC: 6 MMOL/L
AST SERPL W P-5'-P-CCNC: 15 U/L (ref 13–39)
BILIRUB SERPL-MCNC: 0.35 MG/DL (ref 0.2–1)
BUN SERPL-MCNC: 14 MG/DL (ref 5–25)
CALCIUM SERPL-MCNC: 9.4 MG/DL (ref 8.4–10.2)
CHLORIDE SERPL-SCNC: 105 MMOL/L (ref 96–108)
CO2 SERPL-SCNC: 28 MMOL/L (ref 21–32)
CREAT SERPL-MCNC: 0.67 MG/DL (ref 0.6–1.3)
GFR SERPL CREATININE-BSD FRML MDRD: 113 ML/MIN/1.73SQ M
GLUCOSE SERPL-MCNC: 80 MG/DL (ref 65–140)
HAV IGM SER QL: NORMAL
HBV CORE IGM SER QL: NORMAL
HBV SURFACE AG SER QL: NORMAL
HCV AB SER QL: NORMAL
POTASSIUM SERPL-SCNC: 4.4 MMOL/L (ref 3.5–5.3)
PROT SERPL-MCNC: 7.2 G/DL (ref 6.4–8.4)
SODIUM SERPL-SCNC: 139 MMOL/L (ref 135–147)
T4 FREE SERPL-MCNC: 1.06 NG/DL (ref 0.61–1.12)
TSH SERPL DL<=0.05 MIU/L-ACNC: 0.04 UIU/ML (ref 0.45–4.5)

## 2023-09-22 PROCEDURE — 36415 COLL VENOUS BLD VENIPUNCTURE: CPT

## 2023-09-22 PROCEDURE — 80053 COMPREHEN METABOLIC PANEL: CPT

## 2023-09-22 PROCEDURE — 80074 ACUTE HEPATITIS PANEL: CPT

## 2023-09-22 PROCEDURE — 84443 ASSAY THYROID STIM HORMONE: CPT

## 2023-09-22 PROCEDURE — 84439 ASSAY OF FREE THYROXINE: CPT

## 2023-12-08 ENCOUNTER — TRANSCRIBE ORDERS (OUTPATIENT)
Dept: LAB | Facility: CLINIC | Age: 38
End: 2023-12-08

## 2023-12-08 ENCOUNTER — APPOINTMENT (OUTPATIENT)
Dept: LAB | Facility: CLINIC | Age: 38
End: 2023-12-08
Payer: COMMERCIAL

## 2023-12-08 DIAGNOSIS — E03.9 HYPOTHYROIDISM, ADULT: ICD-10-CM

## 2023-12-08 DIAGNOSIS — E03.9 HYPOTHYROIDISM, ADULT: Primary | ICD-10-CM

## 2023-12-08 LAB
T4 FREE SERPL-MCNC: 0.88 NG/DL (ref 0.61–1.12)
TSH SERPL DL<=0.05 MIU/L-ACNC: 0.04 UIU/ML (ref 0.45–4.5)

## 2023-12-08 PROCEDURE — 36415 COLL VENOUS BLD VENIPUNCTURE: CPT

## 2023-12-08 PROCEDURE — 84443 ASSAY THYROID STIM HORMONE: CPT

## 2023-12-08 PROCEDURE — 84439 ASSAY OF FREE THYROXINE: CPT

## 2024-03-08 ENCOUNTER — APPOINTMENT (OUTPATIENT)
Dept: LAB | Facility: CLINIC | Age: 39
End: 2024-03-08
Payer: COMMERCIAL

## 2024-03-08 ENCOUNTER — TRANSCRIBE ORDERS (OUTPATIENT)
Dept: LAB | Facility: CLINIC | Age: 39
End: 2024-03-08

## 2024-03-08 DIAGNOSIS — I51.9 MYXEDEMA HEART DISEASE: Primary | ICD-10-CM

## 2024-03-08 DIAGNOSIS — E03.9 MYXEDEMA HEART DISEASE: ICD-10-CM

## 2024-03-08 DIAGNOSIS — I51.9 MYXEDEMA HEART DISEASE: ICD-10-CM

## 2024-03-08 DIAGNOSIS — E03.9 MYXEDEMA HEART DISEASE: Primary | ICD-10-CM

## 2024-03-08 LAB
T4 FREE SERPL-MCNC: 0.94 NG/DL (ref 0.61–1.12)
TSH SERPL DL<=0.05 MIU/L-ACNC: 0.13 UIU/ML (ref 0.45–4.5)

## 2024-03-08 PROCEDURE — 84439 ASSAY OF FREE THYROXINE: CPT

## 2024-03-08 PROCEDURE — 36415 COLL VENOUS BLD VENIPUNCTURE: CPT

## 2024-03-08 PROCEDURE — 84443 ASSAY THYROID STIM HORMONE: CPT

## 2024-05-09 ENCOUNTER — TELEPHONE (OUTPATIENT)
Dept: URGENT CARE | Facility: CLINIC | Age: 39
End: 2024-05-09

## 2024-06-20 ENCOUNTER — APPOINTMENT (OUTPATIENT)
Dept: LAB | Facility: CLINIC | Age: 39
End: 2024-06-20
Payer: COMMERCIAL

## 2024-06-20 ENCOUNTER — TRANSCRIBE ORDERS (OUTPATIENT)
Dept: LAB | Facility: CLINIC | Age: 39
End: 2024-06-20

## 2024-06-20 DIAGNOSIS — E03.8 SECONDARY HYPOTHYROIDISM: Primary | ICD-10-CM

## 2024-06-20 DIAGNOSIS — E03.8 SECONDARY HYPOTHYROIDISM: ICD-10-CM

## 2024-06-20 LAB
T4 FREE SERPL-MCNC: 0.76 NG/DL (ref 0.61–1.12)
TSH SERPL DL<=0.05 MIU/L-ACNC: 1.25 UIU/ML (ref 0.45–4.5)

## 2024-06-20 PROCEDURE — 36415 COLL VENOUS BLD VENIPUNCTURE: CPT

## 2024-06-20 PROCEDURE — 84443 ASSAY THYROID STIM HORMONE: CPT

## 2024-06-20 PROCEDURE — 84439 ASSAY OF FREE THYROXINE: CPT

## 2025-03-01 ENCOUNTER — OFFICE VISIT (OUTPATIENT)
Dept: URGENT CARE | Facility: CLINIC | Age: 40
End: 2025-03-01
Payer: COMMERCIAL

## 2025-03-01 VITALS
RESPIRATION RATE: 18 BRPM | TEMPERATURE: 98.6 F | BODY MASS INDEX: 29.56 KG/M2 | DIASTOLIC BLOOD PRESSURE: 74 MMHG | SYSTOLIC BLOOD PRESSURE: 116 MMHG | OXYGEN SATURATION: 99 % | WEIGHT: 172.2 LBS | HEART RATE: 99 BPM

## 2025-03-01 DIAGNOSIS — J20.9 ACUTE BRONCHITIS, UNSPECIFIED ORGANISM: Primary | ICD-10-CM

## 2025-03-01 PROCEDURE — G0382 LEV 3 HOSP TYPE B ED VISIT: HCPCS

## 2025-03-01 RX ORDER — METHYLPREDNISOLONE 4 MG/1
TABLET ORAL
Qty: 21 TABLET | Refills: 0 | Status: SHIPPED | OUTPATIENT
Start: 2025-03-01

## 2025-03-01 RX ORDER — AZITHROMYCIN 250 MG/1
TABLET, FILM COATED ORAL
Qty: 6 TABLET | Refills: 0 | Status: SHIPPED | OUTPATIENT
Start: 2025-03-01 | End: 2025-03-05

## 2025-03-01 RX ORDER — ALBUTEROL SULFATE 90 UG/1
2 INHALANT RESPIRATORY (INHALATION) EVERY 6 HOURS PRN
Qty: 8.5 G | Refills: 0 | Status: SHIPPED | OUTPATIENT
Start: 2025-03-01

## 2025-03-01 NOTE — PROGRESS NOTES
[facility]        NAME: Esmer Apple is a 39 y.o. female  : 1985    MRN: [id number]  DATE: 2025  TIME: 2:08 PM    Assessment and Plan   Acute bronchitis, unspecified organism [J20.9]  1. Acute bronchitis, unspecified organism  azithromycin (ZITHROMAX) 250 mg tablet    methylPREDNISolone 4 MG tablet therapy pack    albuterol (ProAir HFA) 90 mcg/act inhaler        Ongoing cough, congestion , chest tightness. Others sick at home however her symptoms have worsened.     Patient Instructions       Follow up with PCP in 3-5 days.  Proceed to  ER if symptoms worsen.    If tests have been performed at [facility], our office will contact you with results if changes need to be made to the care plan discussed with you at the visit.  [facility]    Chief Complaint     Chief Complaint   Patient presents with    Flu Symptoms     Started a week ago with fatigue, sore throat, congestion, congested productive cough and decreased appetite         History of Present Illness       Ongoing cough, congestion , chest tightness. Others sick at home however her symptoms have worsened.     Flu Symptoms  Associated symptoms include congestion, fatigue, coughing and shortness of breath. Pertinent negatives include no fever.       Review of Systems   Review of Systems   Constitutional:  Positive for chills and fatigue. Negative for fever.   HENT:  Positive for congestion and postnasal drip.    Respiratory:  Positive for cough, chest tightness and shortness of breath.          Current Medications       Current Outpatient Medications:     albuterol (ProAir HFA) 90 mcg/act inhaler, Inhale 2 puffs every 6 (six) hours as needed for wheezing or shortness of breath, Disp: 8.5 g, Rfl: 0    azithromycin (ZITHROMAX) 250 mg tablet, Take 2 tablets today then 1 tablet daily x 4 days, Disp: 6 tablet, Rfl: 0    methylPREDNISolone 4 MG tablet therapy pack, Use as directed on package,  Disp: 21 tablet, Rfl: 0    multivitamin (THERAGRAN) TABS, Take 1 tablet by mouth daily, Disp: , Rfl:     Current Allergies     Allergies as of 2025    (No Known Allergies)            The following portions of the patient's history were reviewed and updated as appropriate: allergies, current medications, past family history, past medical history, past social history, past surgical history and problem list.     Past Medical History:   Diagnosis Date    Abnormal Pap smear of cervix     2019 ASCUS/neg HPV    Gestational diabetes     insulin controlled    HPV (human papilloma virus) infection     Miscarriage 2018    Recurrent pregnancy loss, antepartum condition or complication     Varicella        Past Surgical History:   Procedure Laterality Date    APPENDECTOMY       SECTION      COLPOSCOPY      early 20s    [place]  DELIVERY ONLY N/A 2017    Procedure:  SECTION () REPEAT;  Surgeon: Perfecto Stevenson MD;  Location: [facility];  Service: Obstetrics    [place]  DELIVERY ONLY N/A 2019    Procedure:  SECTION () REPEAT;  Surgeon: Siobhan Fernando MD;  Location: [facility];  Service: Obstetrics       Family History   Problem Relation Age of Onset    Stroke Maternal Grandfather     Cancer Maternal Grandfather         lymph node    Cancer Paternal Grandmother         brain tumor    No Known Problems Mother     Stroke Father     Hypertension Father     No Known Problems Sister     No Known Problems Brother     No Known Problems Son     No Known Problems Maternal Grandmother     No Known Problems Paternal Grandfather     No Known Problems Son     Ovarian cancer Neg Hx     Colon cancer Neg Hx     Breast cancer Neg Hx     Uterine cancer Neg Hx          Medications have been verified.        Objective   /74 (BP Location: Right arm, Patient Position: Sitting)   Pulse 99   Temp 98.6 °F (37 °C) (Tympanic)   Resp 18   Wt 78.1 kg (172 lb 3.2 oz)   LMP  02/24/2025 (Exact Date)   SpO2 99%   Breastfeeding No   BMI 29.56 kg/m²   Patient's last menstrual period was 02/24/2025 (exact date).       Physical Exam     Physical Exam  Vitals reviewed.   Constitutional:       Appearance: Normal appearance.   Cardiovascular:      Rate and Rhythm: Normal rate and regular rhythm.      Pulses: Normal pulses.      Heart sounds: Normal heart sounds.   Pulmonary:      Effort: Pulmonary effort is normal.      Breath sounds: Wheezing and rhonchi present.   Lymphadenopathy:      Cervical: Cervical adenopathy present.   Neurological:      Mental Status: She is alert.

## 2025-04-22 ENCOUNTER — TELEPHONE (OUTPATIENT)
Dept: DERMATOLOGY | Facility: CLINIC | Age: 40
End: 2025-04-22

## 2025-04-22 NOTE — TELEPHONE ENCOUNTER
Called patient regarding her appointment on Thursday, 4/24. The insurance on file is showing as inactive. Left a message for patient to call the office with updated insurance information.

## 2025-04-24 ENCOUNTER — OFFICE VISIT (OUTPATIENT)
Dept: DERMATOLOGY | Facility: CLINIC | Age: 40
End: 2025-04-24
Payer: COMMERCIAL

## 2025-04-24 DIAGNOSIS — D22.71 MULTIPLE BENIGN MELANOCYTIC NEVI OF BOTH UPPER EXTREMITIES, BOTH LOWER EXTREMITIES, AND TRUNK: ICD-10-CM

## 2025-04-24 DIAGNOSIS — D22.72 MULTIPLE BENIGN MELANOCYTIC NEVI OF BOTH UPPER EXTREMITIES, BOTH LOWER EXTREMITIES, AND TRUNK: ICD-10-CM

## 2025-04-24 DIAGNOSIS — D22.61 MULTIPLE BENIGN MELANOCYTIC NEVI OF BOTH UPPER EXTREMITIES, BOTH LOWER EXTREMITIES, AND TRUNK: ICD-10-CM

## 2025-04-24 DIAGNOSIS — D18.01 CHERRY ANGIOMA: ICD-10-CM

## 2025-04-24 DIAGNOSIS — D22.62 MULTIPLE BENIGN MELANOCYTIC NEVI OF BOTH UPPER EXTREMITIES, BOTH LOWER EXTREMITIES, AND TRUNK: ICD-10-CM

## 2025-04-24 DIAGNOSIS — L81.4 SOLAR LENTIGO: ICD-10-CM

## 2025-04-24 DIAGNOSIS — Z12.83 SKIN EXAM, SCREENING FOR CANCER: Primary | ICD-10-CM

## 2025-04-24 DIAGNOSIS — D22.5 MULTIPLE BENIGN MELANOCYTIC NEVI OF BOTH UPPER EXTREMITIES, BOTH LOWER EXTREMITIES, AND TRUNK: ICD-10-CM

## 2025-04-24 DIAGNOSIS — D23.9 DERMATOFIBROMA: ICD-10-CM

## 2025-04-24 PROCEDURE — 99203 OFFICE O/P NEW LOW 30 MIN: CPT | Performed by: DERMATOLOGY

## 2025-04-24 NOTE — PROGRESS NOTES
"[facility] Note     Patient Name: Esmer Apple  Encounter Date: 4/24/2025       Have you been cared for by a [facility] Dermatologist in the last 3 years and, if so, which description applies to you? NO. I am considered a \"new\" patient and must complete all patient intake questions. I am of child-bearing potential.     REVIEW OF SYSTEMS:  Have you recently had or currently have any of the following? Recent fever or chills? No  Any non-healing wound? No  Are you pregnant or planning to become pregnant? No  Are you currently or planning to be nursing or breast feeding? No   PAST MEDICAL HISTORY:  Have you personally ever had or currently have any of the following?  If \"YES,\" then please provide more detail. Skin cancer (such as Melanoma, Basal Cell Carcinoma, Squamous Cell Carcinoma?  No  Tuberculosis, HIV/AIDS, Hepatitis B or C: No  Radiation Treatment No   HISTORY OF IMMUNOSUPPRESSION:   Do you have a history of any of the following:  Systemic Immunosuppression such as Diabetes, Biologic or Immunotherapy, Chemotherapy, Organ Transplantation, Bone Marrow Transplantation or Prednsione?  No    Answering \"YES\" requires the addition of the dotphrase \"IMMUNOSUPPRESSED\" as the first diagnosis of the patient's visit.   FAMILY HISTORY:  Any \"first degree relatives\" (parent, brother, sister, or child) with the following?    Skin Cancer, Pancreatic or Other Cancer? YES, parents - unsure of type   PATIENT EXPERIENCE:    Do you want the Dermatologist to perform a COMPLETE skin exam today including a clinical examination under the \"bra and underwear\" areas?  Yes  If necessary, do we have your permission to call and leave a detailed message on your Preferred Phone number that includes your specific medical information?  Yes      No Known Allergies   Current Outpatient Medications:     albuterol (ProAir HFA) 90 mcg/act inhaler, Inhale 2 puffs every 6 (six) hours as needed for wheezing or shortness of breath, " Disp: 8.5 g, Rfl: 0    Cholecalciferol 10 MCG (400 UNIT) CHEW, Chew, Disp: , Rfl:     Levothyroxine Sodium (SYNTHROID PO), , Disp: , Rfl:     methylPREDNISolone 4 MG tablet therapy pack, Use as directed on package (Patient not taking: Reported on 4/24/2025), Disp: 21 tablet, Rfl: 0    multivitamin (THERAGRAN) TABS, Take 1 tablet by mouth daily (Patient not taking: Reported on 4/24/2025), Disp: , Rfl:         Whom besides the patient is providing clinical information about today's encounter?   NO ADDITIONAL HISTORIAN (patient alone provided history)    Physical Exam and Assessment/Plan by Diagnosis:    SEBORRHEIC KERATOSES  - Relevant exam: Scattered over the trunk/extremities are waxy brown to black plaques and papules with stuck on appearance and consistent dermoscopy  - Exam and clinical history consistent with seborrheic keratoses  - Counseled that these are benign growths that do not require treatment    MELANOCYTIC NEVI  -Relevant exam: Scattered over the trunk/extremities are homogenously pigmented brown macules and papules. ELM performed and without concerning findings. No outliers unless otherwise noted in today's note  - Exam and clinical history consistent with melanocytic nevi  - Counseled to return to clinic prior to scheduled appointment should any of these lesions change or should any new lesions of concern arise  - Counseled on use of sun protection daily. Reviewed latest FDA sunscreen guidelines, including use of broad spectrum (UVA and UVB blocking) sunscreen or sun protective clothing with SPF 30-50 every 2-3 hours and reapplied after exposure to water    LENTIGINES  OTHER SKIN CHANGES DUE TO CHRONIC EXPOSURE TO NONIONIZING RADIATION  - Relevant exam: Over sun exposed areas are brown macules. ELM performed and without concerning findings.  - Exam and clinical history consistent with lentigines.  - Counseled to return to clinic prior to scheduled appointment should any of these lesions change or  should any new lesions of concern arise.  - Recommended use of sunscreen as above and below.    CHERRY ANGIOMAS  - Relevant exam: Scattered over the trunk/extremities are red papules  - Exam and clinical history consistent with cherry angiomas  - Educated that these are benign    DERMATOFIBROMA    Physical Exam:  Anatomic Location Affected:  bilateral shins  Morphological Description:  firm pink papules with hyperpigmented rim and consistent dermoscopy  Pertinent Positives:  Pertinent Negatives:    Additional History of Present Condition:  present for years    Assessment and Plan:  Based on a thorough discussion of this condition and the management approach to it (including a comprehensive discussion of the known risks, side effects and potential benefits of treatment), the patient (family) agrees to implement the following specific plan:  Reassured benign  Follow up for changes    Scribe Attestation      I,:  Azul Tracy MA am acting as a scribe while in the presence of the attending physician.:       I,:  Cassie Lan MD personally performed the services described in this documentation    as scribed in my presence.:           Shannon Joshi MD  Dermatology, PGY-2

## 2025-05-02 ENCOUNTER — TRANSCRIBE ORDERS (OUTPATIENT)
Dept: LAB | Facility: CLINIC | Age: 40
End: 2025-05-02

## 2025-05-02 ENCOUNTER — APPOINTMENT (OUTPATIENT)
Dept: LAB | Facility: CLINIC | Age: 40
End: 2025-05-02
Payer: COMMERCIAL

## 2025-05-02 DIAGNOSIS — D51.9 ANEMIA DUE TO VITAMIN B12 DEFICIENCY, UNSPECIFIED B12 DEFICIENCY TYPE: ICD-10-CM

## 2025-05-02 DIAGNOSIS — Z00.00 ROUTINE GENERAL MEDICAL EXAMINATION AT A HEALTH CARE FACILITY: ICD-10-CM

## 2025-05-02 DIAGNOSIS — Z00.00 ROUTINE GENERAL MEDICAL EXAMINATION AT A HEALTH CARE FACILITY: Primary | ICD-10-CM

## 2025-05-02 DIAGNOSIS — E55.9 VITAMIN D DEFICIENCY, UNSPECIFIED: ICD-10-CM

## 2025-05-02 DIAGNOSIS — Z79.899 ENCOUNTER FOR LONG-TERM (CURRENT) USE OF MEDICATIONS: ICD-10-CM

## 2025-05-02 LAB
25(OH)D3 SERPL-MCNC: 22.9 NG/ML (ref 30–100)
ALBUMIN SERPL BCG-MCNC: 4 G/DL (ref 3.5–5)
ALP SERPL-CCNC: 57 U/L (ref 34–104)
ALT SERPL W P-5'-P-CCNC: 9 U/L (ref 7–52)
ANION GAP SERPL CALCULATED.3IONS-SCNC: 6 MMOL/L (ref 4–13)
AST SERPL W P-5'-P-CCNC: 16 U/L (ref 13–39)
BASOPHILS # BLD AUTO: 0.08 THOUSANDS/ÂΜL (ref 0–0.1)
BASOPHILS NFR BLD AUTO: 1 % (ref 0–1)
BILIRUB SERPL-MCNC: 0.31 MG/DL (ref 0.2–1)
BUN SERPL-MCNC: 18 MG/DL (ref 5–25)
CALCIUM SERPL-MCNC: 8.8 MG/DL (ref 8.4–10.2)
CHLORIDE SERPL-SCNC: 106 MMOL/L (ref 96–108)
CHOLEST SERPL-MCNC: 213 MG/DL (ref ?–200)
CO2 SERPL-SCNC: 27 MMOL/L (ref 21–32)
CORTIS SERPL-MCNC: 10.2 UG/DL
CREAT SERPL-MCNC: 0.74 MG/DL (ref 0.6–1.3)
EOSINOPHIL # BLD AUTO: 0.08 THOUSAND/ÂΜL (ref 0–0.61)
EOSINOPHIL NFR BLD AUTO: 1 % (ref 0–6)
ERYTHROCYTE [DISTWIDTH] IN BLOOD BY AUTOMATED COUNT: 15.1 % (ref 11.6–15.1)
EST. AVERAGE GLUCOSE BLD GHB EST-MCNC: 126 MG/DL
FOLATE SERPL-MCNC: 8 NG/ML
GFR SERPL CREATININE-BSD FRML MDRD: 103 ML/MIN/1.73SQ M
GLUCOSE P FAST SERPL-MCNC: 103 MG/DL (ref 65–99)
HBA1C MFR BLD: 6 %
HCT VFR BLD AUTO: 37.8 % (ref 34.8–46.1)
HDLC SERPL-MCNC: 59 MG/DL
HGB BLD-MCNC: 12 G/DL (ref 11.5–15.4)
IMM GRANULOCYTES # BLD AUTO: 0.01 THOUSAND/UL (ref 0–0.2)
IMM GRANULOCYTES NFR BLD AUTO: 0 % (ref 0–2)
LDLC SERPL CALC-MCNC: 135 MG/DL (ref 0–100)
LYMPHOCYTES # BLD AUTO: 2.02 THOUSANDS/ÂΜL (ref 0.6–4.47)
LYMPHOCYTES NFR BLD AUTO: 31 % (ref 14–44)
MCH RBC QN AUTO: 27.5 PG (ref 26.8–34.3)
MCHC RBC AUTO-ENTMCNC: 31.7 G/DL (ref 31.4–37.4)
MCV RBC AUTO: 87 FL (ref 82–98)
MONOCYTES # BLD AUTO: 0.42 THOUSAND/ÂΜL (ref 0.17–1.22)
MONOCYTES NFR BLD AUTO: 7 % (ref 4–12)
NEUTROPHILS # BLD AUTO: 3.83 THOUSANDS/ÂΜL (ref 1.85–7.62)
NEUTS SEG NFR BLD AUTO: 60 % (ref 43–75)
NONHDLC SERPL-MCNC: 154 MG/DL
NRBC BLD AUTO-RTO: 0 /100 WBCS
PLATELET # BLD AUTO: 309 THOUSANDS/UL (ref 149–390)
PMV BLD AUTO: 9.7 FL (ref 8.9–12.7)
POTASSIUM SERPL-SCNC: 4 MMOL/L (ref 3.5–5.3)
PROT SERPL-MCNC: 6.8 G/DL (ref 6.4–8.4)
RBC # BLD AUTO: 4.37 MILLION/UL (ref 3.81–5.12)
SODIUM SERPL-SCNC: 139 MMOL/L (ref 135–147)
T4 FREE SERPL-MCNC: 0.66 NG/DL (ref 0.61–1.12)
TRIGL SERPL-MCNC: 94 MG/DL (ref ?–150)
TSH SERPL DL<=0.05 MIU/L-ACNC: 6.83 UIU/ML (ref 0.45–4.5)
VIT B12 SERPL-MCNC: 567 PG/ML (ref 180–914)
WBC # BLD AUTO: 6.44 THOUSAND/UL (ref 4.31–10.16)

## 2025-05-02 PROCEDURE — 82533 TOTAL CORTISOL: CPT

## 2025-05-02 PROCEDURE — 82306 VITAMIN D 25 HYDROXY: CPT

## 2025-05-02 PROCEDURE — 84425 ASSAY OF VITAMIN B-1: CPT

## 2025-05-02 PROCEDURE — 36415 COLL VENOUS BLD VENIPUNCTURE: CPT

## 2025-05-02 PROCEDURE — 82746 ASSAY OF FOLIC ACID SERUM: CPT

## 2025-05-02 PROCEDURE — 83036 HEMOGLOBIN GLYCOSYLATED A1C: CPT

## 2025-05-02 PROCEDURE — 80053 COMPREHEN METABOLIC PANEL: CPT

## 2025-05-02 PROCEDURE — 82607 VITAMIN B-12: CPT

## 2025-05-02 PROCEDURE — 84439 ASSAY OF FREE THYROXINE: CPT

## 2025-05-02 PROCEDURE — 84443 ASSAY THYROID STIM HORMONE: CPT

## 2025-05-02 PROCEDURE — 85025 COMPLETE CBC W/AUTO DIFF WBC: CPT

## 2025-05-02 PROCEDURE — 80061 LIPID PANEL: CPT

## 2025-05-06 LAB — VIT B1 BLD-SCNC: 101.3 NMOL/L (ref 66.5–200)

## (undated) DEVICE — GAUZE SPONGES,16 PLY: Brand: CURITY

## (undated) DEVICE — SUT MONOCRYL 0 CTX 36 IN Y398H

## (undated) DEVICE — GLOVE SRG BIOGEL ECLIPSE 8

## (undated) DEVICE — PACK C-SECTION PBDS

## (undated) DEVICE — SKIN MARKER DUAL TIP WITH RULER CAP, FLEXIBLE RULER AND LABELS: Brand: DEVON

## (undated) DEVICE — CHLORAPREP HI-LITE 10.5ML ORANGE

## (undated) DEVICE — ADHESIVE SKIN HIGH VISCOSITY EXOFIN 1ML

## (undated) DEVICE — SUT VICRYL 2-0 CT-1 36 IN VR945

## (undated) DEVICE — SUT MONOCRYL 4-0 PS-2 27 IN Y426H

## (undated) DEVICE — SUT VICRYL 0 CT-1 27 IN J260H

## (undated) DEVICE — SUT VICRYL 0 CTX 36 IN J978H

## (undated) DEVICE — GLOVE PI ULTRA TOUCH SZ.7.0

## (undated) DEVICE — MEDI-VAC YANKAUER SUCTION HANDLE W/STRAIGHT TIP & CONTROL VENT: Brand: CARDINAL HEALTH

## (undated) DEVICE — TELFA NON-ADHERENT ABSORBENT DRESSING: Brand: TELFA

## (undated) DEVICE — Device

## (undated) DEVICE — ABDOMINAL PAD: Brand: DERMACEA

## (undated) DEVICE — SUT PLAIN 3-0 CT-1 27 IN 842H

## (undated) DEVICE — SUT VICRYL 4-0 KS 27 IN J662H

## (undated) DEVICE — CHLORAPREP HI-LITE 26ML ORANGE